# Patient Record
Sex: FEMALE | Race: ASIAN | NOT HISPANIC OR LATINO | URBAN - METROPOLITAN AREA
[De-identification: names, ages, dates, MRNs, and addresses within clinical notes are randomized per-mention and may not be internally consistent; named-entity substitution may affect disease eponyms.]

---

## 2014-02-26 RX ORDER — METOPROLOL TARTRATE 50 MG
1 TABLET ORAL
Qty: 0 | Refills: 0 | DISCHARGE
Start: 2014-02-26

## 2017-02-06 ENCOUNTER — INPATIENT (INPATIENT)
Facility: HOSPITAL | Age: 82
LOS: 1 days | Discharge: HOME | End: 2017-02-08
Attending: INTERNAL MEDICINE | Admitting: INTERNAL MEDICINE
Payer: MEDICARE

## 2017-02-06 VITALS
DIASTOLIC BLOOD PRESSURE: 58 MMHG | OXYGEN SATURATION: 100 % | TEMPERATURE: 98 F | SYSTOLIC BLOOD PRESSURE: 152 MMHG | RESPIRATION RATE: 16 BRPM | HEART RATE: 64 BPM

## 2017-02-06 DIAGNOSIS — Z95.5 PRESENCE OF CORONARY ANGIOPLASTY IMPLANT AND GRAFT: Chronic | ICD-10-CM

## 2017-02-06 DIAGNOSIS — I10 ESSENTIAL (PRIMARY) HYPERTENSION: ICD-10-CM

## 2017-02-06 DIAGNOSIS — R07.9 CHEST PAIN, UNSPECIFIED: ICD-10-CM

## 2017-02-06 DIAGNOSIS — J45.909 UNSPECIFIED ASTHMA, UNCOMPLICATED: ICD-10-CM

## 2017-02-06 DIAGNOSIS — K21.9 GASTRO-ESOPHAGEAL REFLUX DISEASE WITHOUT ESOPHAGITIS: ICD-10-CM

## 2017-02-06 DIAGNOSIS — Z98.49 CATARACT EXTRACTION STATUS, UNSPECIFIED EYE: Chronic | ICD-10-CM

## 2017-02-06 DIAGNOSIS — E03.9 HYPOTHYROIDISM, UNSPECIFIED: ICD-10-CM

## 2017-02-06 DIAGNOSIS — Z41.8 ENCOUNTER FOR OTHER PROCEDURES FOR PURPOSES OTHER THAN REMEDYING HEALTH STATE: ICD-10-CM

## 2017-02-06 DIAGNOSIS — E11.9 TYPE 2 DIABETES MELLITUS WITHOUT COMPLICATIONS: ICD-10-CM

## 2017-02-06 LAB
ALBUMIN SERPL ELPH-MCNC: 4.4 G/DL — SIGNIFICANT CHANGE UP (ref 3.3–5)
ALP SERPL-CCNC: 76 U/L — SIGNIFICANT CHANGE UP (ref 40–120)
ALT FLD-CCNC: 17 U/L — SIGNIFICANT CHANGE UP (ref 4–33)
APTT BLD: 30.5 SEC — SIGNIFICANT CHANGE UP (ref 27.5–37.4)
AST SERPL-CCNC: 20 U/L — SIGNIFICANT CHANGE UP (ref 4–32)
B PERT DNA SPEC QL NAA+PROBE: SIGNIFICANT CHANGE UP
BASE EXCESS BLDV CALC-SCNC: 5.3 MMOL/L — SIGNIFICANT CHANGE UP
BASOPHILS # BLD AUTO: 0.03 K/UL — SIGNIFICANT CHANGE UP (ref 0–0.2)
BASOPHILS NFR BLD AUTO: 0.3 % — SIGNIFICANT CHANGE UP (ref 0–2)
BILIRUB SERPL-MCNC: 0.4 MG/DL — SIGNIFICANT CHANGE UP (ref 0.2–1.2)
BLOOD GAS VENOUS - CREATININE: 0.88 MG/DL — SIGNIFICANT CHANGE UP (ref 0.5–1.3)
BUN SERPL-MCNC: 26 MG/DL — HIGH (ref 7–23)
C PNEUM DNA SPEC QL NAA+PROBE: NOT DETECTED — SIGNIFICANT CHANGE UP
CALCIUM SERPL-MCNC: 9.5 MG/DL — SIGNIFICANT CHANGE UP (ref 8.4–10.5)
CHLORIDE BLDV-SCNC: 101 MMOL/L — SIGNIFICANT CHANGE UP (ref 96–108)
CHLORIDE SERPL-SCNC: 99 MMOL/L — SIGNIFICANT CHANGE UP (ref 98–107)
CO2 SERPL-SCNC: 29 MMOL/L — SIGNIFICANT CHANGE UP (ref 22–31)
CREAT SERPL-MCNC: 0.9 MG/DL — SIGNIFICANT CHANGE UP (ref 0.5–1.3)
EOSINOPHIL # BLD AUTO: 0.46 K/UL — SIGNIFICANT CHANGE UP (ref 0–0.5)
EOSINOPHIL NFR BLD AUTO: 4.4 % — SIGNIFICANT CHANGE UP (ref 0–6)
FLUAV H1 2009 PAND RNA SPEC QL NAA+PROBE: NOT DETECTED — SIGNIFICANT CHANGE UP
FLUAV H1 RNA SPEC QL NAA+PROBE: NOT DETECTED — SIGNIFICANT CHANGE UP
FLUAV H3 RNA SPEC QL NAA+PROBE: NOT DETECTED — SIGNIFICANT CHANGE UP
FLUAV SUBTYP SPEC NAA+PROBE: SIGNIFICANT CHANGE UP
FLUBV RNA SPEC QL NAA+PROBE: NOT DETECTED — SIGNIFICANT CHANGE UP
GAS PNL BLDV: 138 MMOL/L — SIGNIFICANT CHANGE UP (ref 136–146)
GLUCOSE BLDV-MCNC: 90 — SIGNIFICANT CHANGE UP (ref 70–99)
GLUCOSE SERPL-MCNC: 98 MG/DL — SIGNIFICANT CHANGE UP (ref 70–99)
HADV DNA SPEC QL NAA+PROBE: NOT DETECTED — SIGNIFICANT CHANGE UP
HCO3 BLDV-SCNC: 25 MMOL/L — SIGNIFICANT CHANGE UP (ref 20–27)
HCOV 229E RNA SPEC QL NAA+PROBE: NOT DETECTED — SIGNIFICANT CHANGE UP
HCOV HKU1 RNA SPEC QL NAA+PROBE: NOT DETECTED — SIGNIFICANT CHANGE UP
HCOV NL63 RNA SPEC QL NAA+PROBE: NOT DETECTED — SIGNIFICANT CHANGE UP
HCOV OC43 RNA SPEC QL NAA+PROBE: NOT DETECTED — SIGNIFICANT CHANGE UP
HCT VFR BLD CALC: 40.8 % — SIGNIFICANT CHANGE UP (ref 34.5–45)
HCT VFR BLDV CALC: 40.6 % — SIGNIFICANT CHANGE UP (ref 34.5–45)
HGB BLD-MCNC: 12.9 G/DL — SIGNIFICANT CHANGE UP (ref 11.5–15.5)
HGB BLDV-MCNC: 13.2 G/DL — SIGNIFICANT CHANGE UP (ref 11.5–15.5)
HMPV RNA SPEC QL NAA+PROBE: NOT DETECTED — SIGNIFICANT CHANGE UP
HPIV1 RNA SPEC QL NAA+PROBE: NOT DETECTED — SIGNIFICANT CHANGE UP
HPIV2 RNA SPEC QL NAA+PROBE: NOT DETECTED — SIGNIFICANT CHANGE UP
HPIV3 RNA SPEC QL NAA+PROBE: NOT DETECTED — SIGNIFICANT CHANGE UP
HPIV4 RNA SPEC QL NAA+PROBE: NOT DETECTED — SIGNIFICANT CHANGE UP
IMM GRANULOCYTES NFR BLD AUTO: 0.2 % — SIGNIFICANT CHANGE UP (ref 0–1.5)
INR BLD: 1.02 — SIGNIFICANT CHANGE UP (ref 0.87–1.18)
LACTATE BLDV-MCNC: 1 MMOL/L — SIGNIFICANT CHANGE UP (ref 0.5–2)
LIDOCAIN IGE QN: 35.1 U/L — SIGNIFICANT CHANGE UP (ref 7–60)
LYMPHOCYTES # BLD AUTO: 2.63 K/UL — SIGNIFICANT CHANGE UP (ref 1–3.3)
LYMPHOCYTES # BLD AUTO: 25.4 % — SIGNIFICANT CHANGE UP (ref 13–44)
M PNEUMO DNA SPEC QL NAA+PROBE: NOT DETECTED — SIGNIFICANT CHANGE UP
MCHC RBC-ENTMCNC: 27.3 PG — SIGNIFICANT CHANGE UP (ref 27–34)
MCHC RBC-ENTMCNC: 31.6 % — LOW (ref 32–36)
MCV RBC AUTO: 86.3 FL — SIGNIFICANT CHANGE UP (ref 80–100)
MONOCYTES # BLD AUTO: 0.91 K/UL — HIGH (ref 0–0.9)
MONOCYTES NFR BLD AUTO: 8.8 % — SIGNIFICANT CHANGE UP (ref 2–14)
NEUTROPHILS # BLD AUTO: 6.32 K/UL — SIGNIFICANT CHANGE UP (ref 1.8–7.4)
NEUTROPHILS NFR BLD AUTO: 60.9 % — SIGNIFICANT CHANGE UP (ref 43–77)
NT-PROBNP SERPL-SCNC: 92.73 PG/ML — SIGNIFICANT CHANGE UP
PCO2 BLDV: 74 MMHG — HIGH (ref 41–51)
PH BLDV: 7.26 PH — LOW (ref 7.32–7.43)
PLATELET # BLD AUTO: 242 K/UL — SIGNIFICANT CHANGE UP (ref 150–400)
PMV BLD: 11.3 FL — SIGNIFICANT CHANGE UP (ref 7–13)
PO2 BLDV: < 24 MMHG — LOW (ref 35–40)
POTASSIUM BLDV-SCNC: 4.6 MMOL/L — HIGH (ref 3.4–4.5)
POTASSIUM SERPL-MCNC: 5.4 MMOL/L — HIGH (ref 3.5–5.3)
POTASSIUM SERPL-SCNC: 5.4 MMOL/L — HIGH (ref 3.5–5.3)
PROT SERPL-MCNC: 7.6 G/DL — SIGNIFICANT CHANGE UP (ref 6–8.3)
PROTHROM AB SERPL-ACNC: 11.6 SEC — SIGNIFICANT CHANGE UP (ref 10–13.1)
RBC # BLD: 4.73 M/UL — SIGNIFICANT CHANGE UP (ref 3.8–5.2)
RBC # FLD: 15.4 % — HIGH (ref 10.3–14.5)
RSV RNA SPEC QL NAA+PROBE: NOT DETECTED — SIGNIFICANT CHANGE UP
RV+EV RNA SPEC QL NAA+PROBE: NOT DETECTED — SIGNIFICANT CHANGE UP
SAO2 % BLDV: 24.8 % — LOW (ref 60–85)
SODIUM SERPL-SCNC: 141 MMOL/L — SIGNIFICANT CHANGE UP (ref 135–145)
TROPONIN T SERPL-MCNC: < 0.06 NG/ML — SIGNIFICANT CHANGE UP (ref 0–0.06)
TSH SERPL-MCNC: 0.43 UIU/ML — SIGNIFICANT CHANGE UP (ref 0.27–4.2)
WBC # BLD: 10.37 K/UL — SIGNIFICANT CHANGE UP (ref 3.8–10.5)
WBC # FLD AUTO: 10.37 K/UL — SIGNIFICANT CHANGE UP (ref 3.8–10.5)

## 2017-02-06 PROCEDURE — 71010: CPT | Mod: 26

## 2017-02-06 RX ORDER — INSULIN LISPRO 100/ML
VIAL (ML) SUBCUTANEOUS
Qty: 0 | Refills: 0 | Status: DISCONTINUED | OUTPATIENT
Start: 2017-02-06 | End: 2017-02-08

## 2017-02-06 RX ORDER — DEXTROSE 50 % IN WATER 50 %
25 SYRINGE (ML) INTRAVENOUS ONCE
Qty: 0 | Refills: 0 | Status: DISCONTINUED | OUTPATIENT
Start: 2017-02-06 | End: 2017-02-08

## 2017-02-06 RX ORDER — INFLUENZA VIRUS VACCINE 15; 15; 15; 15 UG/.5ML; UG/.5ML; UG/.5ML; UG/.5ML
0.5 SUSPENSION INTRAMUSCULAR ONCE
Qty: 0 | Refills: 0 | Status: COMPLETED | OUTPATIENT
Start: 2017-02-06 | End: 2017-02-08

## 2017-02-06 RX ORDER — SODIUM CHLORIDE 9 MG/ML
500 INJECTION INTRAMUSCULAR; INTRAVENOUS; SUBCUTANEOUS ONCE
Qty: 0 | Refills: 0 | Status: COMPLETED | OUTPATIENT
Start: 2017-02-06 | End: 2017-02-06

## 2017-02-06 RX ORDER — SODIUM CHLORIDE 9 MG/ML
1000 INJECTION, SOLUTION INTRAVENOUS
Qty: 0 | Refills: 0 | Status: DISCONTINUED | OUTPATIENT
Start: 2017-02-06 | End: 2017-02-08

## 2017-02-06 RX ORDER — DEXTROSE 50 % IN WATER 50 %
12.5 SYRINGE (ML) INTRAVENOUS ONCE
Qty: 0 | Refills: 0 | Status: DISCONTINUED | OUTPATIENT
Start: 2017-02-06 | End: 2017-02-08

## 2017-02-06 RX ORDER — ALBUTEROL 90 UG/1
1 AEROSOL, METERED ORAL
Qty: 0 | Refills: 0 | COMMUNITY

## 2017-02-06 RX ORDER — PANTOPRAZOLE SODIUM 20 MG/1
40 TABLET, DELAYED RELEASE ORAL
Qty: 0 | Refills: 0 | Status: DISCONTINUED | OUTPATIENT
Start: 2017-02-06 | End: 2017-02-08

## 2017-02-06 RX ORDER — METOPROLOL TARTRATE 50 MG
50 TABLET ORAL
Qty: 0 | Refills: 0 | Status: DISCONTINUED | OUTPATIENT
Start: 2017-02-06 | End: 2017-02-08

## 2017-02-06 RX ORDER — TICAGRELOR 90 MG/1
60 TABLET ORAL
Qty: 0 | Refills: 0 | Status: DISCONTINUED | OUTPATIENT
Start: 2017-02-06 | End: 2017-02-08

## 2017-02-06 RX ORDER — MORPHINE SULFATE 50 MG/1
1 CAPSULE, EXTENDED RELEASE ORAL ONCE
Qty: 0 | Refills: 0 | Status: DISCONTINUED | OUTPATIENT
Start: 2017-02-06 | End: 2017-02-06

## 2017-02-06 RX ORDER — SODIUM POLYSTYRENE SULFONATE 4.1 MEQ/G
30 POWDER, FOR SUSPENSION ORAL ONCE
Qty: 0 | Refills: 0 | Status: COMPLETED | OUTPATIENT
Start: 2017-02-06 | End: 2017-02-06

## 2017-02-06 RX ORDER — DEXTROSE 50 % IN WATER 50 %
1 SYRINGE (ML) INTRAVENOUS ONCE
Qty: 0 | Refills: 0 | Status: DISCONTINUED | OUTPATIENT
Start: 2017-02-06 | End: 2017-02-08

## 2017-02-06 RX ORDER — CALCIUM GLUCONATE 100 MG/ML
1 VIAL (ML) INTRAVENOUS ONCE
Qty: 0 | Refills: 0 | Status: COMPLETED | OUTPATIENT
Start: 2017-02-06 | End: 2017-02-06

## 2017-02-06 RX ORDER — LEVOTHYROXINE SODIUM 125 MCG
100 TABLET ORAL DAILY
Qty: 0 | Refills: 0 | Status: DISCONTINUED | OUTPATIENT
Start: 2017-02-06 | End: 2017-02-08

## 2017-02-06 RX ORDER — HEPARIN SODIUM 5000 [USP'U]/ML
5000 INJECTION INTRAVENOUS; SUBCUTANEOUS EVERY 12 HOURS
Qty: 0 | Refills: 0 | Status: DISCONTINUED | OUTPATIENT
Start: 2017-02-06 | End: 2017-02-08

## 2017-02-06 RX ORDER — INSULIN LISPRO 100/ML
VIAL (ML) SUBCUTANEOUS AT BEDTIME
Qty: 0 | Refills: 0 | Status: DISCONTINUED | OUTPATIENT
Start: 2017-02-06 | End: 2017-02-08

## 2017-02-06 RX ORDER — GLUCAGON INJECTION, SOLUTION 0.5 MG/.1ML
1 INJECTION, SOLUTION SUBCUTANEOUS ONCE
Qty: 0 | Refills: 0 | Status: DISCONTINUED | OUTPATIENT
Start: 2017-02-06 | End: 2017-02-08

## 2017-02-06 RX ORDER — ASPIRIN/CALCIUM CARB/MAGNESIUM 324 MG
81 TABLET ORAL DAILY
Qty: 0 | Refills: 0 | Status: DISCONTINUED | OUTPATIENT
Start: 2017-02-06 | End: 2017-02-08

## 2017-02-06 RX ORDER — ATORVASTATIN CALCIUM 80 MG/1
40 TABLET, FILM COATED ORAL AT BEDTIME
Qty: 0 | Refills: 0 | Status: DISCONTINUED | OUTPATIENT
Start: 2017-02-06 | End: 2017-02-08

## 2017-02-06 RX ADMIN — MORPHINE SULFATE 1 MILLIGRAM(S): 50 CAPSULE, EXTENDED RELEASE ORAL at 17:57

## 2017-02-06 RX ADMIN — SODIUM CHLORIDE 500 MILLILITER(S): 9 INJECTION INTRAMUSCULAR; INTRAVENOUS; SUBCUTANEOUS at 17:57

## 2017-02-06 RX ADMIN — ATORVASTATIN CALCIUM 40 MILLIGRAM(S): 80 TABLET, FILM COATED ORAL at 23:01

## 2017-02-06 RX ADMIN — Medication 200 GRAM(S): at 20:47

## 2017-02-06 RX ADMIN — SODIUM POLYSTYRENE SULFONATE 30 GRAM(S): 4.1 POWDER, FOR SUSPENSION ORAL at 20:47

## 2017-02-06 NOTE — ED ADULT NURSE NOTE - PMH
Asthma    CAD (coronary artery disease)    CAD (Coronary Atherosclerotic Disease)  2 yrs ago  Coronary Stent  Viktor López 2010  DM (diabetes mellitus)    Esophageal tear  s/p clipping  GERD (Gastroesophageal Reflux Disease)    High Cholesterol    HTN (hypertension)    HTN (Hypertension)    Hypothyroidism    MI (myocardial infarction)    Systolic heart failure    Ventricular fibrillation  on Life Vest

## 2017-02-06 NOTE — ED PROVIDER NOTE - OBJECTIVE STATEMENT
83 yo F/ w/ HTN, DM, GERD, Asthma, CAD s/p stent x 3 c/o palpitations, chest pain, nausea but no vomiting, on and off for more than 1 week. Chest pain is substernal, comes and goes, substernal, lasts 2-3mns, feel like a pressure, exertional associated with SOB on exertion. Pt also reports intermittent leg swelling. Pt denies chest pain at the moment. Denies fever, chills, URI symptoms, or any other symptoms. 83 yo F/ w/ HTN, DM, GERD, Asthma, CAD s/p stent x 3, cardiac arrest c/o palpitations, chest pain, nausea but no vomiting, on and off for 3 weeks. Chest pain is substernal, comes and goes,  lasts 2-3mns, feel like a pressure, exertional associated with SOB on exertion. has not seen a doctor aout this yet, was avoiding coming to the hospital. Pt also reports intermittent leg swelling. Pt denies chest pain at the moment. Denies fever, chills, URI symptoms, or any other symptoms. having nromal BM's and is passing gas.     Cards = Dr. Manpreet Dotson  PMD Dr. WU Ziegler

## 2017-02-06 NOTE — ED PROVIDER NOTE - ATTENDING CONTRIBUTION TO CARE
Attending Attestation: Dr. Duggan  I have personally performed a history and physical examination of the patient and discussed management with the resident as well as the patient.  I reviewed the resident's note and agree with the documented findings and plan of care.  I have authored and modified critical sections of the Provider Note, including but not limited to HPI, Physical Exam and MDM. 83 yo F c complex PMhx p/w intermittent chest pain x 3 weeks concfrning for UA.  Abd soft and minimally tender, normal Bnm and no GI sx.  Would not image at present, but perform serial abd exams, labs, admit to TEle.

## 2017-02-06 NOTE — ED PROVIDER NOTE - MEDICAL DECISION MAKING DETAILS
83 yo F/ w/ HTN, DM, GERD, Asthma, CAD s/p stent x 3 c/o palpitations, chest pain, SOB, nausea but no vomiting, on and off for more than 1 week.  - Labs, pro-BNP 83 yo F c complex PMhx p/w intermittent chest pain x 3 weeks concfrning for UA.  Abd soft and minimally tender, normal Bnm and no GI sx.  Would not image at present, but perform serial abd exams, labs, admit to TEle.

## 2017-02-06 NOTE — ED ADULT NURSE NOTE - ED STAT RN HANDOFF DETAILS
Rpt to 7N RN - pt resting comfortably, HR NSR on CM - just finished receiving KayExelate/CalGlu now for elevated K+, Needs repeat CE/K+ drawn in 30mins.  Pt VSS, EKG in Chart, Urdo speaking, Pt family called and alerted to bed assign , in NAD - stable for transport.

## 2017-02-06 NOTE — H&P ADULT. - ASSESSMENT
81 y/o Slovenian speaking F with PMH of CAD(s/p 2 stents), GERD, DM, Asthma, Hypothyroidism presented with the complaint of palpitations and left sided chest pain. R/o ACS    +Chest pain-Dr. Manpreet Dotson following

## 2017-02-06 NOTE — H&P ADULT. - RS GEN PE MLT RESP DETAILS PC
normal/rhonchi/diminished breath sounds, L/airway patent/no intercostal retractions/respirations non-labored/no chest wall tenderness/diminished breath sounds, R/no rales/no wheezes

## 2017-02-06 NOTE — H&P ADULT. - NEGATIVE MUSCULOSKELETAL SYMPTOMS
no joint swelling/no neck pain/no myalgia/no muscle weakness/no stiffness/no arthritis/no muscle cramps/no arthralgia

## 2017-02-06 NOTE — H&P ADULT. - NEGATIVE OPHTHALMOLOGIC SYMPTOMS
no diplopia/no lacrimation L/no lacrimation R/no photophobia/no blurred vision L/no discharge L/no blurred vision R

## 2017-02-06 NOTE — H&P ADULT. - PROBLEM SELECTOR PLAN 1
Will admit to telemetry, serial EKG, serial Fran prn chest pain/SOB  f/u MD note   HgBA1C, TSH, lipid profile, CBC, CMP in am   TTE ordered to evaluate LVEF  Nuclear Pharmacologic stress test ordered   Dr. Manpreet Dotson following for cardiology and recommended "Echo and NST(Pharm), Cont with home meds, Check TSH"  Continue with Aspirin 81mg and Brilinta 60mg BID

## 2017-02-06 NOTE — H&P ADULT. - NEGATIVE NEUROLOGICAL SYMPTOMS
no syncope/no focal seizures/no tremors/no loss of consciousness/no vertigo/no confusion/no loss of sensation/no generalized seizures/no transient paralysis/no hemiparesis/no paresthesias/no difficulty walking/no headache/no weakness

## 2017-02-06 NOTE — H&P ADULT. - NEGATIVE ENMT SYMPTOMS
no vertigo/no nasal congestion/no tinnitus/no sinus symptoms/no ear pain/no nasal discharge/no hearing difficulty

## 2017-02-06 NOTE — H&P ADULT. - RADIOLOGY RESULTS AND INTERPRETATION
CXR: Redemonstrated lobular masslike soft tissue shadow protruding from left hemidiaphragm corresponding to focal diaphragmatic herniation of fat as demonstrated on chest CT from 2/9/2015. Clear remaining visualized lungs. No pleural effusions or pneumothorax. Stable cardiac and mediastinal silhouettes including a left coronary stent. Trachea midline. Generalized osteopenia again noted.

## 2017-02-06 NOTE — ED ADULT NURSE NOTE - OBJECTIVE STATEMENT
pt to spot # 21 leeann speaking with grandughter at bedside for translation, pt with complaints of SOB, chest pain and nausea, on and off x 1 week, and palpitations with left flank pain x 1 hour PMH anxiety, cardiac arrest, cardiac stent x 3, HTN, labs drawn and sent, vitals as noted, MD collins at bedside, will monitor and follow up

## 2017-02-06 NOTE — ED ADULT NURSE NOTE - PSH
History of Cholecystectomy    S/P cataract extraction    Stented coronary artery    Stented coronary artery  x3, last one stent put in on 2/30/2014

## 2017-02-06 NOTE — ED PROVIDER NOTE - PROGRESS NOTE DETAILS
Spoke with dr. LAURY Dotson who asked to admit to Dr. Ziegler and stated he would inform Dr. Ziegler, states we do not need ot call Toney.

## 2017-02-06 NOTE — ED ADULT TRIAGE NOTE - CHIEF COMPLAINT QUOTE
c/o SOB, chest pain and nausea, on and off x 1 week, and palpitations with left flank pain x 1 hour PMH anxiety, cardiac arrest, cardiac stent x 3, HTN,

## 2017-02-07 ENCOUNTER — TRANSCRIPTION ENCOUNTER (OUTPATIENT)
Age: 82
End: 2017-02-07

## 2017-02-07 LAB
ALBUMIN SERPL ELPH-MCNC: 3.8 G/DL — SIGNIFICANT CHANGE UP (ref 3.3–5)
ALP SERPL-CCNC: 67 U/L — SIGNIFICANT CHANGE UP (ref 40–120)
ALT FLD-CCNC: 14 U/L — SIGNIFICANT CHANGE UP (ref 4–33)
AST SERPL-CCNC: 16 U/L — SIGNIFICANT CHANGE UP (ref 4–32)
BILIRUB SERPL-MCNC: 0.3 MG/DL — SIGNIFICANT CHANGE UP (ref 0.2–1.2)
BUN SERPL-MCNC: 20 MG/DL — SIGNIFICANT CHANGE UP (ref 7–23)
CALCIUM SERPL-MCNC: 8.8 MG/DL — SIGNIFICANT CHANGE UP (ref 8.4–10.5)
CHLORIDE SERPL-SCNC: 104 MMOL/L — SIGNIFICANT CHANGE UP (ref 98–107)
CHOLEST SERPL-MCNC: 103 MG/DL — LOW (ref 120–199)
CO2 SERPL-SCNC: 26 MMOL/L — SIGNIFICANT CHANGE UP (ref 22–31)
CREAT SERPL-MCNC: 0.76 MG/DL — SIGNIFICANT CHANGE UP (ref 0.5–1.3)
GLUCOSE SERPL-MCNC: 95 MG/DL — SIGNIFICANT CHANGE UP (ref 70–99)
HBA1C BLD-MCNC: 6 % — HIGH (ref 4–5.6)
HCT VFR BLD CALC: 36.8 % — SIGNIFICANT CHANGE UP (ref 34.5–45)
HDLC SERPL-MCNC: 45 MG/DL — SIGNIFICANT CHANGE UP (ref 45–65)
HGB BLD-MCNC: 11.8 G/DL — SIGNIFICANT CHANGE UP (ref 11.5–15.5)
LIPID PNL WITH DIRECT LDL SERPL: 57 MG/DL — SIGNIFICANT CHANGE UP
MAGNESIUM SERPL-MCNC: 2 MG/DL — SIGNIFICANT CHANGE UP (ref 1.6–2.6)
MCHC RBC-ENTMCNC: 27.6 PG — SIGNIFICANT CHANGE UP (ref 27–34)
MCHC RBC-ENTMCNC: 32.1 % — SIGNIFICANT CHANGE UP (ref 32–36)
MCV RBC AUTO: 86 FL — SIGNIFICANT CHANGE UP (ref 80–100)
PHOSPHATE SERPL-MCNC: 4.5 MG/DL — SIGNIFICANT CHANGE UP (ref 2.5–4.5)
PLATELET # BLD AUTO: 225 K/UL — SIGNIFICANT CHANGE UP (ref 150–400)
PMV BLD: 11.6 FL — SIGNIFICANT CHANGE UP (ref 7–13)
POTASSIUM SERPL-MCNC: 4.4 MMOL/L — SIGNIFICANT CHANGE UP (ref 3.5–5.3)
POTASSIUM SERPL-MCNC: 4.5 MMOL/L — SIGNIFICANT CHANGE UP (ref 3.5–5.3)
POTASSIUM SERPL-SCNC: 4.4 MMOL/L — SIGNIFICANT CHANGE UP (ref 3.5–5.3)
POTASSIUM SERPL-SCNC: 4.5 MMOL/L — SIGNIFICANT CHANGE UP (ref 3.5–5.3)
PROT SERPL-MCNC: 6.4 G/DL — SIGNIFICANT CHANGE UP (ref 6–8.3)
RBC # BLD: 4.28 M/UL — SIGNIFICANT CHANGE UP (ref 3.8–5.2)
RBC # FLD: 15.6 % — HIGH (ref 10.3–14.5)
SODIUM SERPL-SCNC: 143 MMOL/L — SIGNIFICANT CHANGE UP (ref 135–145)
TRIGL SERPL-MCNC: 59 MG/DL — SIGNIFICANT CHANGE UP (ref 10–149)
TROPONIN T SERPL-MCNC: < 0.06 NG/ML — SIGNIFICANT CHANGE UP (ref 0–0.06)
TSH SERPL-MCNC: 0.53 UIU/ML — SIGNIFICANT CHANGE UP (ref 0.27–4.2)
WBC # BLD: 9.83 K/UL — SIGNIFICANT CHANGE UP (ref 3.8–10.5)
WBC # FLD AUTO: 9.83 K/UL — SIGNIFICANT CHANGE UP (ref 3.8–10.5)

## 2017-02-07 PROCEDURE — 93306 TTE W/DOPPLER COMPLETE: CPT | Mod: 26

## 2017-02-07 PROCEDURE — 78452 HT MUSCLE IMAGE SPECT MULT: CPT | Mod: 26

## 2017-02-07 PROCEDURE — 93018 CV STRESS TEST I&R ONLY: CPT | Mod: GC

## 2017-02-07 PROCEDURE — 93016 CV STRESS TEST SUPVJ ONLY: CPT | Mod: GC

## 2017-02-07 RX ORDER — ALBUTEROL 90 UG/1
0 AEROSOL, METERED ORAL
Qty: 0 | Refills: 0 | COMMUNITY

## 2017-02-07 RX ORDER — ALBUTEROL 90 UG/1
1.25 AEROSOL, METERED ORAL
Qty: 0 | Refills: 0 | Status: DISCONTINUED | OUTPATIENT
Start: 2017-02-07 | End: 2017-02-07

## 2017-02-07 RX ORDER — NITROGLYCERIN 6.5 MG
0 CAPSULE, EXTENDED RELEASE ORAL
Qty: 0 | Refills: 0 | COMMUNITY

## 2017-02-07 RX ORDER — LISINOPRIL 2.5 MG/1
1 TABLET ORAL
Qty: 0 | Refills: 0 | COMMUNITY

## 2017-02-07 RX ORDER — IPRATROPIUM/ALBUTEROL SULFATE 18-103MCG
3 AEROSOL WITH ADAPTER (GRAM) INHALATION ONCE
Qty: 0 | Refills: 0 | Status: DISCONTINUED | OUTPATIENT
Start: 2017-02-07 | End: 2017-02-08

## 2017-02-07 RX ADMIN — Medication 50 MILLIGRAM(S): at 05:42

## 2017-02-07 RX ADMIN — TICAGRELOR 60 MILLIGRAM(S): 90 TABLET ORAL at 17:39

## 2017-02-07 RX ADMIN — Medication 50 MILLIGRAM(S): at 17:39

## 2017-02-07 RX ADMIN — Medication 81 MILLIGRAM(S): at 13:34

## 2017-02-07 RX ADMIN — TICAGRELOR 60 MILLIGRAM(S): 90 TABLET ORAL at 07:00

## 2017-02-07 RX ADMIN — HEPARIN SODIUM 5000 UNIT(S): 5000 INJECTION INTRAVENOUS; SUBCUTANEOUS at 05:42

## 2017-02-07 RX ADMIN — ATORVASTATIN CALCIUM 40 MILLIGRAM(S): 80 TABLET, FILM COATED ORAL at 22:04

## 2017-02-07 RX ADMIN — Medication 100 MICROGRAM(S): at 05:42

## 2017-02-07 RX ADMIN — PANTOPRAZOLE SODIUM 40 MILLIGRAM(S): 20 TABLET, DELAYED RELEASE ORAL at 07:01

## 2017-02-07 RX ADMIN — HEPARIN SODIUM 5000 UNIT(S): 5000 INJECTION INTRAVENOUS; SUBCUTANEOUS at 17:39

## 2017-02-07 NOTE — DISCHARGE NOTE ADULT - NS AS DC HF EDUCATION INSTRUCTIONS
Report weight gain of 2 or more pounds in one day or 3 or more pounds in one week, worsening shortness of breath, fatigue, weakness, increased swelling of hands and feet to primary care provider/Activities as tolerated/Call Primary Care Provider for follow-up after discharge/Low salt diet/Monitor Weight Daily

## 2017-02-07 NOTE — DISCHARGE NOTE ADULT - ADDITIONAL INSTRUCTIONS
Please follow up with your primary care provider within 2 weeks call for an appointment Please follow up with your primary care provider and cardiologist within 2 weeks -call for appointments

## 2017-02-07 NOTE — DISCHARGE NOTE ADULT - SECONDARY DIAGNOSIS.
Asthma DM (diabetes mellitus) GERD (Gastroesophageal Reflux Disease) HTN (hypertension) High cholesterol

## 2017-02-07 NOTE — DISCHARGE NOTE ADULT - CARE PROVIDER_API CALL
Call your PMD for an appointment  within 2 weeks,   Phone: (   )    -  Fax: (   )    - Teresa Ziegler (ISA[facility]), Internal Medicine  [address] [place] [address]  Phone: [phone]  Fax: [phone]    Manpreet Dotson), Cardiovascular Disease; Internal Medicine  [address] [place] [address]  Phone: [phone]  Fax: [phone]

## 2017-02-07 NOTE — DISCHARGE NOTE ADULT - PROVIDER TOKENS
FREE:[LAST:[Call your PMD for an appointment  within 2 weeks],PHONE:[(   )    -],FAX:[(   )    -]] TOKEN:'[id number]',TOKEN:'[id number]'

## 2017-02-07 NOTE — DISCHARGE NOTE ADULT - PATIENT PORTAL LINK FT
“You can access the Follow[facility] Patient Portal, offered by [facility], by registering with the following website: [email or web address]”

## 2017-02-07 NOTE — DISCHARGE NOTE ADULT - HOSPITAL COURSE
83 yo Indonesian speaking female with chest pain. Patient had an EKG . Cardiac enzymes negative x 2 . CXR no pleural effussions or pneumothorax. Patient had an ECHO and stress test.    ECHO     Stress test 81 yo Frisian speaking female with chest pain. Patient had an EKG . Cardiac enzymes negative x 2 . CXR no pleural effussions or pneumothorax. Patient had an ECHO and stress test.  Echo done on 2/7 showed  Mild aortic regurgitation.  Increased relative wall thickness with normal left ventricular mass index, consistent with concentric left ventricular remodeling.  Normal left ventricular systolic function. No segmental wall motion abnormalities. Normal right ventricular size and function.    Nuclear stress test done on 2/7 showed Myocardial Perfusion SPECT results are mildly abnormal.  There is a small, mild defect in apical wall that is fixed, suggestive of infarct. No clear evidence of ischemia. Post-stress gated wall motion analysis was performed (LVEF > 70%;LVEDV = 47 ml.), revealing normal LV function.  No segmental wall motion abnormality was seen. RV function appeared normal.    Patient remained stable and is now clear for discharge home. 83 yo Yoruba speaking female with chest pain. Patient had an EKG . Cardiac enzymes negative x 2 . CXR no pleural effussions or pneumothorax. Patient had an ECHO and stress test.  Echo done on 2/7 showed  Mild aortic regurgitation.  Increased relative wall thickness with normal left ventricular mass index, consistent with concentric left ventricular remodeling.  Normal left ventricular systolic function. No segmental wall motion abnormalities. Normal right ventricular size and function.    Nuclear stress test done on 2/7 showed Myocardial Perfusion SPECT results are mildly abnormal.  There is a small, mild defect in apical wall that is fixed, suggestive of infarct. No clear evidence of ischemia. Post-stress gated wall motion analysis was performed (LVEF > 70%;LVEDV = 47 ml.), revealing normal LV function.  No segmental wall motion abnormality was seen. RV function appeared normal.    Chest pain atypical.  Believed to be 2/2 GERD/gastritis.  Patient on protonix.     Patient remained stable and is now clear for discharge home.  She is to follow up with Dr. Ziegler and Dr. Dotson as outpatient within 1-2 weeks.

## 2017-02-07 NOTE — DISCHARGE NOTE ADULT - COMMUNITY RESOURCES
Home Health Aide reinstated for start of care 2/9/2017 for resumption of services as previously scheduled. If you would like to increase hours for aide please call [phone] to file an appeal of the December evaluation that cut your hours.

## 2017-02-07 NOTE — DISCHARGE NOTE ADULT - CARE PLAN
Principal Discharge DX:	Chest pain, unspecified type  Goal:	continue current care  Instructions for follow-up, activity and diet:	Please follow up with your primary care provider within 2 weeks call for an appointment Principal Discharge DX:	Chest pain, unspecified type  Goal:	Resolution of symptoms  Instructions for follow-up, activity and diet:	Please follow up with your primary care provider within 2 weeks call for an appointment  Secondary Diagnosis:	Asthma  Instructions for follow-up, activity and diet:	Use albuterol inhaler as needed for shortness of breath or wheezing.  Follow up with your primary care provider.  Secondary Diagnosis:	DM (diabetes mellitus)  Instructions for follow-up, activity and diet:	Continue diabetes medications as prescribed.  Monitor your fingersticks.  Low sugar diet.  Follow up with your primary care provider.  Secondary Diagnosis:	GERD (Gastroesophageal Reflux Disease)  Instructions for follow-up, activity and diet:	Continue protonix as prescribed.  Follow up with your primary care provider.  Secondary Diagnosis:	HTN (hypertension)  Instructions for follow-up, activity and diet:	Continue medications as prescribed.  Low salt diet.  Follow up with your cardiologist.  Secondary Diagnosis:	High cholesterol  Instructions for follow-up, activity and diet:	Continue atorvastatin as prescribed.  Low fat diet.  Follow up with your primary care provider.

## 2017-02-07 NOTE — DISCHARGE NOTE ADULT - MEDICATION SUMMARY - MEDICATIONS TO STOP TAKING
I will STOP taking the medications listed below when I get home from the hospital:  None I will STOP taking the medications listed below when I get home from the hospital:    Nitrostat  --  under tongue , As Needed

## 2017-02-07 NOTE — DISCHARGE NOTE ADULT - MEDICATION SUMMARY - MEDICATIONS TO TAKE
I will START or STAY ON the medications listed below when I get home from the hospital:    aspirin 81 mg oral tablet  -- 1 tab(s) by mouth once a day  -- Indication: For Heart    Tradjenta 5 mg oral tablet  -- 1 tab(s) by mouth once a day  -- Indication: For Diabetes    atorvastatin 40 mg oral tablet  -- 1 tab(s) by mouth once a day  -- Indication: For Cholesterol    Brilinta (ticagrelor) 60 mg oral tablet  -- 1 tab(s) by mouth 2 times a day  -- Indication: For Heart    metoprolol tartrate 50 mg oral tablet  -- 1 tab(s) by mouth 2 times a day  -- Indication: For HTN (hypertension)    pantoprazole 40 mg oral delayed release tablet  -- 1 tab(s) by mouth once a day  -- Indication: For GERD (Gastroesophageal Reflux Disease)    levothyroxine 100 mcg (0.1 mg) oral tablet  -- 0.5 tab(s) by mouth once a day  -- Indication: For Hypothyroid I will START or STAY ON the medications listed below when I get home from the hospital:    aspirin 81 mg oral tablet  -- 1 tab(s) by mouth once a day  -- Indication: For Coronary Artery Disease    Tradjenta 5 mg oral tablet  -- 1 tab(s) by mouth once a day  -- Indication: For Diabetes    atorvastatin 40 mg oral tablet  -- 1 tab(s) by mouth once a day  -- Indication: For Cholesterol    Brilinta (ticagrelor) 60 mg oral tablet  -- 1 tab(s) by mouth 2 times a day  -- Indication: For Coronary Artery Disease    metoprolol tartrate 50 mg oral tablet  -- 1 tab(s) by mouth 2 times a day  -- Indication: For HTN (hypertension)    ProAir HFA 90 mcg/inh inhalation aerosol  -- 2 puff(s) inhaled 4 times a day, as needed for wheeze/shortness of breath  -- Indication: For Asthma (as needed for shortness of breath/wheeze)    pantoprazole 40 mg oral delayed release tablet  -- 1 tab(s) by mouth once a day  -- Indication: For GERD (Gastroesophageal Reflux Disease)    levothyroxine 100 mcg (0.1 mg) oral tablet  -- 0.5 tab(s) by mouth once a day  -- Indication: For Hypothyroidism

## 2017-02-07 NOTE — DISCHARGE NOTE ADULT - CARE PROVIDERS DIRECT ADDRESSES
,DirectAddress_Unknown,DirectAddress_Unknown ,DirectAddress_Unknown,[email or web address],DirectAddress_Unknown

## 2017-02-07 NOTE — DISCHARGE NOTE ADULT - PLAN OF CARE
continue current care Please follow up with your primary care provider within 2 weeks call for an appointment Use albuterol inhaler as needed for shortness of breath or wheezing.  Follow up with your primary care provider. Continue diabetes medications as prescribed.  Monitor your fingersticks.  Low sugar diet.  Follow up with your primary care provider. Continue protonix as prescribed.  Follow up with your primary care provider. Continue medications as prescribed.  Low salt diet.  Follow up with your cardiologist. Continue atorvastatin as prescribed.  Low fat diet.  Follow up with your primary care provider. Resolution of symptoms

## 2017-02-08 VITALS — WEIGHT: 112.66 LBS

## 2017-02-08 LAB
ALBUMIN SERPL ELPH-MCNC: 3.8 G/DL — SIGNIFICANT CHANGE UP (ref 3.3–5)
ALP SERPL-CCNC: 64 U/L — SIGNIFICANT CHANGE UP (ref 40–120)
ALT FLD-CCNC: 13 U/L — SIGNIFICANT CHANGE UP (ref 4–33)
AST SERPL-CCNC: 17 U/L — SIGNIFICANT CHANGE UP (ref 4–32)
BILIRUB SERPL-MCNC: 0.4 MG/DL — SIGNIFICANT CHANGE UP (ref 0.2–1.2)
BUN SERPL-MCNC: 20 MG/DL — SIGNIFICANT CHANGE UP (ref 7–23)
CALCIUM SERPL-MCNC: 8.9 MG/DL — SIGNIFICANT CHANGE UP (ref 8.4–10.5)
CHLORIDE SERPL-SCNC: 101 MMOL/L — SIGNIFICANT CHANGE UP (ref 98–107)
CO2 SERPL-SCNC: 25 MMOL/L — SIGNIFICANT CHANGE UP (ref 22–31)
CREAT SERPL-MCNC: 0.84 MG/DL — SIGNIFICANT CHANGE UP (ref 0.5–1.3)
GLUCOSE SERPL-MCNC: 119 MG/DL — HIGH (ref 70–99)
HCT VFR BLD CALC: 36.6 % — SIGNIFICANT CHANGE UP (ref 34.5–45)
HGB BLD-MCNC: 11.8 G/DL — SIGNIFICANT CHANGE UP (ref 11.5–15.5)
MAGNESIUM SERPL-MCNC: 1.9 MG/DL — SIGNIFICANT CHANGE UP (ref 1.6–2.6)
MCHC RBC-ENTMCNC: 27.8 PG — SIGNIFICANT CHANGE UP (ref 27–34)
MCHC RBC-ENTMCNC: 32.2 % — SIGNIFICANT CHANGE UP (ref 32–36)
MCV RBC AUTO: 86.1 FL — SIGNIFICANT CHANGE UP (ref 80–100)
PHOSPHATE SERPL-MCNC: 4.2 MG/DL — SIGNIFICANT CHANGE UP (ref 2.5–4.5)
PLATELET # BLD AUTO: 215 K/UL — SIGNIFICANT CHANGE UP (ref 150–400)
PMV BLD: 11.2 FL — SIGNIFICANT CHANGE UP (ref 7–13)
POTASSIUM SERPL-MCNC: 4.3 MMOL/L — SIGNIFICANT CHANGE UP (ref 3.5–5.3)
POTASSIUM SERPL-SCNC: 4.3 MMOL/L — SIGNIFICANT CHANGE UP (ref 3.5–5.3)
PROT SERPL-MCNC: 6.4 G/DL — SIGNIFICANT CHANGE UP (ref 6–8.3)
RBC # BLD: 4.25 M/UL — SIGNIFICANT CHANGE UP (ref 3.8–5.2)
RBC # FLD: 15.4 % — HIGH (ref 10.3–14.5)
SODIUM SERPL-SCNC: 139 MMOL/L — SIGNIFICANT CHANGE UP (ref 135–145)
WBC # BLD: 9.71 K/UL — SIGNIFICANT CHANGE UP (ref 3.8–10.5)
WBC # FLD AUTO: 9.71 K/UL — SIGNIFICANT CHANGE UP (ref 3.8–10.5)

## 2017-02-08 RX ORDER — ALBUTEROL 90 UG/1
2 AEROSOL, METERED ORAL
Qty: 0 | Refills: 0 | DISCHARGE
Start: 2017-02-08

## 2017-02-08 RX ADMIN — Medication 100 MICROGRAM(S): at 05:32

## 2017-02-08 RX ADMIN — Medication 50 MILLIGRAM(S): at 05:32

## 2017-02-08 RX ADMIN — TICAGRELOR 60 MILLIGRAM(S): 90 TABLET ORAL at 05:32

## 2017-02-08 RX ADMIN — Medication 81 MILLIGRAM(S): at 11:30

## 2017-02-08 RX ADMIN — INFLUENZA VIRUS VACCINE 0.5 MILLILITER(S): 15; 15; 15; 15 SUSPENSION INTRAMUSCULAR at 11:28

## 2017-02-08 RX ADMIN — HEPARIN SODIUM 5000 UNIT(S): 5000 INJECTION INTRAVENOUS; SUBCUTANEOUS at 05:32

## 2017-02-08 RX ADMIN — PANTOPRAZOLE SODIUM 40 MILLIGRAM(S): 20 TABLET, DELAYED RELEASE ORAL at 05:37

## 2018-03-14 PROBLEM — Z00.00 ENCOUNTER FOR PREVENTIVE HEALTH EXAMINATION: Noted: 2018-03-14

## 2018-03-17 ENCOUNTER — APPOINTMENT (OUTPATIENT)
Dept: CT IMAGING | Facility: IMAGING CENTER | Age: 83
End: 2018-03-17

## 2019-03-04 NOTE — H&P ADULT. - CARDIOVASCULAR
Meli Crain  1952  66 y.o. female     Patient presents to clinic today with complaint of Swollen ankles and chronic foot pain.    03/04/2019  Chief Complaint   Patient presents with   • Left Ankle - Pain   • Right Ankle - Pain   • Left Foot - Pain   • Right Foot - Pain           History of Present Illness    Meli Crain is a 66 y.o. female who presents for evaluation of chronic bilateral foot and lower extremity pain.  She describes the pain as achy and at times sharp and stabbing.  She states there is also a feeling of stiffness with start up activity.  She states this has been chronic and worsening over the past several years.  She feels her stability is okay however if she is quite active her feet and legs swell significantly at night and increase her pain.  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  She feels her swelling in her feet and legs has worsened.  She was advised approximately a year ago to start wearing compression hose but she feels she never had properly fitting compression hose and later developed a cellulitis.  She has not been wearing compression hose over the past 8 months.  She denies any other prior treatments.  She denies any acute trauma or injuries.      Past Medical History:   Diagnosis Date   • Acquired hypothyroidism    • Acute sinusitis    • Anemia, unspecified    • Atrial fibrillation (CMS/HCC)     Atrial fibrillation - on ASA therapy   • Blood in urine    • Callus    • Cardiac pacemaker     Patient with cardiac pacemaker   • Cellulitis     Cellulitis and abscess of upper arm   • Class 2 obesity due to excess calories with serious comorbidity and body mass index (BMI) of 39.0 to 39.9 in adult 5/2/2018   • Cough    • Dysuria    • Edema of lower extremity    • Encounter for long-term (current) use of medications     Long-term  (current) use of other medications   • Essential hypertension    • Exanthematous disorder    • Flexion contracture of right elbow 8/20/2018   • Gastroesophageal reflux disease    • History of bone density study 05/28/2014    DEXA BONE DENSITY 04095 ([facility]) (1) - LIU WILLIAMSON ([facility])   • History of bone density study 03/17/2008    DEXA BONE DENSITY APPENDICULAR 78947 (1)   • History of mammogram 04/24/2013    MAMMOGRAM DIAGNOSTIC BILATERAL 15417 ([facility]) (3) - NATANAEL NUNEZ ([facility])   • History of mammogram 05/29/2014    SCREENING MAMMOGRAM 52708 ([facility]) (1) - LIU WILLIAMSON ([facility])   • Hyperlipidemia    • Hypertensive disorder    • Incisional hernia    • Mixed hyperlipidemia 5/2/2018   • Nausea    • Need for immunization against influenza     Needs influenza immunization   • Neuropathy    • Pain in throat    • Paroxysmal supraventricular tachycardia (CMS/HCC)    • Skin lesion    • Talipes equinovarus    • Upper respiratory infection    • Urinary tract infectious disease     Urinary tract infectious disease - recurrent   • Venous insufficiency (chronic) (peripheral) 5/2/2018   • Ventricular arrhythmia    • Vitamin B12 deficiency (dietary) anemia 5/10/2018   • Vitamin D deficiency 5/10/2018         Past Surgical History:   Procedure Laterality Date   • ARM LESION/CYST EXCISION Left 09/22/2008    Remove arm/elbow lesion (1) - Excision of mass in left forearm   • CARDIAC PACEMAKER PLACEMENT      Cardiac pacemaker, insertion (1)   • CHOLECYSTECTOMY  1987    Cholecystectomy (1) - Open   • FOOT SURGERY Bilateral 1995    Foot/toes surgery procedure (1) - Bilateral feet restructure (clubfoot)   • INJECTION OF MEDICATION  04/20/2015    Rocephin (1) - LIU WILLIAMSON ([facility])   • OOPHORECTOMY     • TONSILLECTOMY      Tonsillectomy (1)   • TOTAL ABDOMINAL HYSTERECTOMY      Total abd hysterectomy (1) - With BSO         Family History   Problem Relation Age of Onset   • Cancer Other    • Coronary artery disease Other    • Diabetes Other    • Hypertension  Other    • Lung disease Other    • Breast cancer Sister    • Heart disease Sister    • Diabetes Sister    • Hypertension Sister    • Thyroid disease Sister    • Cancer Mother    • Heart disease Mother    • Hypertension Mother    • Heart disease Father    • Hypertension Father    • Cancer Brother    • Heart disease Brother    • Diabetes Brother    • Hypertension Brother          Social History     Socioeconomic History   • Marital status:      Spouse name: Not on file   • Number of children: Not on file   • Years of education: Not on file   • Highest education level: Not on file   Social Needs   • Financial resource strain: Not on file   • Food insecurity - worry: Not on file   • Food insecurity - inability: Not on file   • Transportation needs - medical: Not on file   • Transportation needs - non-medical: Not on file   Occupational History   • Not on file   Tobacco Use   • Smoking status: Never Smoker   • Smokeless tobacco: Never Used   Substance and Sexual Activity   • Alcohol use: No   • Drug use: No   • Sexual activity: Yes     Partners: Male     Comment:    Other Topics Concern   • Not on file   Social History Narrative   • Not on file         Current Outpatient Medications   Medication Sig Dispense Refill   • apixaban (ELIQUIS) 5 MG tablet tablet Take 5 mg by mouth 2 (Two) Times a Day.     • atorvastatin (LIPITOR) 20 MG tablet Take 1 tablet by mouth Daily. 90 tablet 1   • conjugated estrogens (PREMARIN) 0.625 MG/GM vaginal cream Insert  into the vagina Daily. As directed 30 g 5   • diltiazem CD (DILTIAZEM CD) 240 MG 24 hr capsule Take 240 mg by mouth Daily.     • esomeprazole (nexIUM) 40 MG capsule Take 1 capsule by mouth Daily. Med Name: esomeprazole magnesium 40 mg capsule,delayed release 90 capsule 3   • fluticasone (FLONASE) 50 MCG/ACT nasal spray 2 sprays into each nostril Daily. 3 bottle 3   • levothyroxine (SYNTHROID, LEVOTHROID) 50 MCG tablet Take 1 tablet by mouth Daily. 90 tablet 1   •  "metoprolol tartrate (LOPRESSOR) 50 MG tablet Take 50 mg by mouth 2 (Two) Times a Day.     • pregabalin (LYRICA) 75 MG capsule Take 1 capsule by mouth 2 (Two) Times a Day. 60 capsule 5   • sotalol (BETAPACE) 120 MG tablet Take 120 mg by mouth 2 (Two) Times a Day.     • vitamin B-12 (CYANOCOBALAMIN) 500 MCG tablet Take 1 tablet by mouth Daily.     • vitamin D (ERGOCALCIFEROL) 44884 units capsule capsule Take 1 capsule by mouth 1 (One) Time Per Week. 13 capsule 3   • promethazine (PHENERGAN) 25 MG tablet Take 1 tablet by mouth Every 6 (Six) Hours As Needed for Nausea or Vomiting. 20 tablet 0     No current facility-administered medications for this visit.          OBJECTIVE    Ht 167.6 cm (66\")   Wt 104 kg (230 lb)   BMI 37.12 kg/m²       Review of Systems   Constitutional: Negative.    HENT: Negative.    Eyes: Negative.    Respiratory: Negative.    Cardiovascular:        Atrial fib and flutter   Gastrointestinal: Negative.    Endocrine: Negative.    Genitourinary: Positive for frequency.   Musculoskeletal: Positive for arthralgias and joint swelling.        Foot pain, Ankle pain   Skin: Negative.    Allergic/Immunologic: Negative.    Neurological: Negative.    Hematological: Negative.    Psychiatric/Behavioral: Negative.          Physical Exam   Constitutional: she appears well-developed and well-nourished.   HEENT: Normocephalic. Atraumatic.  CV: No CP. RRR  Resp: Non-labored respirations.  Psychiatric: she has a normal mood and affect. her behavior is normal.         Lower Extremity Exam:  Vascular: DP/PT pulses palpable 1+.   2+ b/l LE Edema  Foot warm  Neuro: Protective sensation intact, b/l.  DTRs intact  Negative Tinel over tarsal tunnel  Integument: No open wounds or lesions.  No erythema, scaling  No masses  Musculoskeletal: LE muscle strength 4/5.   Can perform double heel rise  Gait normal   Ankle ROM decreased without pain or crepitus  STJ ROM decreased without pain or crepitus  Pes cavus noted " bilateral  Mild tenderness palpation of dorsal midfoot bilateral              ASSESSMENT AND PLAN    Meli was seen today for pain, pain, pain and pain.    Diagnoses and all orders for this visit:    Clubfoot, congenital    Pain  -     XR foot 3+ vw bilateral  -     XR ankle 3+ vw bilateral    Pain in both feet    Bilateral leg edema    Venous insufficiency      -Comprehensive foot and ankle exam performed  -Radiographs ordered and reviewed  -Educated pt on diagnosis, etiology and treatment of residual clubfoot deformity with arthritic changes, chronic lower extremity edema and venous insufficiency.  -Continue motion control shoe  -At this point patient's pain seems to be stemming more from chronic, worsening lower extremity edema.  Discussed treatment options including compression stockings and possibly lymphedema pumps for improved management.  Prior to initiating this I did recommend referral to a pain center for further evaluation.  I would like to see how much the patient's pain improves with improvement of her chronic swelling.  If still significantly painful may consider custom bracing for mild residual clubfoot/arthritic cavovarus deformity  -Recheck 4 weeks            This document has been electronically signed by Rashi Griffin DPM on March 4, 2019 12:37 PM     EMR Dragon/Transcription disclaimer:   Much of this encounter note is an electronic transcription/translation of spoken language to printed text. The electronic translation of spoken language may permit erroneous, or at times, nonsensical words or phrases to be inadvertently transcribed; Although I have reviewed the note for such errors, some may still exist.    Rashi Griffin DPM  3/4/2019  12:37 PM             details… detailed exam

## 2019-09-06 ENCOUNTER — INPATIENT (INPATIENT)
Facility: HOSPITAL | Age: 84
LOS: 6 days | Discharge: HOME HEALTH | End: 2019-09-13
Attending: INTERNAL MEDICINE | Admitting: INTERNAL MEDICINE
Payer: MEDICARE

## 2019-09-06 VITALS
TEMPERATURE: 98 F | HEART RATE: 56 BPM | SYSTOLIC BLOOD PRESSURE: 134 MMHG | RESPIRATION RATE: 16 BRPM | OXYGEN SATURATION: 100 % | DIASTOLIC BLOOD PRESSURE: 58 MMHG

## 2019-09-06 DIAGNOSIS — J18.9 PNEUMONIA, UNSPECIFIED ORGANISM: ICD-10-CM

## 2019-09-06 DIAGNOSIS — Z95.5 PRESENCE OF CORONARY ANGIOPLASTY IMPLANT AND GRAFT: Chronic | ICD-10-CM

## 2019-09-06 DIAGNOSIS — Z29.9 ENCOUNTER FOR PROPHYLACTIC MEASURES, UNSPECIFIED: ICD-10-CM

## 2019-09-06 DIAGNOSIS — R07.89 OTHER CHEST PAIN: ICD-10-CM

## 2019-09-06 DIAGNOSIS — E78.5 HYPERLIPIDEMIA, UNSPECIFIED: ICD-10-CM

## 2019-09-06 DIAGNOSIS — R07.9 CHEST PAIN, UNSPECIFIED: ICD-10-CM

## 2019-09-06 DIAGNOSIS — I25.10 ATHEROSCLEROTIC HEART DISEASE OF NATIVE CORONARY ARTERY WITHOUT ANGINA PECTORIS: ICD-10-CM

## 2019-09-06 DIAGNOSIS — E03.9 HYPOTHYROIDISM, UNSPECIFIED: ICD-10-CM

## 2019-09-06 DIAGNOSIS — I10 ESSENTIAL (PRIMARY) HYPERTENSION: ICD-10-CM

## 2019-09-06 DIAGNOSIS — E11.9 TYPE 2 DIABETES MELLITUS WITHOUT COMPLICATIONS: ICD-10-CM

## 2019-09-06 DIAGNOSIS — Z90.49 ACQUIRED ABSENCE OF OTHER SPECIFIED PARTS OF DIGESTIVE TRACT: Chronic | ICD-10-CM

## 2019-09-06 DIAGNOSIS — Z98.49 CATARACT EXTRACTION STATUS, UNSPECIFIED EYE: Chronic | ICD-10-CM

## 2019-09-06 LAB
ALBUMIN SERPL ELPH-MCNC: 4.2 G/DL — SIGNIFICANT CHANGE UP (ref 3.3–5)
ALP SERPL-CCNC: 82 U/L — SIGNIFICANT CHANGE UP (ref 40–120)
ALT FLD-CCNC: 11 U/L — SIGNIFICANT CHANGE UP (ref 4–33)
ANION GAP SERPL CALC-SCNC: 11 MMO/L — SIGNIFICANT CHANGE UP (ref 7–14)
APTT BLD: 28.2 SEC — SIGNIFICANT CHANGE UP (ref 27.5–36.3)
AST SERPL-CCNC: 11 U/L — SIGNIFICANT CHANGE UP (ref 4–32)
BASE EXCESS BLDV CALC-SCNC: 6.2 MMOL/L — SIGNIFICANT CHANGE UP
BILIRUB SERPL-MCNC: 0.4 MG/DL — SIGNIFICANT CHANGE UP (ref 0.2–1.2)
BLOOD GAS VENOUS - CREATININE: 0.8 MG/DL — SIGNIFICANT CHANGE UP (ref 0.5–1.3)
BLOOD GAS VENOUS - FIO2: 21 — SIGNIFICANT CHANGE UP
BUN SERPL-MCNC: 14 MG/DL — SIGNIFICANT CHANGE UP (ref 7–23)
CALCIUM SERPL-MCNC: 9.1 MG/DL — SIGNIFICANT CHANGE UP (ref 8.4–10.5)
CHLORIDE BLDV-SCNC: 102 MMOL/L — SIGNIFICANT CHANGE UP (ref 96–108)
CHLORIDE SERPL-SCNC: 99 MMOL/L — SIGNIFICANT CHANGE UP (ref 98–107)
CO2 SERPL-SCNC: 28 MMOL/L — SIGNIFICANT CHANGE UP (ref 22–31)
CREAT SERPL-MCNC: 0.85 MG/DL — SIGNIFICANT CHANGE UP (ref 0.5–1.3)
GAS PNL BLDV: 134 MMOL/L — LOW (ref 136–146)
GLUCOSE BLDC GLUCOMTR-MCNC: 112 MG/DL — HIGH (ref 70–99)
GLUCOSE BLDC GLUCOMTR-MCNC: 123 MG/DL — HIGH (ref 70–99)
GLUCOSE BLDV-MCNC: 121 MG/DL — HIGH (ref 70–99)
GLUCOSE SERPL-MCNC: 116 MG/DL — HIGH (ref 70–99)
HCO3 BLDV-SCNC: 28 MMOL/L — HIGH (ref 20–27)
HCT VFR BLD CALC: 40.2 % — SIGNIFICANT CHANGE UP (ref 34.5–45)
HCT VFR BLDV CALC: 36.9 % — SIGNIFICANT CHANGE UP (ref 34.5–45)
HGB BLD-MCNC: 11.8 G/DL — SIGNIFICANT CHANGE UP (ref 11.5–15.5)
HGB BLDV-MCNC: 12 G/DL — SIGNIFICANT CHANGE UP (ref 11.5–15.5)
INR BLD: 1.05 — SIGNIFICANT CHANGE UP (ref 0.88–1.17)
LACTATE BLDV-MCNC: 1.3 MMOL/L — SIGNIFICANT CHANGE UP (ref 0.5–2)
LIDOCAIN IGE QN: 14.4 U/L — SIGNIFICANT CHANGE UP (ref 7–60)
MAGNESIUM SERPL-MCNC: 2.2 MG/DL — SIGNIFICANT CHANGE UP (ref 1.6–2.6)
MCHC RBC-ENTMCNC: 23.1 PG — LOW (ref 27–34)
MCHC RBC-ENTMCNC: 29.4 % — LOW (ref 32–36)
MCV RBC AUTO: 78.8 FL — LOW (ref 80–100)
NRBC # FLD: 0 K/UL — SIGNIFICANT CHANGE UP (ref 0–0)
NT-PROBNP SERPL-SCNC: 84.36 PG/ML — SIGNIFICANT CHANGE UP
PCO2 BLDV: 64 MMHG — HIGH (ref 41–51)
PH BLDV: 7.32 PH — SIGNIFICANT CHANGE UP (ref 7.32–7.43)
PLATELET # BLD AUTO: 260 K/UL — SIGNIFICANT CHANGE UP (ref 150–400)
PMV BLD: 11 FL — SIGNIFICANT CHANGE UP (ref 7–13)
PO2 BLDV: 42 MMHG — HIGH (ref 35–40)
POTASSIUM BLDV-SCNC: 4.3 MMOL/L — SIGNIFICANT CHANGE UP (ref 3.4–4.5)
POTASSIUM SERPL-MCNC: 4.5 MMOL/L — SIGNIFICANT CHANGE UP (ref 3.5–5.3)
POTASSIUM SERPL-SCNC: 4.5 MMOL/L — SIGNIFICANT CHANGE UP (ref 3.5–5.3)
PROT SERPL-MCNC: 7.1 G/DL — SIGNIFICANT CHANGE UP (ref 6–8.3)
PROTHROM AB SERPL-ACNC: 12 SEC — SIGNIFICANT CHANGE UP (ref 9.8–13.1)
RBC # BLD: 5.1 M/UL — SIGNIFICANT CHANGE UP (ref 3.8–5.2)
RBC # FLD: 17.3 % — HIGH (ref 10.3–14.5)
SAO2 % BLDV: 69.4 % — SIGNIFICANT CHANGE UP (ref 60–85)
SODIUM SERPL-SCNC: 138 MMOL/L — SIGNIFICANT CHANGE UP (ref 135–145)
TROPONIN T, HIGH SENSITIVITY: 40 NG/L — SIGNIFICANT CHANGE UP (ref ?–14)
TROPONIN T, HIGH SENSITIVITY: 42 NG/L — SIGNIFICANT CHANGE UP (ref ?–14)
WBC # BLD: 12.2 K/UL — HIGH (ref 3.8–10.5)
WBC # FLD AUTO: 12.2 K/UL — HIGH (ref 3.8–10.5)

## 2019-09-06 PROCEDURE — 74177 CT ABD & PELVIS W/CONTRAST: CPT | Mod: 26

## 2019-09-06 PROCEDURE — 71045 X-RAY EXAM CHEST 1 VIEW: CPT | Mod: 26

## 2019-09-06 PROCEDURE — 76705 ECHO EXAM OF ABDOMEN: CPT | Mod: 26

## 2019-09-06 RX ORDER — ENOXAPARIN SODIUM 100 MG/ML
40 INJECTION SUBCUTANEOUS DAILY
Refills: 0 | Status: DISCONTINUED | OUTPATIENT
Start: 2019-09-06 | End: 2019-09-13

## 2019-09-06 RX ORDER — LOSARTAN POTASSIUM 100 MG/1
1 TABLET, FILM COATED ORAL
Qty: 0 | Refills: 0 | DISCHARGE

## 2019-09-06 RX ORDER — DEXTROSE 50 % IN WATER 50 %
12.5 SYRINGE (ML) INTRAVENOUS ONCE
Refills: 0 | Status: DISCONTINUED | OUTPATIENT
Start: 2019-09-06 | End: 2019-09-13

## 2019-09-06 RX ORDER — RANOLAZINE 500 MG/1
500 TABLET, FILM COATED, EXTENDED RELEASE ORAL
Refills: 0 | Status: DISCONTINUED | OUTPATIENT
Start: 2019-09-06 | End: 2019-09-12

## 2019-09-06 RX ORDER — CEFTRIAXONE 500 MG/1
1000 INJECTION, POWDER, FOR SOLUTION INTRAMUSCULAR; INTRAVENOUS EVERY 24 HOURS
Refills: 0 | Status: DISCONTINUED | OUTPATIENT
Start: 2019-09-07 | End: 2019-09-08

## 2019-09-06 RX ORDER — INFLUENZA VIRUS VACCINE 15; 15; 15; 15 UG/.5ML; UG/.5ML; UG/.5ML; UG/.5ML
0.5 SUSPENSION INTRAMUSCULAR ONCE
Refills: 0 | Status: DISCONTINUED | OUTPATIENT
Start: 2019-09-06 | End: 2019-09-13

## 2019-09-06 RX ORDER — FUROSEMIDE 40 MG
20 TABLET ORAL DAILY
Refills: 0 | Status: DISCONTINUED | OUTPATIENT
Start: 2019-09-06 | End: 2019-09-09

## 2019-09-06 RX ORDER — CEFTRIAXONE 500 MG/1
1000 INJECTION, POWDER, FOR SOLUTION INTRAMUSCULAR; INTRAVENOUS ONCE
Refills: 0 | Status: COMPLETED | OUTPATIENT
Start: 2019-09-06 | End: 2019-09-06

## 2019-09-06 RX ORDER — ATORVASTATIN CALCIUM 80 MG/1
20 TABLET, FILM COATED ORAL AT BEDTIME
Refills: 0 | Status: DISCONTINUED | OUTPATIENT
Start: 2019-09-06 | End: 2019-09-13

## 2019-09-06 RX ORDER — LEVOTHYROXINE SODIUM 125 MCG
0.5 TABLET ORAL
Qty: 0 | Refills: 0 | DISCHARGE

## 2019-09-06 RX ORDER — ACETAMINOPHEN 500 MG
650 TABLET ORAL EVERY 6 HOURS
Refills: 0 | Status: DISCONTINUED | OUTPATIENT
Start: 2019-09-06 | End: 2019-09-13

## 2019-09-06 RX ORDER — METOPROLOL TARTRATE 50 MG
25 TABLET ORAL
Refills: 0 | Status: DISCONTINUED | OUTPATIENT
Start: 2019-09-06 | End: 2019-09-13

## 2019-09-06 RX ORDER — INSULIN LISPRO 100/ML
VIAL (ML) SUBCUTANEOUS
Refills: 0 | Status: DISCONTINUED | OUTPATIENT
Start: 2019-09-06 | End: 2019-09-13

## 2019-09-06 RX ORDER — ASPIRIN/CALCIUM CARB/MAGNESIUM 324 MG
162 TABLET ORAL ONCE
Refills: 0 | Status: COMPLETED | OUTPATIENT
Start: 2019-09-06 | End: 2019-09-06

## 2019-09-06 RX ORDER — SODIUM CHLORIDE 9 MG/ML
1000 INJECTION, SOLUTION INTRAVENOUS
Refills: 0 | Status: DISCONTINUED | OUTPATIENT
Start: 2019-09-06 | End: 2019-09-13

## 2019-09-06 RX ORDER — ASPIRIN/CALCIUM CARB/MAGNESIUM 324 MG
81 TABLET ORAL DAILY
Refills: 0 | Status: DISCONTINUED | OUTPATIENT
Start: 2019-09-06 | End: 2019-09-09

## 2019-09-06 RX ORDER — CITALOPRAM 10 MG/1
20 TABLET, FILM COATED ORAL DAILY
Refills: 0 | Status: DISCONTINUED | OUTPATIENT
Start: 2019-09-06 | End: 2019-09-12

## 2019-09-06 RX ORDER — DEXTROSE 50 % IN WATER 50 %
25 SYRINGE (ML) INTRAVENOUS ONCE
Refills: 0 | Status: DISCONTINUED | OUTPATIENT
Start: 2019-09-06 | End: 2019-09-13

## 2019-09-06 RX ORDER — PANTOPRAZOLE SODIUM 20 MG/1
40 TABLET, DELAYED RELEASE ORAL
Refills: 0 | Status: DISCONTINUED | OUTPATIENT
Start: 2019-09-06 | End: 2019-09-13

## 2019-09-06 RX ORDER — FAMOTIDINE 10 MG/ML
20 INJECTION INTRAVENOUS ONCE
Refills: 0 | Status: COMPLETED | OUTPATIENT
Start: 2019-09-06 | End: 2019-09-06

## 2019-09-06 RX ORDER — LEVOTHYROXINE SODIUM 125 MCG
75 TABLET ORAL DAILY
Refills: 0 | Status: DISCONTINUED | OUTPATIENT
Start: 2019-09-06 | End: 2019-09-13

## 2019-09-06 RX ORDER — ATORVASTATIN CALCIUM 80 MG/1
1 TABLET, FILM COATED ORAL
Qty: 0 | Refills: 0 | DISCHARGE

## 2019-09-06 RX ORDER — GLUCAGON INJECTION, SOLUTION 0.5 MG/.1ML
1 INJECTION, SOLUTION SUBCUTANEOUS ONCE
Refills: 0 | Status: DISCONTINUED | OUTPATIENT
Start: 2019-09-06 | End: 2019-09-13

## 2019-09-06 RX ORDER — DEXTROSE 50 % IN WATER 50 %
15 SYRINGE (ML) INTRAVENOUS ONCE
Refills: 0 | Status: DISCONTINUED | OUTPATIENT
Start: 2019-09-06 | End: 2019-09-13

## 2019-09-06 RX ORDER — TICAGRELOR 90 MG/1
60 TABLET ORAL
Refills: 0 | Status: DISCONTINUED | OUTPATIENT
Start: 2019-09-06 | End: 2019-09-09

## 2019-09-06 RX ORDER — INSULIN LISPRO 100/ML
VIAL (ML) SUBCUTANEOUS AT BEDTIME
Refills: 0 | Status: DISCONTINUED | OUTPATIENT
Start: 2019-09-06 | End: 2019-09-13

## 2019-09-06 RX ORDER — AZITHROMYCIN 500 MG/1
500 TABLET, FILM COATED ORAL ONCE
Refills: 0 | Status: COMPLETED | OUTPATIENT
Start: 2019-09-06 | End: 2019-09-06

## 2019-09-06 RX ORDER — LANOLIN ALCOHOL/MO/W.PET/CERES
6 CREAM (GRAM) TOPICAL ONCE
Refills: 0 | Status: COMPLETED | OUTPATIENT
Start: 2019-09-06 | End: 2019-09-06

## 2019-09-06 RX ORDER — AZITHROMYCIN 500 MG/1
500 TABLET, FILM COATED ORAL EVERY 24 HOURS
Refills: 0 | Status: DISCONTINUED | OUTPATIENT
Start: 2019-09-07 | End: 2019-09-07

## 2019-09-06 RX ORDER — SERTRALINE 25 MG/1
1 TABLET, FILM COATED ORAL
Qty: 0 | Refills: 0 | DISCHARGE

## 2019-09-06 RX ORDER — LINAGLIPTIN 5 MG/1
1 TABLET, FILM COATED ORAL
Qty: 0 | Refills: 0 | DISCHARGE

## 2019-09-06 RX ORDER — PANTOPRAZOLE SODIUM 20 MG/1
1 TABLET, DELAYED RELEASE ORAL
Qty: 0 | Refills: 0 | DISCHARGE

## 2019-09-06 RX ORDER — METOPROLOL TARTRATE 50 MG
1 TABLET ORAL
Qty: 0 | Refills: 0 | DISCHARGE

## 2019-09-06 RX ADMIN — FAMOTIDINE 20 MILLIGRAM(S): 10 INJECTION INTRAVENOUS at 06:07

## 2019-09-06 RX ADMIN — Medication 30 MILLILITER(S): at 06:07

## 2019-09-06 RX ADMIN — Medication 6 MILLIGRAM(S): at 23:01

## 2019-09-06 RX ADMIN — Medication 162 MILLIGRAM(S): at 06:07

## 2019-09-06 RX ADMIN — ATORVASTATIN CALCIUM 20 MILLIGRAM(S): 80 TABLET, FILM COATED ORAL at 23:02

## 2019-09-06 RX ADMIN — TICAGRELOR 60 MILLIGRAM(S): 90 TABLET ORAL at 17:44

## 2019-09-06 RX ADMIN — Medication 20 MILLIGRAM(S): at 16:57

## 2019-09-06 RX ADMIN — Medication 25 MILLIGRAM(S): at 17:04

## 2019-09-06 RX ADMIN — CEFTRIAXONE 100 MILLIGRAM(S): 500 INJECTION, POWDER, FOR SOLUTION INTRAMUSCULAR; INTRAVENOUS at 13:47

## 2019-09-06 RX ADMIN — Medication 650 MILLIGRAM(S): at 17:45

## 2019-09-06 RX ADMIN — Medication 650 MILLIGRAM(S): at 16:57

## 2019-09-06 RX ADMIN — ENOXAPARIN SODIUM 40 MILLIGRAM(S): 100 INJECTION SUBCUTANEOUS at 16:57

## 2019-09-06 RX ADMIN — AZITHROMYCIN 250 MILLIGRAM(S): 500 TABLET, FILM COATED ORAL at 17:44

## 2019-09-06 RX ADMIN — RANOLAZINE 500 MILLIGRAM(S): 500 TABLET, FILM COATED, EXTENDED RELEASE ORAL at 17:44

## 2019-09-06 RX ADMIN — Medication 81 MILLIGRAM(S): at 16:57

## 2019-09-06 RX ADMIN — CITALOPRAM 20 MILLIGRAM(S): 10 TABLET, FILM COATED ORAL at 16:57

## 2019-09-06 NOTE — ED PROVIDER NOTE - ATTENDING CONTRIBUTION TO CARE
MD Sorensen:  I performed a face to face bedside interview with patient regarding history of present illness, review of symptoms and past medical history. I completed an independent physical exam(documented below).  I have discussed patient's plan of care with resident.   I agree with note as stated above, having amended the EMR as needed to reflect my findings. I have discussed the assessment and plan of care.  This includes during the time I functioned as the attending physician for this patient.  PE:  Gen: Alert, NAD  Head: NC, AT,  EOMI, normal lids/conjunctiva  ENT:  normal hearing, patent oropharynx without erythema/exudate  Neck: +supple, no tenderness/meningismus/JVD, +Trachea midline  Chest: no chest wall tenderness, equal chest rise  Pulm: Bilateral BS, normal resp effort, no wheeze/stridor/retractions  CV: RRR, no M/R/G, +dist pulses  Abd: +BS, soft, NT/ND  Rectal: deferred  Mskel: no edema/erythema/cyanosis  Skin: no rash  Neuro: AAOx3  MDM:   83yo F w/ pmh of htn, DM, GERD, CAD s/p 3 stents, c/o sharp intermittent cp w/ rads to RUQ, non-exertional, no sob or diaphoresis. Negative nuclear stress 2wks ago. Labs, imaging, likely admission.

## 2019-09-06 NOTE — ED PROVIDER NOTE - PMH
Asthma    CAD (coronary artery disease)    CAD (coronary artery disease)    CAD (Coronary Atherosclerotic Disease)  2 yrs ago  Coronary Stent  Viktor dRCA 2010  DM (diabetes mellitus)    Esophageal tear  s/p clipping  GERD (Gastroesophageal Reflux Disease)    High Cholesterol    HTN (Hypertension)    HTN (hypertension)    Hyperlipidemia    Hypertension    Hypothyroid    Hypothyroidism    MI (myocardial infarction)    Paroxysmal atrial fibrillation    Systolic heart failure    Ventricular fibrillation  on Life Vest

## 2019-09-06 NOTE — ED ADULT NURSE NOTE - NSIMPLEMENTINTERV_GEN_ALL_ED
Implemented All Fall Risk Interventions:  [place] to call system. Call bell, personal items and telephone within reach. Instruct patient to call for assistance. Room bathroom lighting operational. Non-slip footwear when patient is off stretcher. Physically safe environment: no spills, clutter or unnecessary equipment. Stretcher in lowest position, wheels locked, appropriate side rails in place. Provide visual cue, wrist band, yellow gown, etc. Monitor gait and stability. Monitor for mental status changes and reorient to person, place, and time. Review medications for side effects contributing to fall risk. Reinforce activity limits and safety measures with patient and family.

## 2019-09-06 NOTE — ED PROVIDER NOTE - PHYSICAL EXAMINATION
general: well appearing female, no acute distress   heent: normocephalic, atraumatic   respiratory: normal work of breathing, lungs clear to auscultation bilaterally   cardiac: regular rate and rhythm   abdomen: soft, nont-tender   msk: no swelling or tenderness of lower extremities   skin: warm, dry   neuro: A&Ox3

## 2019-09-06 NOTE — ED PROVIDER NOTE - CLINICAL SUMMARY MEDICAL DECISION MAKING FREE TEXT BOX
84F presenting with chest pain. pain is non-exertional, unknown triggers, radiates to rUQ. concern for acs. plan for labs, cxr, ruq u/s. will reassess.

## 2019-09-06 NOTE — H&P ADULT - NSHPSOCIALHISTORY_GEN_ALL_CORE
Pt is  and lives with . She ambulates with a cane or walker and has a home health aide 7 hours/day. She denies drinking.

## 2019-09-06 NOTE — ED PROVIDER NOTE - CARE PLAN
Principal Discharge DX:	Chest pain  Secondary Diagnosis:	Pneumonia  Secondary Diagnosis:	Abdominal pain

## 2019-09-06 NOTE — H&P ADULT - PROBLEM SELECTOR PLAN 1
Chest pain atypical likely in the setting of pleurisy from underlying pneumonia  EKG shows NSR at 61 bpm with PACs, LAD  Delta troponin negative 42-->40  CXR shows clear lungs  US abdomen shows no acute findings  Will continue Tylenol PRN for pain  Recent pharm NST two weeks ago negative  Cards consult with Dr. Manpreet Dotson called  Likely no further inpatient cardiac workup needed  Discussed with attending

## 2019-09-06 NOTE — ED PROVIDER NOTE - OBJECTIVE STATEMENT
84F, pmh of CAD with 3 stents, DM, htn, presenting with chest pain. patient reports intermittent sharp chest pain that radiates to ruq. unknown trigger, does not know what makes the pain worse. currently has no chest pain. had nuclear stress 2 weeks go that was negative. denies any fever, difficulty breathing, abdominal pain, nausea, vomiting, pain or swelling of lower extremities.

## 2019-09-06 NOTE — H&P ADULT - NSICDXPASTMEDICALHX_GEN_ALL_CORE_FT
PAST MEDICAL HISTORY:  Asthma     CAD (coronary artery disease) S/P stent placement    DM (diabetes mellitus)     Esophageal tear S/P clipping    HLD (hyperlipidemia)     HTN (hypertension)     Hypothyroidism

## 2019-09-06 NOTE — H&P ADULT - NEUROLOGICAL DETAILS
cranial nerves intact/normal strength/responds to pain/sensation intact/alert and oriented x 3/responds to verbal commands

## 2019-09-06 NOTE — H&P ADULT - NEGATIVE MUSCULOSKELETAL SYMPTOMS
no leg pain L/no arthralgia/no myalgia/no back pain/no leg pain R/no stiffness/no neck pain/no arm pain L/no arm pain R

## 2019-09-06 NOTE — H&P ADULT - RS GEN PE MLT RESP DETAILS PC
airway patent/chest wall tenderness/no rhonchi/no rales/respirations non-labored/no wheezes/clear to auscultation bilaterally/no intercostal retractions/breath sounds equal/good air movement

## 2019-09-06 NOTE — H&P ADULT - NEGATIVE GASTROINTESTINAL SYMPTOMS
no vomiting/no melena/no constipation/no abdominal pain/no flatulence/no change in bowel habits/no hematochezia/no diarrhea

## 2019-09-06 NOTE — H&P ADULT - NSICDXPASTSURGICALHX_GEN_ALL_CORE_FT
PAST SURGICAL HISTORY:  S/P cataract extraction     S/P cholecystectomy     S/P coronary artery stent placement

## 2019-09-06 NOTE — H&P ADULT - PROBLEM SELECTOR PLAN 2
CT abdomen and pelvis shows new RLL tree bud opacities  With leukocytosis, will treat as CAP  Start Zithromax and Ceftriaxone IV  Urine and blood cultures and urine legionella ordered  Tylenol PRN for fever/pain  Hydration as needed

## 2019-09-06 NOTE — ED ADULT NURSE NOTE - OBJECTIVE STATEMENT
pt is A&Ox3, ambulatory, able to make needs known and presents multiple medical complaints such as chest pain and ab pain that began today with SOB that is worsening in nature x 1 week. At time of assessment PT reports chest/ab pain as burning and presents with an increased work of breath, placed on 3l nc. PT denies home O2 use. PT has extensive cardia HX, will monitor for needs of care

## 2019-09-06 NOTE — H&P ADULT - GEN GEN HX ROS MEA POS PC
fatigue/See HPI/weakness/chills
[email or web address]

## 2019-09-06 NOTE — ED ADULT NURSE NOTE - CHIEF COMPLAINT QUOTE
BIBEMS for generalized body pain since 7am. Patient has PMH 3 cardiac stents (2010, 2012, 2015), HTN, DM. Was given 1 Tylenol @2030p, 1 nitro @2230 and another @0100. Patient A/Ox4, vitally stable. EKG in progress.

## 2019-09-06 NOTE — ED PROVIDER NOTE - PROGRESS NOTE DETAILS
Rudy PGY3: Patient reassessed, significant abd discomfort at the RUQ and right mid quadrant, reported diarrhea and nausea per her daughter as well as loss of appetite - x 2 months, will eval w/ ct a/p, then will call cardiologist Dr Manpreet Dotson [place] PGY3: Pt still with some abd discomfort; now on NC 1-2L,. does not appear tachypneic or uncomfortable but still not tolerating po despite unremarkable abd/pelvis ct findings. There is evidence of peripheral tree in bud opacities of the right lung, in the setting of right sided pain, nausea, small leukocytosis and now oxygen requirement--- likely pneumonia that was initially not suspected given clear cxr- will obtain blood cultures and cover for CAP. Endorsed to Dr Kang who is covering for Dr Ziegler while he is away, accepted for chest pain and pneumonia.

## 2019-09-06 NOTE — H&P ADULT - ASSESSMENT
85 y/o Sam speaking female with a PMHx of CAD S/P stent placement, VF S/P life vest, HTN, HLD, pre-DM, GERD, and hypothyroidism presents to ED with pleuritic right sided chest pain in the setting of right lower lobe pneumonia.

## 2019-09-06 NOTE — PROGRESS NOTE ADULT - SUBJECTIVE AND OBJECTIVE BOX
chief complaint: chest pain  83 y/o Sam speaking female with CAD S/P MI and LAD and RCA stent placement, VF S/P life vest, HTN, HLD, pre-DM, GERD, and hypothyroidism presents to ED with chest pain for the past couple weeks. Pt reports that she has been experiencing intermittent, 10/10, non-exertional, non-dietary, pleuritic, reproducible, substernal chest pain with radiation to the right lower chest and throat. Pt states that her chest pain is made worse when she takes a deep breath or when she presses on her chest but she does not have any exertional or positional component. Pt also admits to shortness of breath, which is made worse on exertion, and associated with nausea, chills, fatigue and diffuse weakness. Pt has been to an outside hospital ER with no findings. Pt had nuclear stress test two weeks ago, in my office which was normal. Pt denies fever, recent travel, sick contacts, headache, dizziness, visual deficits, orthopnea, palpitations, abdominal pain, V/D/C, hematochezia, melena, dysuria, hematuria, LOC, syncope, peripheral edema. Upon arrival to ED, EKG revealed NSR at 61 bpm with PACs, LAD. CE x2: Trop 42-->40.     Review of Systems:  · Negative General Symptoms	no fever; no sweating; no anorexia; no weight loss; no weight gain	  · General Symptoms	chills; fatigue; weakness; See HPI	  · Negative Skin Symptoms	no rash; no itching; no dryness	  · Negative Ophthalmologic Symptoms	no diplopia; no photophobia; no blurred vision L; no blurred vision R; no pain L; no pain R; no loss of vision L; no loss of vision R	  · Negative ENMT Symptoms	no hearing difficulty; no ear pain; no tinnitus; no vertigo	  · Negative Respiratory and Thorax Symptoms	no wheezing; no cough; no hemoptysis	  · Respiratory and Thorax Symptoms	dyspnea  pleuritic chest pain  See HPI	  · Negative Cardiovascular Symptoms	no palpitations; no orthopnea; no paroxysmal nocturnal dyspnea; no peripheral edema; no claudication	  · Cardiovascular Symptoms	chest pain; dyspnea on exertion; See HPI	  · Negative Gastrointestinal Symptoms	no vomiting; no diarrhea; no constipation; no change in bowel habits; no flatulence; no abdominal pain; no melena; no hematochezia	  · Gastrointestinal Symptoms	nausea	  · Negative General Genitourinary Symptoms	no hematuria; no flank pain L; no flank pain R; no incontinence; no dysuria; no urinary hesitancy	  · Negative Musculoskeletal Symptoms	no arthralgia; no myalgia; no stiffness; no neck pain; no arm pain L; no arm pain R; no back pain; no leg pain L; no leg pain R	  · Negative Neurological Symptoms	no transient paralysis; no weakness; no paresthesias; no generalized seizures; no focal seizures; no syncope; no tremors; no vertigo; no loss of sensation; no difficulty walking; no headache; no loss of consciousness; no hemiparesis; no confusion; no facial palsy	  · Psychiatric	not applicable	  · Hematology/Lymphatics	negative	  · Negative Endocrine Symptoms	no cold intolerance; no heat intolerance	  · Allergic/Immunologic	not applicable	      Allergies and Intolerances:        Allergies:  	No Known Allergies:        Intolerances:  	Remeron: Drug, Unknown  	Lexapro: Drug, Unknown, palps, anxiety, hot/cold flashes    Home Medications:   * Patient Currently Takes Medications as of 06-Sep-2019 13:17 documented in Structured Notes  · 	levothyroxine 75 mcg (0.075 mg) oral tablet: Last Dose Taken:  , 1 tab(s) orally once a day  · 	Metoprolol Tartrate 25 mg oral tablet: Last Dose Taken:  , 1 tab(s) orally 2 times a day  · 	Brilinta (ticagrelor) 60 mg oral tablet: Last Dose Taken:  , 1 tab(s) orally 2 times a day  · 	aspirin 81 mg oral tablet: Last Dose Taken:  , 1 tab(s) orally once a day  · 	pantoprazole 40 mg oral delayed release tablet: Last Dose Taken:  , 1 tab(s) orally once a day  · 	CeleXA 20 mg oral tablet: Last Dose Taken:  , 1 tab(s) orally once a day  · 	Ranexa 500 mg oral tablet, extended release: Last Dose Taken:  , 1 tab(s) orally 2 times a day  · 	Lasix 20 mg oral tablet: Last Dose Taken:  , 1 tab(s) orally once a day  · 	atorvastatin 20 mg oral tablet: Last Dose Taken:  , 1 tab(s) orally once a day    Patient History:    Past Medical, Past Surgical, and Family History:  PAST MEDICAL HISTORY:  Asthma     CAD (coronary artery disease) S/P stent placement    DM (diabetes mellitus)     Esophageal tear S/P clipping    HLD (hyperlipidemia)     HTN (hypertension)     Hypothyroidism.     PAST SURGICAL HISTORY:  S/P cataract extraction     S/P cholecystectomy     S/P coronary artery stent placement.     FAMILY HISTORY:  No pertinent family history in first degree relatives.     No Pertinent Family History in first degree relatives of: Immediate family.     Social History:  Social History (marital status, living situation, occupation, tobacco use, alcohol and drug use, and sexual history): Pt is  and lives with . She ambulates with a cane or walker and has a home health aide 7 hours/day. She denies drinking.	     Tobacco Screening:  · Core Measure Site	No	  · Has the patient used tobacco in the past 30 days?	No	    Risk Assessment:    Present on Admission:  Deep Venous Thrombosis	no	  Pulmonary Embolus	no	  Urinary Catheter	no	  Central Venous Catheter/PICC Line	no	  Surgical Site Incision	no	  Pressure Ulcer(s)	no	     Heart Failure:  Does this patient have a history of or has been diagnosed with heart failure? no.       Height/Weight/BSA/BMI:  · Height (FEET)	5 Feet	  · Height (INCHES)	3 Inch(s)	  · Height (CENTIMETERS)	160.02 Centimeter(s)	  · 	 used	  · Dosing Weight (KILOGRAMS)	57.2 kg	  · Dosing Weight  (POUNDS)	126.1 Pound(s)	  · BSA (m2)	1.59 Meter Squared	  · BMI (kG/m2)	22.3	     Physical Exam:  · Constitutional	detailed exam	  · Constitutional Details	well-developed; well-groomed; well-nourished; no distress	  · Eyes	detailed exam	  · Eyes Details	PERRL; EOMI; conjunctiva clear	  · ENMT	detailed exam	  · ENMT Details	mouth	  · Mouth	normal mouth and gums; moist	  · Neck	detailed exam	  · Neck Details	supple; no JVD	  · Breasts	not examined	  · Back	detailed exam	  · Back Details	normal shape; ROM intact	  · Respiratory	detailed exam	  · Respiratory Details	airway patent; breath sounds equal; good air movement; respirations non-labored; clear to auscultation bilaterally; no intercostal retractions; no rales; no rhonchi; no wheezes; chest wall tenderness	  · Cardiovascular	detailed exam	  · Cardiovascular Details	regular rate and rhythm  no rub  no murmur	  · Cardiovascular Details	positive S1; positive S2; murmur	  · Gastrointestinal	detailed exam	  · GI Normal	soft; no distention; no masses palpable	  · GI Abnormal	tender	  · Tenderness	RUQ	  · Genitourinary	not examined	  · Rectal	not examined	  · Extremities	detailed exam	  · Extremities Details	no clubbing; no cyanosis; no pedal edema	  · Vascular	detailed exam	  · Carotid Pulse	right normal; left normal	  · Radial Pulse	right normal; left normal	  · DP Pulse	right normal; left normal	  · Neurological	detailed exam	  · Neurological Details	alert and oriented x 3; responds to pain; responds to verbal commands; sensation intact; cranial nerves intact; normal strength	  · Skin	detailed exam	  · Skin Details	warm and dry; color normal	  · Lymph Nodes	No lymphadedenopathy	  · Musculoskeletal	detailed exam	  · Musculoskeletal Details	ROM intact; no calf tenderness; normal strength	  · Psychiatric	detailed exam	  · Psychiatric Details	normal affect; normal behavior	       Labs and Results:  Labs, Radiology, Cardiology, and Other Results: EKG: NSR at 61 bpm with PACs, LAD CE x2: Trop 42-->40	    Radiology:   X-Ray, Fluoroscopy:	    06-Sep-2019 05:58, US Abdomen Limited	  PACS Image: Image(s) Available	    06-Sep-2019 06:31, Xray Chest 1 View- PORTABLE-Urgent	  PACS Image: Image(s) Available	  Xray Chest 1 View- PORTABLE-Urgent: EXAM:  XR CHEST PORTABLE URGENT 1V        PROCEDURE DATE:  Sep  6 2019         INTERPRETATION:  TIME OF EXAM: 2019 at 6:28 AM    CLINICAL INFORMATION: Chest pain    TECHNIQUE:   Portable chest    INTERPRETATION:     The lungs are clear. Coronary stent is present. Heart is difficult to   evaluate on this projection. No effusions or congestion to indicate CHF.      COMPARISON:  2017      IMPRESSION:  Clear lungs.                  GEORGIE HODGE M.D., ATTENDING RADIOLOGIST  This document has been electronically signed. Sep  6 2019  7:20AM	    06-Sep-2019 11:52, CT Abdomen and Pelvis w/ Oral Cont and w/ IV Cont	  PACS Image: Image(s) Available	  Non-Obstetrical Ultrasounds:	    06-Sep-2019 05:58, US Abdomen Limited	  US Abdomen Limited: EXAM:  US ABDOMEN LIMITED        PROCEDURE DATE:  Sep  6 2019         INTERPRETATION:  CLINICAL INFORMATION: Right upper quadrant pain    COMPARISON: None available.    TECHNIQUE: Sonography of the right upper quadrant.     FINDINGS:    Liver: Within normal limits.    Bile ducts: Mildly dilated common bile duct which is likely due to the   postcholecystectomy state.    Gallbladder: Cholecystectomy.        Pancreas: Visualized portions are within normal limits.    Right kidney: 8.4 cm. No hydronephrosis. A primarily anechoic lower pole   cyst measures 1.1 cm.    Ascites: None.    IVC: Visualized portions are within normal limits.    IMPRESSION:     Status post cholecystectomy. Otherwise unremarkable right upper quadrant   sonogram.              SHELBY GUZMAN M.D., RADIOLOGY RESIDENT  This document has been electronically signed.  FAWN BANKS M.D., RADIOLOGIST  This document has been electronically signed. Sep  6 2019  6:23AM	    EKG:   EK-Sep-2019 02:11, 12 Lead ECG	  12 Lead ECG: Ventricular Rate 57 BPM    Atrial Rate 57 BPM    P-R Interval 198 ms    QRS Duration 86 ms    Q-T Interval 424 ms    QTC Calculation(Bezet) 412 ms    P Axis 111 degrees    R Axis 235 degrees    T Axis 152 degrees    Diagnosis Line *** Suspect arm lead reversal, interpretation assumes no reversal  Sinus bradycardia  Anterolateral infarct , age undetermined  Abnormal ECG	    06-Sep-2019 02:13, 12 Lead ECG	  12 Lead ECG: Ventricular Rate 61 BPM    Atrial Rate 61 BPM    P-R Interval 198 ms    QRS Duration 96 ms    Q-T Interval 426 ms    QTC Calculation(Bezet) 428 ms    P Axis 75 degrees    R Axis -57 degrees    T Axis 39 degrees    Diagnosis Line Sinus rhythm with Premature atrial complexes  Left axis deviation  Minimal voltage criteria for LVH, may be normal variant  Anterolateral infarct , age undetermined  Abnormal ECG

## 2019-09-06 NOTE — PROGRESS NOTE ADULT - ASSESSMENT
83 y/o Sam speaking female with CAD S/P MI and LAD and RCA stent placement, VF S/P life vest, HTN, HLD, pre-DM, GERD, and hypothyroidism presents to ED with chest pain for the past couple weeks.    Chest pain due to Pneumonia    recent NST in my office 2 weeks ago negative for ischemia    rec:   continue home medications  IV antibiotics per Medical team  no further cardiac testing at this time

## 2019-09-06 NOTE — H&P ADULT - NEGATIVE NEUROLOGICAL SYMPTOMS
no vertigo/no loss of consciousness/no syncope/no tremors/no confusion/no paresthesias/no headache/no transient paralysis/no weakness/no loss of sensation/no difficulty walking/no facial palsy/no generalized seizures/no hemiparesis/no focal seizures

## 2019-09-06 NOTE — H&P ADULT - HISTORY OF PRESENT ILLNESS
85 y/o Sam speaking female with a PMHx of CAD S/P stent placement, VF S/P life vest, HTN, HLD, pre-DM, GERD, and hypothyroidism presents to ED with chest pain for the past couple weeks. Pt reports that she has been experiencing intermittent, 10/10, non-exertional, non-dietary, pleuritic, reproducible, substernal chest pain with radiation to the right lower chest and throat. Pt states that her chest pain is made worse when she takes a deep breath or when she presses on her chest but she does not have any exertional or positional component. Pt also admits to shortness of breath, which is made worse on exertion, and associated with nausea, chills, fatigue and diffuse weakness. Pt has been to an outside hospital ER with no findings. Pt saw her cardiologist, Dr. Manpreet Dotson, and underwent nuclear stress test two weeks ago, which was reportedly normal. Pt denies fever, recent travel, sick contacts, headache, dizziness, visual deficits, orthopnea, palpitations, abdominal pain, V/D/C, hematochezia, melena, dysuria, hematuria, LOC, syncope, peripheral edema. Upon arrival to ED, EKG revealed NSR at 61 bpm with PACs, LAD. CE x2: Trop 42-->40. CXR: Clear lungs. US abdomen: Status post cholecystectomy. Otherwise unremarkable right upper quadrant sonogram. CT abdomen and pelvis: Status post cholecystectomy. Mild intra and extra hepatic biliary ductal dilatation as noted on ultrasound performed the same day. Correlation with liver function tests is recommended. New tree-in-bud opacities in the right lower lobe. Prominent central low attenuation in the endometrial canal. Pelvic ultrasound may be obtained as indicated to assess for endometrial pathology. Pt is now admitted to telemetry.

## 2019-09-06 NOTE — H&P ADULT - PROBLEM SELECTOR PLAN 3
Chest pain atypical likely in the setting of pleurisy from underlying pneumonia  EKG shows NSR at 61 bpm with PACs, LAD  Delta troponin negative 42-->40  Continue ASA, Lipitor, Metoprolol, Ranexa  Recent pharm NST two weeks ago negative  Likely no further inpatient cardiac workup needed

## 2019-09-06 NOTE — H&P ADULT - NEGATIVE OPHTHALMOLOGIC SYMPTOMS
no diplopia/no loss of vision R/no photophobia/no pain R/no blurred vision R/no pain L/no blurred vision L/no loss of vision L

## 2019-09-07 LAB
ALBUMIN SERPL ELPH-MCNC: 4.2 G/DL — SIGNIFICANT CHANGE UP (ref 3.3–5)
ALP SERPL-CCNC: 83 U/L — SIGNIFICANT CHANGE UP (ref 40–120)
ALT FLD-CCNC: 13 U/L — SIGNIFICANT CHANGE UP (ref 4–33)
ANION GAP SERPL CALC-SCNC: 11 MMO/L — SIGNIFICANT CHANGE UP (ref 7–14)
AST SERPL-CCNC: 15 U/L — SIGNIFICANT CHANGE UP (ref 4–32)
BASOPHILS # BLD AUTO: 0.05 K/UL — SIGNIFICANT CHANGE UP (ref 0–0.2)
BASOPHILS NFR BLD AUTO: 0.4 % — SIGNIFICANT CHANGE UP (ref 0–2)
BILIRUB SERPL-MCNC: 0.5 MG/DL — SIGNIFICANT CHANGE UP (ref 0.2–1.2)
BUN SERPL-MCNC: 13 MG/DL — SIGNIFICANT CHANGE UP (ref 7–23)
CALCIUM SERPL-MCNC: 8.9 MG/DL — SIGNIFICANT CHANGE UP (ref 8.4–10.5)
CHLORIDE SERPL-SCNC: 94 MMOL/L — LOW (ref 98–107)
CHOLEST SERPL-MCNC: 138 MG/DL — SIGNIFICANT CHANGE UP (ref 120–199)
CO2 SERPL-SCNC: 31 MMOL/L — SIGNIFICANT CHANGE UP (ref 22–31)
CREAT SERPL-MCNC: 0.86 MG/DL — SIGNIFICANT CHANGE UP (ref 0.5–1.3)
EOSINOPHIL # BLD AUTO: 0.38 K/UL — SIGNIFICANT CHANGE UP (ref 0–0.5)
EOSINOPHIL NFR BLD AUTO: 3.1 % — SIGNIFICANT CHANGE UP (ref 0–6)
GLUCOSE BLDC GLUCOMTR-MCNC: 108 MG/DL — HIGH (ref 70–99)
GLUCOSE BLDC GLUCOMTR-MCNC: 110 MG/DL — HIGH (ref 70–99)
GLUCOSE BLDC GLUCOMTR-MCNC: 126 MG/DL — HIGH (ref 70–99)
GLUCOSE BLDC GLUCOMTR-MCNC: 146 MG/DL — HIGH (ref 70–99)
GLUCOSE SERPL-MCNC: 112 MG/DL — HIGH (ref 70–99)
HBA1C BLD-MCNC: 6.6 % — HIGH (ref 4–5.6)
HCT VFR BLD CALC: 37.3 % — SIGNIFICANT CHANGE UP (ref 34.5–45)
HDLC SERPL-MCNC: 51 MG/DL — SIGNIFICANT CHANGE UP (ref 45–65)
HGB BLD-MCNC: 11.3 G/DL — LOW (ref 11.5–15.5)
IMM GRANULOCYTES NFR BLD AUTO: 0.3 % — SIGNIFICANT CHANGE UP (ref 0–1.5)
L PNEUMO AG UR QL: NEGATIVE — SIGNIFICANT CHANGE UP
LIPID PNL WITH DIRECT LDL SERPL: 79 MG/DL — SIGNIFICANT CHANGE UP
LYMPHOCYTES # BLD AUTO: 29.8 % — SIGNIFICANT CHANGE UP (ref 13–44)
LYMPHOCYTES # BLD AUTO: 3.67 K/UL — HIGH (ref 1–3.3)
MAGNESIUM SERPL-MCNC: 2.1 MG/DL — SIGNIFICANT CHANGE UP (ref 1.6–2.6)
MCHC RBC-ENTMCNC: 23.1 PG — LOW (ref 27–34)
MCHC RBC-ENTMCNC: 30.3 % — LOW (ref 32–36)
MCV RBC AUTO: 76.1 FL — LOW (ref 80–100)
METHOD TYPE: SIGNIFICANT CHANGE UP
MONOCYTES # BLD AUTO: 1.13 K/UL — HIGH (ref 0–0.9)
MONOCYTES NFR BLD AUTO: 9.2 % — SIGNIFICANT CHANGE UP (ref 2–14)
NEUTROPHILS # BLD AUTO: 7.03 K/UL — SIGNIFICANT CHANGE UP (ref 1.8–7.4)
NEUTROPHILS NFR BLD AUTO: 57.2 % — SIGNIFICANT CHANGE UP (ref 43–77)
NRBC # FLD: 0.02 K/UL — SIGNIFICANT CHANGE UP (ref 0–0)
ORGANISM # SPEC MICROSCOPIC CNT: SIGNIFICANT CHANGE UP
ORGANISM # SPEC MICROSCOPIC CNT: SIGNIFICANT CHANGE UP
PHOSPHATE SERPL-MCNC: 3.9 MG/DL — SIGNIFICANT CHANGE UP (ref 2.5–4.5)
PLATELET # BLD AUTO: 273 K/UL — SIGNIFICANT CHANGE UP (ref 150–400)
PMV BLD: 10.8 FL — SIGNIFICANT CHANGE UP (ref 7–13)
POTASSIUM SERPL-MCNC: 4.9 MMOL/L — SIGNIFICANT CHANGE UP (ref 3.5–5.3)
POTASSIUM SERPL-SCNC: 4.9 MMOL/L — SIGNIFICANT CHANGE UP (ref 3.5–5.3)
PROT SERPL-MCNC: 6.8 G/DL — SIGNIFICANT CHANGE UP (ref 6–8.3)
RBC # BLD: 4.9 M/UL — SIGNIFICANT CHANGE UP (ref 3.8–5.2)
RBC # FLD: 17.3 % — HIGH (ref 10.3–14.5)
SODIUM SERPL-SCNC: 136 MMOL/L — SIGNIFICANT CHANGE UP (ref 135–145)
SPECIMEN SOURCE: SIGNIFICANT CHANGE UP
SPECIMEN SOURCE: SIGNIFICANT CHANGE UP
TRIGL SERPL-MCNC: 97 MG/DL — SIGNIFICANT CHANGE UP (ref 10–149)
TSH SERPL-MCNC: 1.7 UIU/ML — SIGNIFICANT CHANGE UP (ref 0.27–4.2)
WBC # BLD: 12.3 K/UL — HIGH (ref 3.8–10.5)
WBC # FLD AUTO: 12.3 K/UL — HIGH (ref 3.8–10.5)

## 2019-09-07 RX ADMIN — CEFTRIAXONE 100 MILLIGRAM(S): 500 INJECTION, POWDER, FOR SOLUTION INTRAMUSCULAR; INTRAVENOUS at 14:25

## 2019-09-07 RX ADMIN — Medication 81 MILLIGRAM(S): at 11:57

## 2019-09-07 RX ADMIN — Medication 75 MICROGRAM(S): at 06:40

## 2019-09-07 RX ADMIN — PANTOPRAZOLE SODIUM 40 MILLIGRAM(S): 20 TABLET, DELAYED RELEASE ORAL at 06:40

## 2019-09-07 RX ADMIN — ATORVASTATIN CALCIUM 20 MILLIGRAM(S): 80 TABLET, FILM COATED ORAL at 23:43

## 2019-09-07 RX ADMIN — RANOLAZINE 500 MILLIGRAM(S): 500 TABLET, FILM COATED, EXTENDED RELEASE ORAL at 06:40

## 2019-09-07 RX ADMIN — Medication 20 MILLIGRAM(S): at 06:40

## 2019-09-07 RX ADMIN — TICAGRELOR 60 MILLIGRAM(S): 90 TABLET ORAL at 11:57

## 2019-09-07 RX ADMIN — ENOXAPARIN SODIUM 40 MILLIGRAM(S): 100 INJECTION SUBCUTANEOUS at 16:50

## 2019-09-07 RX ADMIN — RANOLAZINE 500 MILLIGRAM(S): 500 TABLET, FILM COATED, EXTENDED RELEASE ORAL at 16:50

## 2019-09-07 RX ADMIN — TICAGRELOR 60 MILLIGRAM(S): 90 TABLET ORAL at 23:42

## 2019-09-07 RX ADMIN — Medication 25 MILLIGRAM(S): at 16:51

## 2019-09-07 RX ADMIN — CITALOPRAM 20 MILLIGRAM(S): 10 TABLET, FILM COATED ORAL at 11:57

## 2019-09-07 RX ADMIN — Medication 25 MILLIGRAM(S): at 06:40

## 2019-09-07 NOTE — CONSULT NOTE ADULT - PROBLEM SELECTOR RECOMMENDATION 9
pn ai not very impressive to me: Would DO CTA: she has no effusions too and no signs of pulmonary edema:

## 2019-09-07 NOTE — CONSULT NOTE ADULT - SUBJECTIVE AND OBJECTIVE BOX
Patient is a 84y old  Female who presents with a chief complaint of Chest pain (06 Sep 2019 17:31)      HPI:  85 y/o Sam speaking female with a PMHx of CAD S/P stent placement, VF S/P life vest, HTN, HLD, pre-DM, GERD, and hypothyroidism presents to ED with chest pain for the past couple weeks. Pt reports that she has been experiencing intermittent, 10/10, non-exertional, non-dietary, pleuritic, reproducible, substernal chest pain with radiation to the right lower chest and throat. Pt states that her chest pain is made worse when she takes a deep breath or when she presses on her chest but she does not have any exertional or positional component. Pt also admits to shortness of breath, which is made worse on exertion, and associated with nausea, chills, fatigue and diffuse weakness. Pt has been to an outside hospital ER with no findings. Pt saw her cardiologist, Dr. Manpreet Dotson, and underwent nuclear stress test two weeks ago, which was reportedly normal. Pt denies fever, recent travel, sick contacts, headache, dizziness, visual deficits, orthopnea, palpitations, abdominal pain, V/D/C, hematochezia, melena, dysuria, hematuria, LOC, syncope, peripheral edema. Upon arrival to ED, EKG revealed NSR at 61 bpm with PACs, LAD. CE x2: Trop 42-->40. CXR: Clear lungs. US abdomen: Status post cholecystectomy. Otherwise unremarkable right upper quadrant sonogram. CT abdomen and pelvis: Status post cholecystectomy. Mild intra and extra hepatic biliary ductal dilatation as noted on ultrasound performed the same day. Correlation with liver function tests is recommended. New tree-in-bud opacities in the right lower lobe. Prominent central low attenuation in the endometrial canal. Pelvic ultrasound may be obtained as indicated to assess for endometrial pathology. Pt is now admitted to telemetry. (06 Sep 2019 14:47)    pyulmonary called:  Pt says she h as asthma and takes some medications for it at home: She has no cough or phlegm and no ffever but she feesl CONSTANTINE   She s hasn ot been wheezing also:   ?FOLLOWING PRESENT  [ x] Hx of PE/DVT, [ x] Hx COPD, [ y] Hx of Asthma, [y ] Hx of Hospitalization, [x ]  Hx of BiPAP/CPAP use, [x ] Hx of BUFFY    Allergies    No Known Allergies    Intolerances    Lexapro (Unknown)  Remeron (Unknown)      PAST MEDICAL & SURGICAL HISTORY:  HLD (hyperlipidemia)  HTN (hypertension)  Hypothyroidism  Esophageal tear: S/P clipping  DM (diabetes mellitus)  CAD (coronary artery disease): S/P stent placement  Asthma  S/P cholecystectomy  S/P coronary artery stent placement  S/P cataract extraction      FAMILY HISTORY:  No pertinent family history in first degree relatives      Social History: [ x ] TOBACCO                  [x  ] ETOH                                 [ x ] IVDA/DRUGS    REVIEW OF SYSTEMS      General:	x    Skin/Breast:x  	  Ophthalmologic:x  	  ENMT:	x    Respiratory and Thorax: SOB, CHEST PAIN   	  Cardiovascular:	x    Gastrointestinal:	x    Genitourinary:	x    Musculoskeletal:	x    Neurological:	x    Psychiatric:	x    Hematology/Lymphatics:	x    Endocrine:	  x  Allergic/Immunologic:	x    MEDICATIONS  (STANDING):  aspirin enteric coated 81 milliGRAM(s) Oral daily  atorvastatin 20 milliGRAM(s) Oral at bedtime  azithromycin  IVPB 500 milliGRAM(s) IV Intermittent every 24 hours  cefTRIAXone   IVPB 1000 milliGRAM(s) IV Intermittent every 24 hours  citalopram 20 milliGRAM(s) Oral daily  dextrose 5%. 1000 milliLiter(s) (50 mL/Hr) IV Continuous <Continuous>  dextrose 50% Injectable 12.5 Gram(s) IV Push once  dextrose 50% Injectable 25 Gram(s) IV Push once  dextrose 50% Injectable 25 Gram(s) IV Push once  enoxaparin Injectable 40 milliGRAM(s) SubCutaneous daily  furosemide    Tablet 20 milliGRAM(s) Oral daily  influenza   Vaccine 0.5 milliLiter(s) IntraMuscular once  insulin lispro (HumaLOG) corrective regimen sliding scale   SubCutaneous three times a day before meals  insulin lispro (HumaLOG) corrective regimen sliding scale   SubCutaneous at bedtime  levothyroxine 75 MICROGram(s) Oral daily  metoprolol tartrate 25 milliGRAM(s) Oral two times a day  pantoprazole    Tablet 40 milliGRAM(s) Oral before breakfast  ranolazine 500 milliGRAM(s) Oral two times a day  ticagrelor 60 milliGRAM(s) Oral two times a day    MEDICATIONS  (PRN):  acetaminophen   Tablet .. 650 milliGRAM(s) Oral every 6 hours PRN Temp greater or equal to 38C (100.4F), Mild Pain (1 - 3), Moderate Pain (4 - 6)  dextrose 40% Gel 15 Gram(s) Oral once PRN Blood Glucose LESS THAN 70 milliGRAM(s)/deciliter  glucagon  Injectable 1 milliGRAM(s) IntraMuscular once PRN Glucose LESS THAN 70 milligrams/deciliter       Vital Signs Last 24 Hrs  T(C): 36.6 (07 Sep 2019 10:39), Max: 37 (06 Sep 2019 15:40)  T(F): 97.9 (07 Sep 2019 10:39), Max: 98.6 (06 Sep 2019 15:40)  HR: 69 (07 Sep 2019 10:39) (61 - 86)  BP: 150/76 (07 Sep 2019 10:39) (121/63 - 155/78)  BP(mean): --  RR: 20 (07 Sep 2019 10:39) (15 - 28)  SpO2: 96% (07 Sep 2019 10:39) (95% - 100%)        I&O's Summary    06 Sep 2019 07:01  -  07 Sep 2019 07:00  --------------------------------------------------------  IN: 300 mL / OUT: 0 mL / NET: 300 mL        Physical Exam:   GENERAL: NAD, well-groomed, well-developed  HEENT: LEW/   Atraumatic, Normocephalic  ENMT: No tonsillar erythema, exudates, or enlargement; Moist mucous membranes, Good dentition, No lesions  NECK: Supple, No JVD, Normal thyroid  CHEST/LUNG: Clear to auscultation bilaterally  CVS: Regular rate and rhythm; No murmurs, rubs, or gallops  GI: : Soft, Nontender, Nondistended; Bowel sounds present  NERVOUS SYSTEM:  Alert & Oriented X3  EXTREMITIES:  2+ Peripheral Pulses, No clubbing, cyanosis, or edema  LYMPH: No lymphadenopathy noted  SKIN: No rashes or lesions  ENDOCRINOLOGY: No Thyromegaly  PSYCH: Appropriate    Labs:  6.2<64<4>>42<<7.325>>6.2<<3><<4><<5<<429>>                            11.3   12.30 )-----------( 273      ( 07 Sep 2019 05:20 )             37.3                         11.8   12.20 )-----------( 260      ( 06 Sep 2019 06:11 )             40.2     09-07    136  |  94<L>  |  13  ----------------------------<  112<H>  4.9   |  31  |  0.86  09-06    138  |  99  |  14  ----------------------------<  116<H>  4.5   |  28  |  0.85    Ca    8.9      07 Sep 2019 05:20  Ca    9.1      06 Sep 2019 06:11  Phos  3.9     09-07  Mg     2.1     09-07  Mg     2.2     09-06    TPro  6.8  /  Alb  4.2  /  TBili  0.5  /  DBili  x   /  AST  15  /  ALT  13  /  AlkPhos  83  09-07  TPro  7.1  /  Alb  4.2  /  TBili  0.4  /  DBili  x   /  AST  11  /  ALT  11  /  AlkPhos  82  09-06    CAPILLARY BLOOD GLUCOSE      POCT Blood Glucose.: 110 mg/dL (07 Sep 2019 11:48)  POCT Blood Glucose.: 108 mg/dL (07 Sep 2019 07:40)  POCT Blood Glucose.: 112 mg/dL (06 Sep 2019 21:24)  POCT Blood Glucose.: 123 mg/dL (06 Sep 2019 16:54)    LIVER FUNCTIONS - ( 07 Sep 2019 05:20 )  Alb: 4.2 g/dL / Pro: 6.8 g/dL / ALK PHOS: 83 u/L / ALT: 13 u/L / AST: 15 u/L / GGT: x           PT/INR - ( 06 Sep 2019 06:11 )   PT: 12.0 SEC;   INR: 1.05          PTT - ( 06 Sep 2019 06:11 )  PTT:28.2 SEC    D DImer  Serum Pro-Brain Natriuretic Peptide: 84.36 pg/mL (09-06 @ 06:11)      Studies  Chest X-RAY  CT SCAN Chest   CT Abdomen  Venous Dopplers: LE:   Others      < from: CT Angio Chest w/Cont (02.09.15 @ 12:06) >    FINDINGS:    CHEST:     LUNGS AND LARGE AIRWAYS: Patent central airways. Peripheral airways are   thick walled. 3 mm left upper lobe nodule. No pulmonary mass or   consolidation.  PLEURA: No pleural effusion.  VESSELS: No pulmonary embolus. No thoracic aortic dissection.  HEART: Heart size is normal.No pericardial effusion.  MEDIASTINUMAND ASHA: No lymphadenopathy.  CHEST WALL AND LOWER NECK: Within normal limits.  VISUALIZED UPPER ABDOMEN: Within normal limits.  BONES: Within normal limits.    IMPRESSION: No pulmonary embolus. Peripheral airways are thick walled   which could be due to bronchitis or chronic asthma.       < from: Xray Chest 1 View- PORTABLE-Urgent (09.06.19 @ 06:31) >    EXAM:  XR CHEST PORTABLE URGENT 1V        PROCEDURE DATE:  Sep  6 2019         INTERPRETATION:  TIME OF EXAM: September 6, 2019 at 6:28 AM    CLINICAL INFORMATION: Chest pain    TECHNIQUE:   Portable chest    INTERPRETATION:     The lungs are clear. Coronary stent is present. Heart is difficult to   evaluate on this projection. No effusions or congestion to indicate CHF.      COMPARISON:  February 6, 2017      IMPRESSION:  Clear lungs.        < from: CT Abdomen and Pelvis w/ Oral Cont and w/ IV Cont (09.06.19 @ 11:52) >  BLADDER: Underdistended, limiting evaluation.  REPRODUCTIVE ORGANS: Prominent central low attenuation in the endometrial   canal.    BOWEL: No bowel obstruction. Appendix is within normal limits. Duodenal   diverticulum arising from the third duodenum. Scattered colonic   diverticulosis. A linear radiopaque density is seen within the fundus of   the stomach (2:25 through 29), similar in appearance to prior chest CT   dated 2/9/2015. Correlation with any prior procedure is recommended.  PERITONEUM: No ascites.  VESSELS: Atherosclerotic calcification.  RETROPERITONEUM/LYMPH NODES: No lymphadenopathy.    ABDOMINAL WALL: Within normal limits.  BONES: Degenerative changes in the spine.    IMPRESSION: Status post cholecystectomy. Mild intra and extra hepatic   biliary ductal dilatation as noted on ultrasound performed the same day.   Correlation with liver function tests is recommended.    New tree-in-bud opacities in the right lower lobe.    Prominent central low attenuation in the endometrial canal. Pelvic   ultrasound may be obtained as indicated to assess for endometrial   pathology.                      TAVIA DUONG M.D., ATTENDING RADIOLOGIST  This document has been electronically signed. Sep  6 2019 12:12PM        < end of copied text >            GEORGIE HODGE M.D., ATTENDING RADIOLOGIST  This document has been electronically signed. Sep  6 2019  7:20AM              < end of copied text >           MIGUEL GUSTAFSON M.D., ATTENDING RADIOLOGIST  This examination was interpreted on: Feb 9 2015 12:32P.  This document   has been electronically signed. Feb 9 2015 12:40P.    < end of copied text >

## 2019-09-07 NOTE — PROGRESS NOTE ADULT - ASSESSMENT
· Assessment	  85 y/o Sam speaking female with a PMHx of CAD S/P stent placement, VF S/P life vest, HTN, HLD, pre-DM, GERD, and hypothyroidism presents to ED with pleuritic right sided chest pain in the setting of right lower lobe pneumonia.     Problem/Plan - 1:  ·  Problem: Atypical chest pain.  Plan: Chest pain atypical likely in the setting of pleurisy from underlying pneumonia  Cardio eval appreciated  Recent stress test negative.     Problem/Plan - 2:  ·  Problem: CAP (community acquired pneumonia).  Plan: CT abdomen and pelvis shows new RLL tree bud opacities  IV Abx  Pul eval noted  ID eval pending     Problem/Plan - 3:  ·  Problem: CAD (coronary artery disease).  Plan: Chest pain atypical likely in the setting of pleurisy from underlying pneumonia  EKG shows NSR at 61 bpm with PACs, LAD  Delta troponin negative 42-->40  Continue ASA, Lipitor, Metoprolol, Ranexa  Recent pharm NST two weeks ago negative   Problem/Plan - 4:  ·  Problem: HTN (hypertension).  Plan: Continue Metoprolol, Ranexa  Monitor BP serially  Sodium restriction.      Problem/Plan - 5:  ·  Problem: HLD (hyperlipidemia).  Plan: Continue Atorvastatin  DASH diet.      Problem/Plan - 6:  Problem: DM (diabetes mellitus). Plan:  insulin sliding scale  Monitor finger sticks  Diabetic diet.     Problem/Plan - 7:  ·  Problem: Hypothyroidism.  Plan: Continue Levothyroxine

## 2019-09-07 NOTE — PROGRESS NOTE ADULT - SUBJECTIVE AND OBJECTIVE BOX
Patient is a 84y old  Female who presents with a chief complaint of Chest pain (07 Sep 2019 13:26)      SUBJECTIVE / OVERNIGHT EVENTS:    Events noted.  CONSTITUTIONAL: No fever,  or fatigue  RESPIRATORY: No cough, wheezing,  No shortness of breath  CARDIOVASCULAR: No chest pain, palpitations, dizziness, or leg swelling  GASTROINTESTINAL: No abdominal or epigastric pain.   NEUROLOGICAL: No headaches,     MEDICATIONS  (STANDING):  aspirin enteric coated 81 milliGRAM(s) Oral daily  atorvastatin 20 milliGRAM(s) Oral at bedtime  cefTRIAXone   IVPB 1000 milliGRAM(s) IV Intermittent every 24 hours  citalopram 20 milliGRAM(s) Oral daily  dextrose 5%. 1000 milliLiter(s) (50 mL/Hr) IV Continuous <Continuous>  dextrose 50% Injectable 12.5 Gram(s) IV Push once  dextrose 50% Injectable 25 Gram(s) IV Push once  dextrose 50% Injectable 25 Gram(s) IV Push once  enoxaparin Injectable 40 milliGRAM(s) SubCutaneous daily  furosemide    Tablet 20 milliGRAM(s) Oral daily  influenza   Vaccine 0.5 milliLiter(s) IntraMuscular once  insulin lispro (HumaLOG) corrective regimen sliding scale   SubCutaneous three times a day before meals  insulin lispro (HumaLOG) corrective regimen sliding scale   SubCutaneous at bedtime  levothyroxine 75 MICROGram(s) Oral daily  metoprolol tartrate 25 milliGRAM(s) Oral two times a day  pantoprazole    Tablet 40 milliGRAM(s) Oral before breakfast  ranolazine 500 milliGRAM(s) Oral two times a day  ticagrelor 60 milliGRAM(s) Oral two times a day    MEDICATIONS  (PRN):  acetaminophen   Tablet .. 650 milliGRAM(s) Oral every 6 hours PRN Temp greater or equal to 38C (100.4F), Mild Pain (1 - 3), Moderate Pain (4 - 6)  dextrose 40% Gel 15 Gram(s) Oral once PRN Blood Glucose LESS THAN 70 milliGRAM(s)/deciliter  glucagon  Injectable 1 milliGRAM(s) IntraMuscular once PRN Glucose LESS THAN 70 milligrams/deciliter        CAPILLARY BLOOD GLUCOSE      POCT Blood Glucose.: 146 mg/dL (07 Sep 2019 16:43)  POCT Blood Glucose.: 110 mg/dL (07 Sep 2019 11:48)  POCT Blood Glucose.: 108 mg/dL (07 Sep 2019 07:40)  POCT Blood Glucose.: 112 mg/dL (06 Sep 2019 21:24)    I&O's Summary    06 Sep 2019 07:01  -  07 Sep 2019 07:00  --------------------------------------------------------  IN: 300 mL / OUT: 0 mL / NET: 300 mL        PHYSICAL EXAM:    NECK: Supple, No JVD  CHEST/LUNG: Clear to auscultation bilaterally; bibasilar crackles  HEART: Regular rate and rhythm; No murmurs, rubs, or gallops  ABDOMEN: Soft, Nontender, Nondistended; Bowel sounds present  EXTREMITIES:   No edema  NEUROLOGY: AAO X 3      LABS:                        11.3   12.30 )-----------( 273      ( 07 Sep 2019 05:20 )             37.3     09-07    136  |  94<L>  |  13  ----------------------------<  112<H>  4.9   |  31  |  0.86    Ca    8.9      07 Sep 2019 05:20  Phos  3.9     09-07  Mg     2.1     09-07    TPro  6.8  /  Alb  4.2  /  TBili  0.5  /  DBili  x   /  AST  15  /  ALT  13  /  AlkPhos  83  09-07    PT/INR - ( 06 Sep 2019 06:11 )   PT: 12.0 SEC;   INR: 1.05          PTT - ( 06 Sep 2019 06:11 )  PTT:28.2 SEC        CAPILLARY BLOOD GLUCOSE      POCT Blood Glucose.: 146 mg/dL (07 Sep 2019 16:43)  POCT Blood Glucose.: 110 mg/dL (07 Sep 2019 11:48)  POCT Blood Glucose.: 108 mg/dL (07 Sep 2019 07:40)  POCT Blood Glucose.: 112 mg/dL (06 Sep 2019 21:24)    09-06 @ 14:58  Culture-urine --  Culture results --  method type PCR  Organism BLOOD CULTURE PCR  Organism Identification BLOOD CULTURE PCR  Specimen source BLOOD PERIPHERAL           09-06 @ 14:58  Culture blood   ***Blood Panel PCR results on this specimen are available  approximately 3 hours after the Gram stain result***  Gram stain, PCR, and/or culture results may not always  correspond due to difference in methodologies  ------------------------------------------------------------  This PCR assay was performed using [employer].  The  following targets are tested for:  Enterococcus, vancomycin  resistant enterococci, Listeria monocytogenes,  coagulase  negative staphylococci, S. aureus, methicillin resistant S.  aureus, Streptococcus agalactiae (Group B), S. pneumoniae,  S. pyogenes (Group A), Acinetobacter baumannii, Enterobacter  cloacae, E. coli, Klebsiella oxytoca, K. pneumoniae, Proteus  sp., Serratia marcescens, Haemophilus influenzae, Neisseria  meningitidis, Pseudomonas aeruginosa, Candida albicans, C.  glabrata, C. krusei, C. parapsilosis, C. tropicalis and the  KPC resistance gene.  **NOTE: Due to technical problems, Proteus sp. will NOT be  reported as part of the BCID paneluntil further notice.  Culture results --  Gram stain --  Gram stain blood   ***** CRITICAL RESULT *****  PERSON CALLED / READ-BACK:DESIREE ARNETT/MALINDA/Y  DATE / TIME CALLED: 09/07/19 1025  CALLED BY: SERJIO COCHRAN  GPCCL^Gram Pos Cocci In Clusters  AFTER: 16 HOURS INCUBATION  BOTTLE: AEROBIC BOTTLE  Method type PCR  Organism BLOOD CULTURE PCR  Organism identification BLOOD CULTURE PCR  Specimen source BLOOD PERIPHERAL      RADIOLOGY & ADDITIONAL TESTS:    Imaging Personally Reviewed:    Consultant(s) Notes Reviewed:      Care Discussed with Consultants/Other Providers:

## 2019-09-07 NOTE — CHART NOTE - NSCHARTNOTEFT_GEN_A_CORE
pt family requesting zithromax be discontinued as patient felt weak from this medication and was "not acting like herself", explained side effects of antibiotics   contacted by Dr stella gaytan to discontinue zithromax  d/w Attending and RN

## 2019-09-08 LAB
-  COAGULASE NEGATIVE STAPHYLOCOCCUS: SIGNIFICANT CHANGE UP
ANION GAP SERPL CALC-SCNC: 12 MMO/L — SIGNIFICANT CHANGE UP (ref 7–14)
ANION GAP SERPL CALC-SCNC: 14 MMO/L — SIGNIFICANT CHANGE UP (ref 7–14)
BACTERIA BLD CULT: SIGNIFICANT CHANGE UP
BUN SERPL-MCNC: 14 MG/DL — SIGNIFICANT CHANGE UP (ref 7–23)
BUN SERPL-MCNC: 14 MG/DL — SIGNIFICANT CHANGE UP (ref 7–23)
CALCIUM SERPL-MCNC: 8.9 MG/DL — SIGNIFICANT CHANGE UP (ref 8.4–10.5)
CALCIUM SERPL-MCNC: 9.1 MG/DL — SIGNIFICANT CHANGE UP (ref 8.4–10.5)
CHLORIDE SERPL-SCNC: 85 MMOL/L — LOW (ref 98–107)
CHLORIDE SERPL-SCNC: 87 MMOL/L — LOW (ref 98–107)
CO2 SERPL-SCNC: 28 MMOL/L — SIGNIFICANT CHANGE UP (ref 22–31)
CO2 SERPL-SCNC: 30 MMOL/L — SIGNIFICANT CHANGE UP (ref 22–31)
CREAT SERPL-MCNC: 0.97 MG/DL — SIGNIFICANT CHANGE UP (ref 0.5–1.3)
CREAT SERPL-MCNC: 1.09 MG/DL — SIGNIFICANT CHANGE UP (ref 0.5–1.3)
GLUCOSE BLDC GLUCOMTR-MCNC: 103 MG/DL — HIGH (ref 70–99)
GLUCOSE BLDC GLUCOMTR-MCNC: 106 MG/DL — HIGH (ref 70–99)
GLUCOSE BLDC GLUCOMTR-MCNC: 113 MG/DL — HIGH (ref 70–99)
GLUCOSE BLDC GLUCOMTR-MCNC: 133 MG/DL — HIGH (ref 70–99)
GLUCOSE BLDC GLUCOMTR-MCNC: 99 MG/DL — SIGNIFICANT CHANGE UP (ref 70–99)
GLUCOSE SERPL-MCNC: 118 MG/DL — HIGH (ref 70–99)
GLUCOSE SERPL-MCNC: 121 MG/DL — HIGH (ref 70–99)
HCT VFR BLD CALC: 42.3 % — SIGNIFICANT CHANGE UP (ref 34.5–45)
HGB BLD-MCNC: 12.5 G/DL — SIGNIFICANT CHANGE UP (ref 11.5–15.5)
MAGNESIUM SERPL-MCNC: 2.1 MG/DL — SIGNIFICANT CHANGE UP (ref 1.6–2.6)
MAGNESIUM SERPL-MCNC: 2.3 MG/DL — SIGNIFICANT CHANGE UP (ref 1.6–2.6)
MCHC RBC-ENTMCNC: 22.7 PG — LOW (ref 27–34)
MCHC RBC-ENTMCNC: 29.6 % — LOW (ref 32–36)
MCV RBC AUTO: 76.9 FL — LOW (ref 80–100)
NRBC # FLD: 0 K/UL — SIGNIFICANT CHANGE UP (ref 0–0)
ORGANISM # SPEC MICROSCOPIC CNT: SIGNIFICANT CHANGE UP
PHOSPHATE SERPL-MCNC: 3.9 MG/DL — SIGNIFICANT CHANGE UP (ref 2.5–4.5)
PHOSPHATE SERPL-MCNC: 4 MG/DL — SIGNIFICANT CHANGE UP (ref 2.5–4.5)
PLATELET # BLD AUTO: 301 K/UL — SIGNIFICANT CHANGE UP (ref 150–400)
PMV BLD: 10.8 FL — SIGNIFICANT CHANGE UP (ref 7–13)
POTASSIUM SERPL-MCNC: 4.3 MMOL/L — SIGNIFICANT CHANGE UP (ref 3.5–5.3)
POTASSIUM SERPL-MCNC: 4.8 MMOL/L — SIGNIFICANT CHANGE UP (ref 3.5–5.3)
POTASSIUM SERPL-SCNC: 4.3 MMOL/L — SIGNIFICANT CHANGE UP (ref 3.5–5.3)
POTASSIUM SERPL-SCNC: 4.8 MMOL/L — SIGNIFICANT CHANGE UP (ref 3.5–5.3)
RBC # BLD: 5.5 M/UL — HIGH (ref 3.8–5.2)
RBC # FLD: 17.4 % — HIGH (ref 10.3–14.5)
SODIUM SERPL-SCNC: 127 MMOL/L — LOW (ref 135–145)
SODIUM SERPL-SCNC: 129 MMOL/L — LOW (ref 135–145)
WBC # BLD: 14.25 K/UL — HIGH (ref 3.8–10.5)
WBC # FLD AUTO: 14.25 K/UL — HIGH (ref 3.8–10.5)

## 2019-09-08 PROCEDURE — 71275 CT ANGIOGRAPHY CHEST: CPT | Mod: 26

## 2019-09-08 PROCEDURE — 99223 1ST HOSP IP/OBS HIGH 75: CPT

## 2019-09-08 RX ORDER — ACETAMINOPHEN 500 MG
1000 TABLET ORAL ONCE
Refills: 0 | Status: COMPLETED | OUTPATIENT
Start: 2019-09-08 | End: 2019-09-08

## 2019-09-08 RX ADMIN — TICAGRELOR 60 MILLIGRAM(S): 90 TABLET ORAL at 22:42

## 2019-09-08 RX ADMIN — ENOXAPARIN SODIUM 40 MILLIGRAM(S): 100 INJECTION SUBCUTANEOUS at 17:10

## 2019-09-08 RX ADMIN — Medication 400 MILLIGRAM(S): at 09:18

## 2019-09-08 RX ADMIN — Medication 75 MICROGRAM(S): at 06:41

## 2019-09-08 RX ADMIN — Medication 25 MILLIGRAM(S): at 06:41

## 2019-09-08 RX ADMIN — Medication 81 MILLIGRAM(S): at 11:45

## 2019-09-08 RX ADMIN — ATORVASTATIN CALCIUM 20 MILLIGRAM(S): 80 TABLET, FILM COATED ORAL at 22:42

## 2019-09-08 RX ADMIN — CITALOPRAM 20 MILLIGRAM(S): 10 TABLET, FILM COATED ORAL at 11:45

## 2019-09-08 RX ADMIN — TICAGRELOR 60 MILLIGRAM(S): 90 TABLET ORAL at 09:17

## 2019-09-08 RX ADMIN — CEFTRIAXONE 100 MILLIGRAM(S): 500 INJECTION, POWDER, FOR SOLUTION INTRAMUSCULAR; INTRAVENOUS at 14:25

## 2019-09-08 RX ADMIN — Medication 1000 MILLIGRAM(S): at 09:45

## 2019-09-08 RX ADMIN — Medication 25 MILLIGRAM(S): at 17:10

## 2019-09-08 RX ADMIN — RANOLAZINE 500 MILLIGRAM(S): 500 TABLET, FILM COATED, EXTENDED RELEASE ORAL at 17:10

## 2019-09-08 RX ADMIN — PANTOPRAZOLE SODIUM 40 MILLIGRAM(S): 20 TABLET, DELAYED RELEASE ORAL at 06:42

## 2019-09-08 NOTE — PROGRESS NOTE ADULT - SUBJECTIVE AND OBJECTIVE BOX
Patient is a 84y old  Female who presents with a chief complaint of Chest pain (07 Sep 2019 15:40)      Any change in ROS: feels uncomfortable at times: tablert +    MEDICATIONS  (STANDING):  aspirin enteric coated 81 milliGRAM(s) Oral daily  atorvastatin 20 milliGRAM(s) Oral at bedtime  cefTRIAXone   IVPB 1000 milliGRAM(s) IV Intermittent every 24 hours  citalopram 20 milliGRAM(s) Oral daily  dextrose 5%. 1000 milliLiter(s) (50 mL/Hr) IV Continuous <Continuous>  dextrose 50% Injectable 12.5 Gram(s) IV Push once  dextrose 50% Injectable 25 Gram(s) IV Push once  dextrose 50% Injectable 25 Gram(s) IV Push once  enoxaparin Injectable 40 milliGRAM(s) SubCutaneous daily  furosemide    Tablet 20 milliGRAM(s) Oral daily  influenza   Vaccine 0.5 milliLiter(s) IntraMuscular once  insulin lispro (HumaLOG) corrective regimen sliding scale   SubCutaneous three times a day before meals  insulin lispro (HumaLOG) corrective regimen sliding scale   SubCutaneous at bedtime  levothyroxine 75 MICROGram(s) Oral daily  metoprolol tartrate 25 milliGRAM(s) Oral two times a day  pantoprazole    Tablet 40 milliGRAM(s) Oral before breakfast  ranolazine 500 milliGRAM(s) Oral two times a day  ticagrelor 60 milliGRAM(s) Oral two times a day    MEDICATIONS  (PRN):  acetaminophen   Tablet .. 650 milliGRAM(s) Oral every 6 hours PRN Temp greater or equal to 38C (100.4F), Mild Pain (1 - 3), Moderate Pain (4 - 6)  dextrose 40% Gel 15 Gram(s) Oral once PRN Blood Glucose LESS THAN 70 milliGRAM(s)/deciliter  glucagon  Injectable 1 milliGRAM(s) IntraMuscular once PRN Glucose LESS THAN 70 milligrams/deciliter    Vital Signs Last 24 Hrs  T(C): 36.6 (08 Sep 2019 09:13), Max: 37 (08 Sep 2019 04:01)  T(F): 97.8 (08 Sep 2019 09:13), Max: 98.6 (08 Sep 2019 04:01)  HR: 66 (08 Sep 2019 09:13) (65 - 74)  BP: 123/78 (08 Sep 2019 09:13) (123/78 - 157/84)  BP(mean): --  RR: 17 (08 Sep 2019 09:13) (17 - 18)  SpO2: 97% (08 Sep 2019 09:13) (96% - 100%)    I&O's Summary        Physical Exam:   GENERAL: NAD, well-groomed, well-developed  HEENT: LEW/   Atraumatic, Normocephalic  ENMT: No tonsillar erythema, exudates, or enlargement; Moist mucous membranes, Good dentition, No lesions  NECK: Supple, No JVD, Normal thyroid  CHEST/LUNG: Clear to auscultaion, ; No rales, rhonchi, wheezing, or rubs  CVS: Regular rate and rhythm; No murmurs, rubs, or gallops  GI: : Soft, Nontender, Nondistended; Bowel sounds present  NERVOUS SYSTEM:  Alert & Oriented X3  EXTREMITIES:  2+ Peripheral Pulses, No clubbing, cyanosis, or edema  LYMPH: No lymphadenopathy noted  SKIN: No rashes or lesions  ENDOCRINOLOGY: No Thyromegaly  PSYCH: Appropriate    Labs:  28                            12.5   14.25 )-----------( 301      ( 08 Sep 2019 09:29 )             42.3                         11.3   12.30 )-----------( 273      ( 07 Sep 2019 05:20 )             37.3                         11.8   12.20 )-----------( 260      ( 06 Sep 2019 06:11 )             40.2     09-08    129<L>  |  85<L>  |  14  ----------------------------<  121<H>  4.8   |  30  |  0.97  09-07    136  |  94<L>  |  13  ----------------------------<  112<H>  4.9   |  31  |  0.86  09-06    138  |  99  |  14  ----------------------------<  116<H>  4.5   |  28  |  0.85    Ca    9.1      08 Sep 2019 09:29  Ca    8.9      07 Sep 2019 05:20  Phos  4.0     09-08  Phos  3.9     09-07  Mg     2.3     09-08  Mg     2.1     09-07    TPro  6.8  /  Alb  4.2  /  TBili  0.5  /  DBili  x   /  AST  15  /  ALT  13  /  AlkPhos  83  09-07  TPro  7.1  /  Alb  4.2  /  TBili  0.4  /  DBili  x   /  AST  11  /  ALT  11  /  AlkPhos  82  09-06    CAPILLARY BLOOD GLUCOSE      POCT Blood Glucose.: 133 mg/dL (08 Sep 2019 11:42)  POCT Blood Glucose.: 99 mg/dL (08 Sep 2019 07:33)  POCT Blood Glucose.: 113 mg/dL (08 Sep 2019 04:04)  POCT Blood Glucose.: 126 mg/dL (07 Sep 2019 21:51)  POCT Blood Glucose.: 146 mg/dL (07 Sep 2019 16:43)      LIVER FUNCTIONS - ( 07 Sep 2019 05:20 )  Alb: 4.2 g/dL / Pro: 6.8 g/dL / ALK PHOS: 83 u/L / ALT: 13 u/L / AST: 15 u/L / GGT: x               Serum Pro-Brain Natriuretic Peptide: 84.36 pg/mL (09-06 @ 06:11)        RECENT CULTURES:  09-06 @ 14:58 BLOOD PERIPHERAL   PCR      ***** CRITICAL RESULT *****  PERSON CALLED / READ-BACK:DESIREE ARNETT/MALINDA/Y  DATE / TIME CALLED: 09/07/19 1025  CALLED BY: SERJIO COCHRAN  GPCCL^Gram Pos Cocci In Clusters  AFTER: 16 HOURS INCUBATION  BOTTLE: AEROBIC BOTTLE  BLOOD CULTURE PCR  Staphylococcus sp.,coag neg  BLOOD CULTURE PCR  ***Blood Panel PCR results on this specimen are available  approximately 3 hours after the Gram stain result***  Gram stain, PCR, and/or culture results may not always  correspond due to difference in methodologies  ------------------------------------------------------------  This PCR assay was performed using [employer].  The  following targets are tested for:  Enterococcus, vancomycin  resistant enterococci, Listeria monocytogenes,  coagulase  negative staphylococci, S. aureus, methicillin resistant S.  aureus, Streptococcus agalactiae (Group B), S. pneumoniae,  S. pyogenes (Group A), Acinetobacter baumannii, Enterobacter  cloacae, E. coli, Klebsiella oxytoca, K. pneumoniae, Proteus  sp., Serratia marcescens, Haemophilus influenzae, Neisseria  meningitidis, Pseudomonas aeruginosa, Candida albicans, C.  glabrata, C. krusei, C. parapsilosis, C. tropicalis and the  KPC resistance gene.  **NOTE: Due to technical problems, Proteus sp. will NOT be  reported as part of the BCID panel until further notice.       ***Blood Panel PCR results on this specimen are available  approximately 3 hours after the Gram stain result***  Gram stain, PCR, and/or culture results may not always  correspond due to difference in methodologies  ------------------------------------------------------------  This PCR assay was performed using [employer].  The  following targets are tested for:  Enterococcus, vancomycin  resistant enterococci, Listeria monocytogenes,  coagulase  negative staphylococci, S. aureus, methicillin resistant S.  aureus, Streptococcus agalactiae (Group B), S. pneumoniae,  S. pyogenes (Group A), Acinetobacter baumannii, Enterobacter  cloacae, E. coli, Klebsiella oxytoca, K. pneumoniae, Proteus  sp., Serratia marcescens, Haemophilus influenzae, Neisseria  meningitidis, Pseudomonas aeruginosa, Candida albicans, C.  glabrata, C. krusei, C. parapsilosis, C. tropicalis and the  KPC resistance gene.  **NOTE: Due to technical problems, Proteus sp. will NOT be  reported as part of the BCID paneluntil further notice.        RESPIRATORY CULTURES:    < from: CT Angio Chest w/ IV Cont (09.08.19 @ 10:10) >  PPER ABDOMEN: Cholecystectomy.    BONES AND SOFT TISSUES: No aggressive osseous lesion.    LOWER NECK: Within normal limits.      IMPRESSION:     1.  No pulmonary embolus.    2.  Cardiomegaly.    < end of copied text >        Studies  Chest X-RAY  CT SCAN Chest   Venous Dopplers: LE:   CT Abdomen  Others

## 2019-09-08 NOTE — PROGRESS NOTE ADULT - ASSESSMENT
83 y/o Sam speaking female with a PMHx of CAD S/P stent placement, VF S/P life vest, HTN, HLD, pre-DM, GERD, and hypothyroidism presents to ED with pleuritic right sided chest pain in the setting of right lower lobe pneumonia.

## 2019-09-08 NOTE — PROGRESS NOTE ADULT - PROBLEM SELECTOR PLAN 2
no pneumonia or PE : has pulm nodule which has been stable: > reason for fever: has contaminated positive blood clutres: pts family wants IDc onsult

## 2019-09-08 NOTE — CONSULT NOTE ADULT - ASSESSMENT
ASSESSMENT:    RECOMMENDATIONS:    ISAURA Beatty, MD  Fellow, Infectious Diseases  Pager: [phone]  After 5pm and on Weekends: Call [phone] ASSESSMENT:  84/F Sam and Bret speaking with [facility] CAD S/P stents 2016, VF S/P life vest, HTN, HLD, pre-DM, GERD, and hypothyroidism presents to ED with chest pain and SOB for the past couple weeks. CT A/P concerning new RLL tree-in-bud opacities.  - patient and family report intermittent episodes of anxiety, chest discomfort, palpitations. Associated with subjective fevers, palpitations that resolve with medications (names unknown). Also report decreased appetite and poor oral intake  - CT Chest showed small nodules in RLL. GPC in blood Cx found to be coagulase-negative Staph, a possible contaminant. ID consulted for Abx recs  ------------  At this time, there does not seem to be definite signs of pneumonia or ongoing active infection  - leucocytosis noted, but of unclear etiology  - blood Cx showed Coagulase-negative Staph, a likely contaminant  - patient denied fevers, cough, URI symptoms, sick contacts. Afebrile while admitted  - Urine Legionella negative    RECOMMENDATIONS:  - recommend discontinuing Ceftriaxone and monitoring off antibiotics for now  - recommend checking CBC with differential tomorrow  - of note, daughter reports that patient is not taking Brillinta for past two years. Patient is currently receiving Brillinta in the hospital. Unclear if medication side effects (Ticagrelor associated with respiratory distress) could be contributing to respiratory issues, would defer to Cardiology service regarding antiplatelet medications  - unclear etiology of persistent hyponatremia and hyperchloremia (could be 2/2 poor PO intake). Workup per primary team  - unclear etiology of lung nodules, would defer to Pulmonary service  Recs conveyed to primary team    ISAURA Beatty, MD  Fellow, Infectious Diseases  Pager: [phone]  After 5pm and on Weekends: Call [phone]

## 2019-09-08 NOTE — PROGRESS NOTE ADULT - SUBJECTIVE AND OBJECTIVE BOX
Patient is a 84y old  Female who presents with a chief complaint of Chest pain (08 Sep 2019 16:23)      SUBJECTIVE / OVERNIGHT EVENTS:    Events noted.  CONSTITUTIONAL: Uneasiness  RESPIRATORY: No cough, wheezing,  No shortness of breath  CARDIOVASCULAR: No chest pain, palpitations, dizziness, or leg swelling  GASTROINTESTINAL: No abdominal or epigastric pain. No nausea, vomiting.  NEUROLOGICAL: No headaches,     MEDICATIONS  (STANDING):  aspirin enteric coated 81 milliGRAM(s) Oral daily  atorvastatin 20 milliGRAM(s) Oral at bedtime  citalopram 20 milliGRAM(s) Oral daily  dextrose 5%. 1000 milliLiter(s) (50 mL/Hr) IV Continuous <Continuous>  dextrose 50% Injectable 12.5 Gram(s) IV Push once  dextrose 50% Injectable 25 Gram(s) IV Push once  dextrose 50% Injectable 25 Gram(s) IV Push once  enoxaparin Injectable 40 milliGRAM(s) SubCutaneous daily  furosemide    Tablet 20 milliGRAM(s) Oral daily  influenza   Vaccine 0.5 milliLiter(s) IntraMuscular once  insulin lispro (HumaLOG) corrective regimen sliding scale   SubCutaneous three times a day before meals  insulin lispro (HumaLOG) corrective regimen sliding scale   SubCutaneous at bedtime  levothyroxine 75 MICROGram(s) Oral daily  metoprolol tartrate 25 milliGRAM(s) Oral two times a day  pantoprazole    Tablet 40 milliGRAM(s) Oral before breakfast  ranolazine 500 milliGRAM(s) Oral two times a day  ticagrelor 60 milliGRAM(s) Oral two times a day    MEDICATIONS  (PRN):  acetaminophen   Tablet .. 650 milliGRAM(s) Oral every 6 hours PRN Temp greater or equal to 38C (100.4F), Mild Pain (1 - 3), Moderate Pain (4 - 6)  aluminum hydroxide/magnesium hydroxide/simethicone Suspension 30 milliLiter(s) Oral every 6 hours PRN Dyspepsia  dextrose 40% Gel 15 Gram(s) Oral once PRN Blood Glucose LESS THAN 70 milliGRAM(s)/deciliter  glucagon  Injectable 1 milliGRAM(s) IntraMuscular once PRN Glucose LESS THAN 70 milligrams/deciliter        CAPILLARY BLOOD GLUCOSE      POCT Blood Glucose.: 103 mg/dL (08 Sep 2019 16:46)  POCT Blood Glucose.: 133 mg/dL (08 Sep 2019 11:42)  POCT Blood Glucose.: 99 mg/dL (08 Sep 2019 07:33)  POCT Blood Glucose.: 113 mg/dL (08 Sep 2019 04:04)    I&O's Summary      PHYSICAL EXAM:  GENERAL: NAD  NECK: Supple, No JVD  CHEST/LUNG: Clear to auscultation bilaterally; No wheezing.  HEART: Regular rate and rhythm; No murmurs, rubs, or gallops  ABDOMEN: Soft, Nontender, Nondistended; Bowel sounds present  EXTREMITIES:   No edema  NEUROLOGY: AAO X 3      LABS:                        12.5   14.25 )-----------( 301      ( 08 Sep 2019 09:29 )             42.3     09-08    127<L>  |  87<L>  |  14  ----------------------------<  118<H>  4.3   |  28  |  1.09    Ca    8.9      08 Sep 2019 14:43  Phos  3.9     09-08  Mg     2.1     09-08    TPro  6.8  /  Alb  4.2  /  TBili  0.5  /  DBili  x   /  AST  15  /  ALT  13  /  AlkPhos  83  09-07            CAPILLARY BLOOD GLUCOSE      POCT Blood Glucose.: 103 mg/dL (08 Sep 2019 16:46)  POCT Blood Glucose.: 133 mg/dL (08 Sep 2019 11:42)  POCT Blood Glucose.: 99 mg/dL (08 Sep 2019 07:33)  POCT Blood Glucose.: 113 mg/dL (08 Sep 2019 04:04)    09-06 @ 14:58  Culture-urine --  Culture results --  method type PCR  Organism BLOOD CULTURE PCR  ***Blood Panel PCR results on this specimen are available  approximately 3 hours after the Gram stain result***  Gram stain, PCR, and/or culture results may not always  correspond due to difference in methodologies  ------------------------------------------------------------  This PCR assay was performed using [email or web address].  The  following targets are tested for:  Enterococcus, vancomycin  resistant enterococci, Listeria monocytogenes,  coagulase  negative staphylococci, S. aureus, methicillin resistant S.  aureus, Streptococcus agalactiae (Group B), S. pneumoniae,  S. pyogenes (Group A), Acinetobacter baumannii, Enterobacter  cloacae, E. coli, Klebsiella oxytoca, K. pneumoniae, Proteus  sp., Serratia marcescens, Haemophilus influenzae, Neisseria  meningitidis, Pseudomonas aeruginosa, Candida albicans, C.  glabrata, C. krusei, C. parapsilosis, C. tropicalis and the  KPC resistance gene.  **NOTE: Due to technical problems, Proteus sp. will NOT be  reported as part of the BCID panel until further notice.  Organism Identification BLOOD CULTURE PCR  Staphylococcus sp.,coag neg  Specimen source BLOOD PERIPHERAL           09-06 @ 14:58  Culture blood   ***Blood Panel PCR results on this specimen are available  approximately 3 hours after the Gram stain result***  Gram stain, PCR, and/or culture results may not always  correspond due to difference in methodologies  ------------------------------------------------------------  This PCR assay was performed using [email or web address].  The  following targets are tested for:  Enterococcus, vancomycin  resistant enterococci, Listeria monocytogenes,  coagulase  negative staphylococci, S. aureus, methicillin resistant S.  aureus, Streptococcus agalactiae (Group B), S. pneumoniae,  S. pyogenes (Group A), Acinetobacter baumannii, Enterobacter  cloacae, E. coli, Klebsiella oxytoca, K. pneumoniae, Proteus  sp., Serratia marcescens, Haemophilus influenzae, Neisseria  meningitidis, Pseudomonas aeruginosa, Candida albicans, C.  glabrata, C. krusei, C. parapsilosis, C. tropicalis and the  KPC resistance gene.  **NOTE: Due to technical problems, Proteus sp. will NOT be  reported as part of the BCID paneluntil further notice.  Culture results --  Gram stain --  Gram stain blood   ***** CRITICAL RESULT *****  PERSON CALLED / READ-BACK:DESIREE ARNETT/MALINDA/REGI  DATE / TIME CALLED: 09/07/19 1025  CALLED BY: SERJIO COCHRAN  GPCCL^Gram Pos Cocci In Clusters  AFTER: 16 HOURS INCUBATION  BOTTLE: AEROBIC BOTTLE  Method type PCR  Organism BLOOD CULTURE PCR  ***Blood Panel PCR results on this specimen are available  approximately 3 hours after the Gram stain result***  Gram stain, PCR, and/or culture results may not always  correspond due to difference in methodologies  ------------------------------------------------------------  This PCR assay was performed using [email or web address].  The  following targets are tested for:  Enterococcus, vancomycin  resistant enterococci, Listeria monocytogenes,  coagulase  negative staphylococci, S. aureus, methicillin resistant S.  aureus, Streptococcus agalactiae (Group B), S. pneumoniae,  S. pyogenes (Group A), Acinetobacter baumannii, Enterobacter  cloacae, E. coli, Klebsiella oxytoca, K. pneumoniae, Proteus  sp., Serratia marcescens, Haemophilus influenzae, Neisseria  meningitidis, Pseudomonas aeruginosa, Candida albicans, C.  glabrata, C. krusei, C. parapsilosis, C. tropicalis and the  KPC resistance gene.  **NOTE: Due to technical problems, Proteus sp. will NOT be  reported as part of the BCID panel until further notice.  Organism identification BLOOD CULTURE PCR  Staphylococcus sp.,coag neg  Specimen source BLOOD PERIPHERAL      RADIOLOGY & ADDITIONAL TESTS:    Imaging Personally Reviewed:    Consultant(s) Notes Reviewed:      Care Discussed with Consultants/Other Providers:

## 2019-09-08 NOTE — CONSULT NOTE ADULT - ATTENDING COMMENTS
84F with significant cardiac history, presented for recurrent episodes of weakness, anxiety, palpitations.   ID asked to comment on a cluster of small nodules in the RLL.   There is no clinical or radiographic pneumonia. She's nontoxic, afebrile and is not even coughing.   Unlikely that these nodules are related to her episodic constellation of symptoms that have been ongoing for years.   CoNS on blood cultures are likely procurement contaminant.  Leukocytosis of unclear significance    Stop antibiotics   Trend WBC and add a differential  Why is she hyponatremic? Urine and serum osmolality etc.     Donald Hodges MD   [facility]   Pager [phone]

## 2019-09-08 NOTE — PROGRESS NOTE ADULT - ASSESSMENT
· Assessment	  85 y/o Sam speaking female with a PMHx of CAD S/P stent placement, VF S/P life vest, HTN, HLD, pre-DM, GERD, and hypothyroidism presents to ED with pleuritic right sided chest pain in the setting of right lower lobe pneumonia.     Problem/Plan - 1:  ·  Problem: Atypical chest pain.  Plan: Chest pain atypical likely in the setting of pleurisy from underlying pneumonia  Cardio f/up  Recent stress test negative.     Problem/Plan - 2:  ·  Problem: CAP (community acquired pneumonia).  Plan: CT abdomen and pelvis shows new RLL tree bud opacities  DC IV Abx  Pul f/up noted  ID eval appreciated     Problem/Plan - 3:  ·  Problem: CAD (coronary artery disease).  Plan: Chest pain atypical likely in the setting of pleurisy from underlying pneumonia   cardio f/up   Problem/Plan - 5:  ·  Problem: HLD (hyperlipidemia).  Plan: Continue Atorvastatin  DASH diet.      Problem/Plan - 6:  Problem: DM (diabetes mellitus). Plan:  insulin sliding scale  Monitor finger sticks  Diabetic diet.     Problem/Plan - 7:  ·  Problem: Hypothyroidism.  Plan: Continue Levothyroxine

## 2019-09-08 NOTE — CONSULT NOTE ADULT - SUBJECTIVE AND OBJECTIVE BOX
Patient is a 84y old  Female who presents with a chief complaint of Chest pain (08 Sep 2019 12:58)    HPI:  83 y/o Sam speaking female with a PMHx of CAD S/P stent placement, VF S/P life vest, HTN, HLD, pre-DM, GERD, and hypothyroidism presents to ED with chest pain for the past couple weeks. Pt reports that she has been experiencing intermittent, 10/10, non-exertional, non-dietary, pleuritic, reproducible, substernal chest pain with radiation to the right lower chest and throat. Pt states that her chest pain is made worse when she takes a deep breath or when she presses on her chest but she does not have any exertional or positional component. Pt also admits to shortness of breath, which is made worse on exertion, and associated with nausea, chills, fatigue and diffuse weakness. Pt has been to an outside hospital ER with no findings. Pt saw her cardiologist, Dr. Manpreet Dotson, and underwent nuclear stress test two weeks ago, which was reportedly normal. Pt denies fever, recent travel, sick contacts, headache, dizziness, visual deficits, orthopnea, palpitations, abdominal pain, V/D/C, hematochezia, melena, dysuria, hematuria, LOC, syncope, peripheral edema. Upon arrival to ED, EKG revealed NSR at 61 bpm with PACs, LAD. CE x2: Trop 42-->40. CXR: Clear lungs. US abdomen: Status post cholecystectomy. Otherwise unremarkable right upper quadrant sonogram. CT abdomen and pelvis: Status post cholecystectomy. Mild intra and extra hepatic biliary ductal dilatation as noted on ultrasound performed the same day. Correlation with liver function tests is recommended. New tree-in-bud opacities in the right lower lobe. Prominent central low attenuation in the endometrial canal. Pelvic ultrasound may be obtained as indicated to assess for endometrial pathology. Pt is now admitted to telemetry. (06 Sep 2019 14:47)     prior hospital charts reviewed [  ]  primary team notes reviewed [  ]  other consultant notes reviewed [  ]  PAST MEDICAL & SURGICAL HISTORY:  HLD (hyperlipidemia)  HTN (hypertension)  Hypothyroidism  Esophageal tear: S/P clipping  DM (diabetes mellitus)  CAD (coronary artery disease): S/P stent placement  Asthma  S/P cholecystectomy  S/P coronary artery stent placement  S/P cataract extraction    Allergies  No Known Allergies    ANTIMICROBIALS (past 90 days)  MEDICATIONS  (STANDING):  azithromycin  IVPB   250 mL/Hr IV Intermittent (09-06-19 @ 17:44)    cefTRIAXone   IVPB   100 mL/Hr IV Intermittent (09-08-19 @ 14:25)   100 mL/Hr IV Intermittent (09-07-19 @ 14:25)    cefTRIAXone   IVPB   100 mL/Hr IV Intermittent (09-06-19 @ 13:47)      ANTIMICROBIALS:    cefTRIAXone   IVPB 1000 every 24 hours    OTHER MEDS: MEDICATIONS  (STANDING):  acetaminophen   Tablet .. 650 every 6 hours PRN  aspirin enteric coated 81 daily  atorvastatin 20 at bedtime  citalopram 20 daily  dextrose 40% Gel 15 once PRN  dextrose 50% Injectable 12.5 once  dextrose 50% Injectable 25 once  dextrose 50% Injectable 25 once  enoxaparin Injectable 40 daily  furosemide    Tablet 20 daily  glucagon  Injectable 1 once PRN  influenza   Vaccine 0.5 once  insulin lispro (HumaLOG) corrective regimen sliding scale  three times a day before meals  insulin lispro (HumaLOG) corrective regimen sliding scale  at bedtime  levothyroxine 75 daily  metoprolol tartrate 25 two times a day  pantoprazole    Tablet 40 before breakfast  ranolazine 500 two times a day  ticagrelor 60 two times a day    SOCIAL HISTORY:   hx smoking  non-smoker    FAMILY HISTORY:  No pertinent family history in first degree relatives    REVIEW OF SYSTEMS  [  ] ROS unobtainable because:    [  ] All other systems negative except as noted below:	    Constitutional:  [ ] fever [ ] chills  [ ] weight loss  [ ] weakness  Skin:  [ ] rash [ ] phlebitis	  Eyes: [ ] icterus [ ] pain  [ ] discharge	  ENMT: [ ] sore throat  [ ] thrush [ ] ulcers [ ] exudates  Respiratory: [ ] dyspnea [ ] hemoptysis [ ] cough [ ] sputum	  Cardiovascular:  [ ] chest pain [ ] palpitations [ ] edema	  Gastrointestinal:  [ ] nausea [ ] vomiting [ ] diarrhea [ ] constipation [ ] pain	  Genitourinary:  [ ] dysuria [ ] frequency [ ] hematuria [ ] discharge [ ] flank pain  [ ] incontinence  Musculoskeletal:  [ ] myalgias [ ] arthralgias [ ] arthritis  [ ] back pain  Neurological:  [ ] headache [ ] seizures  [ ] confusion/altered mental status  Psychiatric:  [ ] anxiety [ ] depression	  Hematology/Lymphatics:  [ ] lymphadenopathy  Endocrine:  [ ] adrenal [ ] thyroid  Allergic/Immunologic:	 [ ] transplant [ ] seasonal    Vital Signs Last 24 Hrs  T(F): 98.2 (09-08-19 @ 13:53), Max: 98.6 (09-06-19 @ 15:40)  Vital Signs Last 24 Hrs  HR: 60 (09-08-19 @ 13:53) (60 - 74)  BP: 151/60 (09-08-19 @ 13:53) (123/78 - 157/84)  RR: 18 (09-08-19 @ 13:53)  SpO2: 98% (09-08-19 @ 13:53) (96% - 98%)  Wt(kg): --    EXAM:  Constitutional: Not in acute distress  Eyes: pupils bilaterally reactive to light. No icterus.  Oral cavity: Clear, no lesions  Neck: No neck vein distension noted  RS: Chest clear to auscultation bilaterally. No wheeze/rhonchi/crepitations.  CVS: S1, S2 heard. Regular rate and rhythm. No murmurs/rubs/gallops.  Abdomen: Soft. No guarding/rigidity/tenderness.  : No acute abnormalities  Extremities: Warm. No pedal edema  Skin: No lesions noted  Vascular: No evidence of phlebitis  Neuro: Alert, oriented to time/place/person  Cranial nerves 2-12 grossly normal. No focal abnormalities                          12.5   14.25 )-----------( 301      ( 08 Sep 2019 09:29 )             42.3     09-08    127<L>  |  87<L>  |  14  ----------------------------<  118<H>  4.3   |  28  |  1.09    Ca    8.9      08 Sep 2019 14:43  Phos  3.9     09-08  Mg     2.1     09-08    TPro  6.8  /  Alb  4.2  /  TBili  0.5  /  DBili  x   /  AST  15  /  ALT  13  /  AlkPhos  83  09-07    MICROBIOLOGY:  Culture - Blood (collected 06 Sep 2019 14:58)  Source: BLOOD VENOUS  Preliminary Report (08 Sep 2019 14:58):    NO ORGANISMS ISOLATED    NO ORGANISMS ISOLATED AT 48 HRS.    Culture - Blood (collected 06 Sep 2019 14:58)  Source: BLOOD PERIPHERAL  Preliminary Report (07 Sep 2019 11:59):    ***Blood Panel PCR results on this specimen are available    approximately 3 hours after the Gram stain result***    Gram stain, PCR, and/or culture results may not always    correspond due to difference in methodologies    ------------------------------------------------------------    This PCR assay was performed using [employer].  The    following targets are tested for:  Enterococcus, vancomycin    resistant enterococci, Listeria monocytogenes,  coagulase    negative staphylococci, S. aureus, methicillin resistant S.    aureus, Streptococcus agalactiae (Group B), S. pneumoniae,    S. pyogenes (Group A), Acinetobacter baumannii, Enterobacter    cloacae, E. coli, Klebsiella oxytoca, K. pneumoniae, Proteus    sp., Serratia marcescens, Haemophilus influenzae, Neisseria    meningitidis, Pseudomonas aeruginosa, Candida albicans, C.    glabrata, C. krusei, C. parapsilosis, C. tropicalis and the    KPC resistance gene.    **NOTE: Due to technical problems, Proteus sp. will NOT be    reported as part of the BCID paneluntil further notice.  Final Report (08 Sep 2019 11:50):    Single set isolate, possible contaminant.    Contact microbiology if susceptibility testing is clinically    indicated.  Organism: BLOOD CULTURE PCR  Staphylococcus sp.,coag neg (08 Sep 2019 09:22)  Organism: Staphylococcus sp.,coag neg (08 Sep 2019 09:22)  Organism: BLOOD CULTURE PCR  ***Blood Panel PCR results on this specimen are available  approximately 3 hours after the Gram stain result***  Gram stain, PCR, and/or culture results may not always  correspond due to difference in methodologies  ------------------------------------------------------------  This PCR assay was performed using [employer].  The  following targets are tested for:  Enterococcus, vancomycin  resistant enterococci, Listeria monocytogenes,  coagulase  negative staphylococci, S. aureus, methicillin resistant S.  aureus, Streptococcus agalactiae (Group B), S. pneumoniae,  S. pyogenes (Group A), Acinetobacter baumannii, Enterobacter  cloacae, E. coli, Klebsiella oxytoca, K. pneumoniae, Proteus  sp., Serratia marcescens, Haemophilus influenzae, Neisseria  meningitidis, Pseudomonas aeruginosa, Candida albicans, C.  glabrata, C. krusei, C. parapsilosis, C. tropicalis and the  KPC resistance gene.  **NOTE: Due to technical problems, Proteus sp. will NOT be  reported as part of the BCID panel until further notice. (08 Sep 2019 09:22)      -  Coagulase negative Staphylococcus: + DETECT JAZ      Method Type: PCR                    RADIOLOGY:  imaging below personally reviewed    OTHER TESTS: #Patient and Family speak Sam and Bret, ID Fellow fluent in Bret,  refused  =======================================================  Patient is a 84y old  Female who presents with a chief complaint of Chest pain (08 Sep 2019 12:58)    HPI:  83 y/o Sam speaking female with a PMHx of CAD S/P stent placement, VF S/P life vest, HTN, HLD, pre-DM, GERD, and hypothyroidism presents to ED with chest pain for the past couple weeks. Pt reports that she has been experiencing intermittent, 10/10, non-exertional, non-dietary, pleuritic, reproducible, substernal chest pain with radiation to the right lower chest and throat. Pt states that her chest pain is made worse when she takes a deep breath or when she presses on her chest but she does not have any exertional or positional component. Pt also admits to shortness of breath, which is made worse on exertion, and associated with nausea, chills, fatigue and diffuse weakness. Pt has been to an outside hospital ER with no findings. Pt saw her cardiologist, Dr. Manpreet Dotson, and underwent nuclear stress test two weeks ago, which was reportedly normal. Upon arrival to ED, EKG revealed NSR at 61 bpm with PACs, LAD. CE x2: Trop 42-->40. CXR: Clear lungs. US abdomen: Status post cholecystectomy. Otherwise unremarkable right upper quadrant sonogram. CT abdomen and pelvis: Status post cholecystectomy. Mild intra and extra hepatic biliary ductal dilatation as noted on ultrasound performed the same day. Correlation with liver function tests is recommended. New tree-in-bud opacities in the right lower lobe. Prominent central low attenuation in the endometrial canal. Pelvic ultrasound may be obtained as indicated to assess for endometrial pathology. Pt is now admitted to telemetry. (06 Sep 2019 14:47)  ---------------  - CT A/P concerning new RLL tree-in-bud opacities. CT Chest showed small nodules in RLL  - GPC in blood Cx found to be coagulase-negative Staph, a possible contaminant  - ID consulted for Abx recs  ------------  - Above HPI reviewed and reconciled with patient and family at bedside, as well as daughter over the phone   - patient and family report intermittent episodes of anxiety, chest discomfort, palpitations. Associated with subjective fevers, palpitations that resolve with medications (names unknown)  - report decreased appetite and poor oral intake  - per them, patient has these episodes since >3 years, even preceding the CAD and stent placement in 2016  - they report a recent ER visit to [facility] in [place] for similar complaints  - Denied documented fevers, chills, night sweats, rash, cough, coryza, dysuria, loose stools, recent travel, sick contacts    primary team notes reviewed [x]  other consultant notes reviewed [x]    PAST MEDICAL & SURGICAL HISTORY:  HLD (hyperlipidemia)  HTN (hypertension)  Hypothyroidism  Esophageal tear: S/P clipping  DM (diabetes mellitus)  CAD (coronary artery disease): S/P stent placement  Asthma  S/P cholecystectomy  S/P coronary artery stent placement  S/P cataract extraction    Allergies  No Known Allergies    ANTIMICROBIALS (past 90 days)  MEDICATIONS  (STANDING):  azithromycin  IVPB   250 mL/Hr IV Intermittent (09-06-19 @ 17:44)    cefTRIAXone   IVPB   100 mL/Hr IV Intermittent (09-08-19 @ 14:25)   100 mL/Hr IV Intermittent (09-07-19 @ 14:25)    cefTRIAXone   IVPB   100 mL/Hr IV Intermittent (09-06-19 @ 13:47)      ANTIMICROBIALS:    cefTRIAXone   IVPB 1000 every 24 hours    OTHER MEDS: MEDICATIONS  (STANDING):  acetaminophen   Tablet .. 650 every 6 hours PRN  aspirin enteric coated 81 daily  atorvastatin 20 at bedtime  citalopram 20 daily  dextrose 40% Gel 15 once PRN  dextrose 50% Injectable 12.5 once  dextrose 50% Injectable 25 once  dextrose 50% Injectable 25 once  enoxaparin Injectable 40 daily  furosemide    Tablet 20 daily  glucagon  Injectable 1 once PRN  influenza   Vaccine 0.5 once  insulin lispro (HumaLOG) corrective regimen sliding scale  three times a day before meals  insulin lispro (HumaLOG) corrective regimen sliding scale  at bedtime  levothyroxine 75 daily  metoprolol tartrate 25 two times a day  pantoprazole    Tablet 40 before breakfast  ranolazine 500 two times a day  ticagrelor 60 two times a day    SOCIAL HISTORY:  - migrated from [place] many years back  - lives with daughters in [place], no pets  - no recent travel to [place], no raw foods consumption    FAMILY HISTORY:  No pertinent family history in first degree relatives    REVIEW OF SYSTEMS  All other systems negative except as noted below:	  Constitutional:  [ ] fever [ ] chills  [ ] weight loss  [x] weakness  Skin:  [ ] rash [ ] phlebitis	  Eyes: [ ] icterus [ ] pain  [ ] discharge	  ENMT: [ ] sore throat  [ ] thrush [ ] ulcers [ ] exudates  Respiratory: [x] dyspnea [ ] hemoptysis [ ] cough [ ] sputum	  Cardiovascular:  [ ] chest pain [x] palpitations [ ] edema	  Gastrointestinal:  [ ] nausea [ ] vomiting [ ] diarrhea [ ] constipation [ ] pain	  Genitourinary:  [ ] dysuria [ ] frequency [ ] hematuria [ ] discharge [ ] flank pain  [ ] incontinence  Musculoskeletal:  [ ] myalgias [ ] arthralgias [ ] arthritis  [ ] back pain  Neurological:  [ ] headache [ ] seizures  [ ] confusion/altered mental status  Psychiatric:  [ ] anxiety [ ] depression	  Hematology/Lymphatics:  [ ] lymphadenopathy  Endocrine:  [ ] adrenal [ ] thyroid  Allergic/Immunologic:	 [ ] transplant [ ] seasonal    Vital Signs Last 24 Hrs  T(F): 98.2 (09-08-19 @ 13:53), Max: 98.6 (09-06-19 @ 15:40)  Vital Signs Last 24 Hrs  HR: 60 (09-08-19 @ 13:53) (60 - 74)  BP: 151/60 (09-08-19 @ 13:53) (123/78 - 157/84)  RR: 18 (09-08-19 @ 13:53)  SpO2: 98% (09-08-19 @ 13:53) (96% - 98%)  Wt(kg): --    EXAM:  Constitutional: Not in acute distress  Eyes: pupils bilaterally reactive to light. No icterus.  Oral cavity: Clear, no lesions  Neck: No neck vein distension noted  RS: Chest clear to auscultation bilaterally. No wheeze/rhonchi/crepitations.  CVS: S1, S2 heard. Regular rate and rhythm. No murmurs/rubs/gallops.  Abdomen: Soft. No guarding/rigidity/tenderness.  : No acute abnormalities  Extremities: Warm. No pedal edema  Skin: No lesions noted  Vascular: No evidence of phlebitis  Neuro: Alert, oriented to time/place/person  Cranial nerves 2-12 grossly normal. No focal abnormalities                          12.5   14.25 )-----------( 301      ( 08 Sep 2019 09:29 )             42.3     09-08    127<L>  |  87<L>  |  14  ----------------------------<  118<H>  4.3   |  28  |  1.09    Ca    8.9      08 Sep 2019 14:43  Phos  3.9     09-08  Mg     2.1     09-08    TPro  6.8  /  Alb  4.2  /  TBili  0.5  /  DBili  x   /  AST  15  /  ALT  13  /  AlkPhos  83  09-07    MICROBIOLOGY:  Culture - Blood (collected 06 Sep 2019 14:58)  Source: BLOOD VENOUS  Preliminary Report (08 Sep 2019 14:58):    NO ORGANISMS ISOLATED    NO ORGANISMS ISOLATED AT 48 HRS.    Culture - Blood (collected 06 Sep 2019 14:58)  Source: BLOOD PERIPHERAL  Preliminary Report (07 Sep 2019 11:59):    ***Blood Panel PCR results on this specimen are available    approximately 3 hours after the Gram stain result***    Gram stain, PCR, and/or culture results may not always    correspond due to difference in methodologies    ------------------------------------------------------------    This PCR assay was performed using [employer].  The    following targets are tested for:  Enterococcus, vancomycin    resistant enterococci, Listeria monocytogenes,  coagulase    negative staphylococci, S. aureus, methicillin resistant S.    aureus, Streptococcus agalactiae (Group B), S. pneumoniae,    S. pyogenes (Group A), Acinetobacter baumannii, Enterobacter    cloacae, E. coli, Klebsiella oxytoca, K. pneumoniae, Proteus    sp., Serratia marcescens, Haemophilus influenzae, Neisseria    meningitidis, Pseudomonas aeruginosa, Candida albicans, C.    glabrata, C. krusei, C. parapsilosis, C. tropicalis and the    KPC resistance gene.    **NOTE: Due to technical problems, Proteus sp. will NOT be    reported as part of the BCID paneluntil further notice.  Final Report (08 Sep 2019 11:50):    Single set isolate, possible contaminant.    Contact microbiology if susceptibility testing is clinically    indicated.  Organism: BLOOD CULTURE PCR  Staphylococcus sp.,coag neg (08 Sep 2019 09:22)  Organism: Staphylococcus sp.,coag neg (08 Sep 2019 09:22)  Organism: BLOOD CULTURE PCR  ***Blood Panel PCR results on this specimen are available  approximately 3 hours after the Gram stain result***  Gram stain, PCR, and/or culture results may not always  correspond due to difference in methodologies  ------------------------------------------------------------  This PCR assay was performed using [employer].  The  following targets are tested for:  Enterococcus, vancomycin  resistant enterococci, Listeria monocytogenes,  coagulase  negative staphylococci, S. aureus, methicillin resistant S.  aureus, Streptococcus agalactiae (Group B), S. pneumoniae,  S. pyogenes (Group A), Acinetobacter baumannii, Enterobacter  cloacae, E. coli, Klebsiella oxytoca, K. pneumoniae, Proteus  sp., Serratia marcescens, Haemophilus influenzae, Neisseria  meningitidis, Pseudomonas aeruginosa, Candida albicans, C.  glabrata, C. krusei, C. parapsilosis, C. tropicalis and the  KPC resistance gene.  **NOTE: Due to technical problems, Proteus sp. will NOT be  reported as part of the BCID panel until further notice. (08 Sep 2019 09:22)      -  Coagulase negative Staphylococcus: + DETECT JAZ      Method Type: PCR    RADIOLOGY:  imaging below personally reviewed  < from: CT Angio Chest w/ IV Cont (09.08.19 @ 10:10) >  1.  No pulmonary embolus.  2.  Cardiomegaly.  < end of copied text >    < from: CT Abdomen and Pelvis w/ Oral Cont and w/ IV Cont (09.06.19 @ 11:52) >  Status post cholecystectomy. Mild intra and extra hepatic   biliary ductal dilatation as noted on ultrasound performed the same day.   Correlation with liver function tests is recommended.    New tree-in-bud opacities in the right lower lobe.    Prominent central low attenuation in the endometrial canal. Pelvic   ultrasound may be obtained as indicated to assess for endometrial   pathology.    < end of copied text >    < from: Xray Chest 1 View- PORTABLE-Urgent (09.06.19 @ 06:31) >  Clear lungs.    < end of copied text >    < from: US Abdomen Limited (09.06.19 @ 05:58) >  Status post cholecystectomy. Otherwise unremarkable right upper quadrant   sonogram.    < end of copied text >    OTHER TESTS:  < from: 12 Lead ECG (09.06.19 @ 02:13) >  Sinus rhythm with Premature atrial complexes  Left axis deviation  Minimal voltage criteria for LVH, may be normal variant  Anterolateral infarct , age undetermined    < end of copied text >

## 2019-09-09 LAB
ANION GAP SERPL CALC-SCNC: 13 MMO/L — SIGNIFICANT CHANGE UP (ref 7–14)
BASOPHILS # BLD AUTO: 0.03 K/UL — SIGNIFICANT CHANGE UP (ref 0–0.2)
BASOPHILS NFR BLD AUTO: 0.3 % — SIGNIFICANT CHANGE UP (ref 0–2)
BUN SERPL-MCNC: 13 MG/DL — SIGNIFICANT CHANGE UP (ref 7–23)
CALCIUM SERPL-MCNC: 8.8 MG/DL — SIGNIFICANT CHANGE UP (ref 8.4–10.5)
CHLORIDE SERPL-SCNC: 86 MMOL/L — LOW (ref 98–107)
CO2 SERPL-SCNC: 26 MMOL/L — SIGNIFICANT CHANGE UP (ref 22–31)
CREAT SERPL-MCNC: 0.82 MG/DL — SIGNIFICANT CHANGE UP (ref 0.5–1.3)
EOSINOPHIL # BLD AUTO: 0.37 K/UL — SIGNIFICANT CHANGE UP (ref 0–0.5)
EOSINOPHIL NFR BLD AUTO: 3.4 % — SIGNIFICANT CHANGE UP (ref 0–6)
GLUCOSE BLDC GLUCOMTR-MCNC: 103 MG/DL — HIGH (ref 70–99)
GLUCOSE BLDC GLUCOMTR-MCNC: 113 MG/DL — HIGH (ref 70–99)
GLUCOSE BLDC GLUCOMTR-MCNC: 120 MG/DL — HIGH (ref 70–99)
GLUCOSE BLDC GLUCOMTR-MCNC: 146 MG/DL — HIGH (ref 70–99)
GLUCOSE SERPL-MCNC: 131 MG/DL — HIGH (ref 70–99)
HCT VFR BLD CALC: 37.7 % — SIGNIFICANT CHANGE UP (ref 34.5–45)
HCT VFR BLD CALC: 37.7 % — SIGNIFICANT CHANGE UP (ref 34.5–45)
HGB BLD-MCNC: 11.7 G/DL — SIGNIFICANT CHANGE UP (ref 11.5–15.5)
HGB BLD-MCNC: 11.7 G/DL — SIGNIFICANT CHANGE UP (ref 11.5–15.5)
IMM GRANULOCYTES NFR BLD AUTO: 0.5 % — SIGNIFICANT CHANGE UP (ref 0–1.5)
LYMPHOCYTES # BLD AUTO: 2.76 K/UL — SIGNIFICANT CHANGE UP (ref 1–3.3)
LYMPHOCYTES # BLD AUTO: 25.3 % — SIGNIFICANT CHANGE UP (ref 13–44)
MAGNESIUM SERPL-MCNC: 2 MG/DL — SIGNIFICANT CHANGE UP (ref 1.6–2.6)
MCHC RBC-ENTMCNC: 23 PG — LOW (ref 27–34)
MCHC RBC-ENTMCNC: 23 PG — LOW (ref 27–34)
MCHC RBC-ENTMCNC: 31 % — LOW (ref 32–36)
MCHC RBC-ENTMCNC: 31 % — LOW (ref 32–36)
MCV RBC AUTO: 74.1 FL — LOW (ref 80–100)
MCV RBC AUTO: 74.1 FL — LOW (ref 80–100)
MONOCYTES # BLD AUTO: 1.13 K/UL — HIGH (ref 0–0.9)
MONOCYTES NFR BLD AUTO: 10.4 % — SIGNIFICANT CHANGE UP (ref 2–14)
NEUTROPHILS # BLD AUTO: 6.56 K/UL — SIGNIFICANT CHANGE UP (ref 1.8–7.4)
NEUTROPHILS NFR BLD AUTO: 60.1 % — SIGNIFICANT CHANGE UP (ref 43–77)
NRBC # FLD: 0 K/UL — SIGNIFICANT CHANGE UP (ref 0–0)
NRBC # FLD: 0 K/UL — SIGNIFICANT CHANGE UP (ref 0–0)
OSMOLALITY UR: 345 MOSMO/KG — SIGNIFICANT CHANGE UP (ref 50–1200)
PHOSPHATE SERPL-MCNC: 3.4 MG/DL — SIGNIFICANT CHANGE UP (ref 2.5–4.5)
PLATELET # BLD AUTO: 258 K/UL — SIGNIFICANT CHANGE UP (ref 150–400)
PLATELET # BLD AUTO: 258 K/UL — SIGNIFICANT CHANGE UP (ref 150–400)
PMV BLD: 10.2 FL — SIGNIFICANT CHANGE UP (ref 7–13)
PMV BLD: 10.2 FL — SIGNIFICANT CHANGE UP (ref 7–13)
POTASSIUM SERPL-MCNC: 3.9 MMOL/L — SIGNIFICANT CHANGE UP (ref 3.5–5.3)
POTASSIUM SERPL-SCNC: 3.9 MMOL/L — SIGNIFICANT CHANGE UP (ref 3.5–5.3)
RBC # BLD: 5.09 M/UL — SIGNIFICANT CHANGE UP (ref 3.8–5.2)
RBC # BLD: 5.09 M/UL — SIGNIFICANT CHANGE UP (ref 3.8–5.2)
RBC # FLD: 17.1 % — HIGH (ref 10.3–14.5)
RBC # FLD: 17.1 % — HIGH (ref 10.3–14.5)
SODIUM SERPL-SCNC: 125 MMOL/L — LOW (ref 135–145)
SODIUM UR-SCNC: 57 MMOL/L — SIGNIFICANT CHANGE UP
SPECIMEN SOURCE: SIGNIFICANT CHANGE UP
WBC # BLD: 10.83 K/UL — HIGH (ref 3.8–10.5)
WBC # BLD: 10.83 K/UL — HIGH (ref 3.8–10.5)
WBC # FLD AUTO: 10.83 K/UL — HIGH (ref 3.8–10.5)
WBC # FLD AUTO: 10.83 K/UL — HIGH (ref 3.8–10.5)

## 2019-09-09 PROCEDURE — 99232 SBSQ HOSP IP/OBS MODERATE 35: CPT

## 2019-09-09 RX ORDER — AMLODIPINE BESYLATE 2.5 MG/1
2.5 TABLET ORAL DAILY
Refills: 0 | Status: DISCONTINUED | OUTPATIENT
Start: 2019-09-09 | End: 2019-09-13

## 2019-09-09 RX ORDER — CLOPIDOGREL BISULFATE 75 MG/1
75 TABLET, FILM COATED ORAL DAILY
Refills: 0 | Status: DISCONTINUED | OUTPATIENT
Start: 2019-09-10 | End: 2019-09-13

## 2019-09-09 RX ADMIN — ENOXAPARIN SODIUM 40 MILLIGRAM(S): 100 INJECTION SUBCUTANEOUS at 19:08

## 2019-09-09 RX ADMIN — CITALOPRAM 20 MILLIGRAM(S): 10 TABLET, FILM COATED ORAL at 12:18

## 2019-09-09 RX ADMIN — Medication 81 MILLIGRAM(S): at 12:18

## 2019-09-09 RX ADMIN — Medication 25 MILLIGRAM(S): at 06:36

## 2019-09-09 RX ADMIN — ATORVASTATIN CALCIUM 20 MILLIGRAM(S): 80 TABLET, FILM COATED ORAL at 22:07

## 2019-09-09 RX ADMIN — Medication 75 MICROGRAM(S): at 06:36

## 2019-09-09 RX ADMIN — Medication 30 MILLILITER(S): at 19:08

## 2019-09-09 RX ADMIN — PANTOPRAZOLE SODIUM 40 MILLIGRAM(S): 20 TABLET, DELAYED RELEASE ORAL at 06:36

## 2019-09-09 RX ADMIN — Medication 25 MILLIGRAM(S): at 19:08

## 2019-09-09 NOTE — PROGRESS NOTE ADULT - SUBJECTIVE AND OBJECTIVE BOX
S/   Pt denies chest pain and SOB at rest    ROS: +epigastric pain; no fever or cough     Allergies  No Known Allergies    ANTIMICROBIALS:      OTHER MEDS:  acetaminophen   Tablet .. 650 milliGRAM(s) Oral every 6 hours PRN  aluminum hydroxide/magnesium hydroxide/simethicone Suspension 30 milliLiter(s) Oral every 6 hours PRN  aspirin enteric coated 81 milliGRAM(s) Oral daily  atorvastatin 20 milliGRAM(s) Oral at bedtime  citalopram 20 milliGRAM(s) Oral daily  dextrose 40% Gel 15 Gram(s) Oral once PRN  dextrose 5%. 1000 milliLiter(s) IV Continuous <Continuous>  dextrose 50% Injectable 12.5 Gram(s) IV Push once  dextrose 50% Injectable 25 Gram(s) IV Push once  dextrose 50% Injectable 25 Gram(s) IV Push once  enoxaparin Injectable 40 milliGRAM(s) SubCutaneous daily  furosemide    Tablet 20 milliGRAM(s) Oral daily  glucagon  Injectable 1 milliGRAM(s) IntraMuscular once PRN  influenza   Vaccine 0.5 milliLiter(s) IntraMuscular once  insulin lispro (HumaLOG) corrective regimen sliding scale   SubCutaneous three times a day before meals  insulin lispro (HumaLOG) corrective regimen sliding scale   SubCutaneous at bedtime  levothyroxine 75 MICROGram(s) Oral daily  metoprolol tartrate 25 milliGRAM(s) Oral two times a day  pantoprazole    Tablet 40 milliGRAM(s) Oral before breakfast  ranolazine 500 milliGRAM(s) Oral two times a day      Vital Signs Last 24 Hrs  T(C): 36.3 (09 Sep 2019 06:32), Max: 36.7 (08 Sep 2019 17:09)  T(F): 97.4 (09 Sep 2019 06:32), Max: 98 (08 Sep 2019 17:09)  HR: 66 (09 Sep 2019 06:32) (66 - 71)  BP: 141/77 (09 Sep 2019 06:32) (141/77 - 160/83)  BP(mean): --  RR: 18 (09 Sep 2019 06:32) (17 - 18)  SpO2: 98% (09 Sep 2019 06:32) (97% - 98%)    Physical Exam:  General:  elderly, frail, anxious but non toxic  Head: atraumatic, normocephalic  Eye: normal sclera and conjunctiva  ENT: no gross oropharyngeal lesions  Cardio: regular rate and rhythm   Respiratory: nonlabored, clear b/l, no wheezing  abd: soft, BS +, no tenderness  Musculoskeletal:   no joint swelling,   no edema  vascular: no phlebitis, normal pulses  Skin:    no rash  psych: anxious                           11.7   10.83 )-----------( 258      ( 09 Sep 2019 06:04 )             37.7       09-09    125<L>  |  86<L>  |  13  ----------------------------<  131<H>  3.9   |  26  |  0.82    Ca    8.8      09 Sep 2019 06:04  Phos  3.4     09-09  Mg     2.0     09-09            MICROBIOLOGY:  Culture - Blood in AM (09.08.19 @ 10:20)    Culture - Blood:   NO ORGANISMS ISOLATED  NO ORGANISMS ISOLATED AT 24 HOURS    Specimen Source: BLOOD VENOUS    Culture - Blood (09.06.19 @ 14:58)    Culture - Blood:   NO ORGANISMS ISOLATED  NO ORGANISMS ISOLATED AT 48 HRS.    Specimen Source: BLOOD VENOUS    Culture - Blood (09.06.19 @ 14:58)    -  Coagulase negative Staphylococcus: + DETECT JAZ  AFTER: 16 HOURS INCUBATION  BOTTLE: AEROBIC BOTTLE    RADIOLOGY:  Images below reviewed personally  CT Angio Chest w/ IV Cont (09.08.19 @ 10:10)   1.  No pulmonary embolus.  2.  Cardiomegaly.

## 2019-09-09 NOTE — PROGRESS NOTE ADULT - SUBJECTIVE AND OBJECTIVE BOX
Patient is a 84y old  Female who presents with a chief complaint of Chest pain (08 Sep 2019 16:23)      SUBJECTIVE / OVERNIGHT EVENTS:    Events noted.  CONSTITUTIONAL: Uneasiness  RESPIRATORY: No cough, wheezing,  No shortness of breath  CARDIOVASCULAR: No chest pain, palpitations, dizziness, or leg swelling  GASTROINTESTINAL: No abdominal or epigastric pain. No nausea, vomiting.  NEUROLOGICAL: No headaches,     MEDICATIONS  (STANDING):  aspirin enteric coated 81 milliGRAM(s) Oral daily  atorvastatin 20 milliGRAM(s) Oral at bedtime  citalopram 20 milliGRAM(s) Oral daily  dextrose 5%. 1000 milliLiter(s) (50 mL/Hr) IV Continuous <Continuous>  dextrose 50% Injectable 12.5 Gram(s) IV Push once  dextrose 50% Injectable 25 Gram(s) IV Push once  dextrose 50% Injectable 25 Gram(s) IV Push once  enoxaparin Injectable 40 milliGRAM(s) SubCutaneous daily  furosemide    Tablet 20 milliGRAM(s) Oral daily  influenza   Vaccine 0.5 milliLiter(s) IntraMuscular once  insulin lispro (HumaLOG) corrective regimen sliding scale   SubCutaneous three times a day before meals  insulin lispro (HumaLOG) corrective regimen sliding scale   SubCutaneous at bedtime  levothyroxine 75 MICROGram(s) Oral daily  metoprolol tartrate 25 milliGRAM(s) Oral two times a day  pantoprazole    Tablet 40 milliGRAM(s) Oral before breakfast  ranolazine 500 milliGRAM(s) Oral two times a day  ticagrelor 60 milliGRAM(s) Oral two times a day    MEDICATIONS  (PRN):  acetaminophen   Tablet .. 650 milliGRAM(s) Oral every 6 hours PRN Temp greater or equal to 38C (100.4F), Mild Pain (1 - 3), Moderate Pain (4 - 6)  aluminum hydroxide/magnesium hydroxide/simethicone Suspension 30 milliLiter(s) Oral every 6 hours PRN Dyspepsia  dextrose 40% Gel 15 Gram(s) Oral once PRN Blood Glucose LESS THAN 70 milliGRAM(s)/deciliter  glucagon  Injectable 1 milliGRAM(s) IntraMuscular once PRN Glucose LESS THAN 70 milligrams/deciliter        CAPILLARY BLOOD GLUCOSE      POCT Blood Glucose.: 103 mg/dL (08 Sep 2019 16:46)  POCT Blood Glucose.: 133 mg/dL (08 Sep 2019 11:42)  POCT Blood Glucose.: 99 mg/dL (08 Sep 2019 07:33)  POCT Blood Glucose.: 113 mg/dL (08 Sep 2019 04:04)    I&O's Summary      PHYSICAL EXAM:  GENERAL: NAD  NECK: Supple, No JVD  CHEST/LUNG: Clear to auscultation bilaterally; No wheezing.  HEART: Regular rate and rhythm; No murmurs, rubs, or gallops  ABDOMEN: Soft, Nontender, Nondistended; Bowel sounds present  EXTREMITIES:   No edema  NEUROLOGY: AAO X 3      LABS:                        12.5   14.25 )-----------( 301      ( 08 Sep 2019 09:29 )             42.3     09-08    127<L>  |  87<L>  |  14  ----------------------------<  118<H>  4.3   |  28  |  1.09    Ca    8.9      08 Sep 2019 14:43  Phos  3.9     09-08  Mg     2.1     09-08    TPro  6.8  /  Alb  4.2  /  TBili  0.5  /  DBili  x   /  AST  15  /  ALT  13  /  AlkPhos  83  09-07            CAPILLARY BLOOD GLUCOSE      POCT Blood Glucose.: 103 mg/dL (08 Sep 2019 16:46)  POCT Blood Glucose.: 133 mg/dL (08 Sep 2019 11:42)  POCT Blood Glucose.: 99 mg/dL (08 Sep 2019 07:33)  POCT Blood Glucose.: 113 mg/dL (08 Sep 2019 04:04)    09-06 @ 14:58  Culture-urine --  Culture results --  method type PCR  Organism BLOOD CULTURE PCR  ***Blood Panel PCR results on this specimen are available  approximately 3 hours after the Gram stain result***  Gram stain, PCR, and/or culture results may not always  correspond due to difference in methodologies  ------------------------------------------------------------  This PCR assay was performed using [employer].  The  following targets are tested for:  Enterococcus, vancomycin  resistant enterococci, Listeria monocytogenes,  coagulase  negative staphylococci, S. aureus, methicillin resistant S.  aureus, Streptococcus agalactiae (Group B), S. pneumoniae,  S. pyogenes (Group A), Acinetobacter baumannii, Enterobacter  cloacae, E. coli, Klebsiella oxytoca, K. pneumoniae, Proteus  sp., Serratia marcescens, Haemophilus influenzae, Neisseria  meningitidis, Pseudomonas aeruginosa, Candida albicans, C.  glabrata, C. krusei, C. parapsilosis, C. tropicalis and the  KPC resistance gene.  **NOTE: Due to technical problems, Proteus sp. will NOT be  reported as part of the BCID panel until further notice.  Organism Identification BLOOD CULTURE PCR  Staphylococcus sp.,coag neg  Specimen source BLOOD PERIPHERAL           09-06 @ 14:58  Culture blood   ***Blood Panel PCR results on this specimen are available  approximately 3 hours after the Gram stain result***  Gram stain, PCR, and/or culture results may not always  correspond due to difference in methodologies  ------------------------------------------------------------  This PCR assay was performed using [employer].  The  following targets are tested for:  Enterococcus, vancomycin  resistant enterococci, Listeria monocytogenes,  coagulase  negative staphylococci, S. aureus, methicillin resistant S.  aureus, Streptococcus agalactiae (Group B), S. pneumoniae,  S. pyogenes (Group A), Acinetobacter baumannii, Enterobacter  cloacae, E. coli, Klebsiella oxytoca, K. pneumoniae, Proteus  sp., Serratia marcescens, Haemophilus influenzae, Neisseria  meningitidis, Pseudomonas aeruginosa, Candida albicans, C.  glabrata, C. krusei, C. parapsilosis, C. tropicalis and the  KPC resistance gene.  **NOTE: Due to technical problems, Proteus sp. will NOT be  reported as part of the BCID paneluntil further notice.  Culture results --  Gram stain --  Gram stain blood   ***** CRITICAL RESULT *****  PERSON CALLED / READ-BACK:DESIREE ARNETT/MALINDA/REGI  DATE / TIME CALLED: 09/07/19 1025  CALLED BY: SERJIO COCHRAN  GPCCL^Gram Pos Cocci In Clusters  AFTER: 16 HOURS INCUBATION  BOTTLE: AEROBIC BOTTLE  Method type PCR  Organism BLOOD CULTURE PCR  ***Blood Panel PCR results on this specimen are available  approximately 3 hours after the Gram stain result***  Gram stain, PCR, and/or culture results may not always  correspond due to difference in methodologies  ------------------------------------------------------------  This PCR assay was performed using [employer].  The  following targets are tested for:  Enterococcus, vancomycin  resistant enterococci, Listeria monocytogenes,  coagulase  negative staphylococci, S. aureus, methicillin resistant S.  aureus, Streptococcus agalactiae (Group B), S. pneumoniae,  S. pyogenes (Group A), Acinetobacter baumannii, Enterobacter  cloacae, E. coli, Klebsiella oxytoca, K. pneumoniae, Proteus  sp., Serratia marcescens, Haemophilus influenzae, Neisseria  meningitidis, Pseudomonas aeruginosa, Candida albicans, C.  glabrata, C. krusei, C. parapsilosis, C. tropicalis and the  KPC resistance gene.  **NOTE: Due to technical problems, Proteus sp. will NOT be  reported as part of the BCID panel until further notice.  Organism identification BLOOD CULTURE PCR  Staphylococcus sp.,coag neg  Specimen source BLOOD PERIPHERAL      RADIOLOGY & ADDITIONAL TESTS:    Imaging Personally Reviewed:    Consultant(s) Notes Reviewed:      Care Discussed with Consultants/Other Providers:

## 2019-09-09 NOTE — PROGRESS NOTE ADULT - ASSESSMENT
85 y/o Sam speaking female with CAD S/P MI and LAD and RCA stent placement, VF S/P life vest, HTN, HLD, pre-DM, GERD, and hypothyroidism presents to ED with chest pain for the past couple weeks.    Chest pain due to Pneumonia  Epigastric pain due to Gastritis/ GERD    recent NST in my office 2 weeks ago negative for ischemia    rec:   discontinue asa  start clopidogrel 75mg daily  continue metoprolol 25mg BID, ranexa 500mg BID; atorvastatin 20mg qhs  PPI and Maalox  off antibiotics  stop lasix as pt with poor oral intake  add amlodipine 2.5mg daily for HTN    pt stable from cardiac pt of view for discharge planning  may ffup in my office in 1-2 weeks    d/w pt and son (translating)  d/w PA  will d/w Medical Attending

## 2019-09-09 NOTE — PROGRESS NOTE ADULT - PROBLEM SELECTOR PLAN 2
no pneumonia or PE : has pulm nodule which has been stable: > reason for fever: has contaminated positive blood clutres: pts family wants IDc onsult  9/9: seesm to be doing ok : abiotics have been dced: no SOB : no cough

## 2019-09-09 NOTE — CHART NOTE - NSCHARTNOTEFT_GEN_A_CORE
Called to bedside to discuss patient's medical care with daughter. During conversation patient's daughter stated that patient not taking any antiplatelet medications at home for >2 years. She also states that she is concerned about the possible side-effects related to palpitations, and ?respiratory distress. Discussed need for Brillinta w/ Dr. Dotson (cards), he stated that patient should be switched to Plavix as patient has history of stent placement and ASA should be d/c'ed secondary to high concern for gastritis. Medical attending made aware.    Juanpablo Casillas PA-C

## 2019-09-09 NOTE — PROGRESS NOTE ADULT - SUBJECTIVE AND OBJECTIVE BOX
Follow Up: Abnormal chest CT    Interval History/ROS:  [id number]. No cough, no fevers or chills. Overall feels "unwell" and "uneasy". She feels periodic waves of weakness, anxiety and "heat" coming out of her body. She had pain to the right side of her body but now it's gone and she has a little bit to her abdomen. No diarrhea, no vomiting.     Allergies  No Known Allergies    ANTIMICROBIALS:      OTHER MEDS:  acetaminophen   Tablet .. 650 milliGRAM(s) Oral every 6 hours PRN  aluminum hydroxide/magnesium hydroxide/simethicone Suspension 30 milliLiter(s) Oral every 6 hours PRN  aspirin enteric coated 81 milliGRAM(s) Oral daily  atorvastatin 20 milliGRAM(s) Oral at bedtime  citalopram 20 milliGRAM(s) Oral daily  dextrose 40% Gel 15 Gram(s) Oral once PRN  dextrose 5%. 1000 milliLiter(s) IV Continuous <Continuous>  dextrose 50% Injectable 12.5 Gram(s) IV Push once  dextrose 50% Injectable 25 Gram(s) IV Push once  dextrose 50% Injectable 25 Gram(s) IV Push once  enoxaparin Injectable 40 milliGRAM(s) SubCutaneous daily  furosemide    Tablet 20 milliGRAM(s) Oral daily  glucagon  Injectable 1 milliGRAM(s) IntraMuscular once PRN  influenza   Vaccine 0.5 milliLiter(s) IntraMuscular once  insulin lispro (HumaLOG) corrective regimen sliding scale   SubCutaneous three times a day before meals  insulin lispro (HumaLOG) corrective regimen sliding scale   SubCutaneous at bedtime  levothyroxine 75 MICROGram(s) Oral daily  metoprolol tartrate 25 milliGRAM(s) Oral two times a day  pantoprazole    Tablet 40 milliGRAM(s) Oral before breakfast  ranolazine 500 milliGRAM(s) Oral two times a day      Vital Signs Last 24 Hrs  T(C): 36.3 (09 Sep 2019 06:32), Max: 36.7 (08 Sep 2019 17:09)  T(F): 97.4 (09 Sep 2019 06:32), Max: 98 (08 Sep 2019 17:09)  HR: 66 (09 Sep 2019 06:32) (66 - 71)  BP: 141/77 (09 Sep 2019 06:32) (141/77 - 160/83)  BP(mean): --  RR: 18 (09 Sep 2019 06:32) (17 - 18)  SpO2: 98% (09 Sep 2019 06:32) (97% - 98%)    Physical Exam:  General:  elderly, frail, anxious but non toxic  Head: atraumatic, normocephalic  Eye: normal sclera and conjunctiva  ENT: no gross oropharyngeal lesions  Cardio: regular rate and rhythm   Respiratory: nonlabored, clear b/l, no wheezing  abd: soft, BS +, no tenderness  Musculoskeletal:   no joint swelling,   no edema  vascular: no phlebitis, normal pulses  Skin:    no rash  psych: anxious                           11.7   10.83 )-----------( 258      ( 09 Sep 2019 06:04 )             37.7       09-09    125<L>  |  86<L>  |  13  ----------------------------<  131<H>  3.9   |  26  |  0.82    Ca    8.8      09 Sep 2019 06:04  Phos  3.4     09-09  Mg     2.0     09-09            MICROBIOLOGY:  Culture - Blood in AM (09.08.19 @ 10:20)    Culture - Blood:   NO ORGANISMS ISOLATED  NO ORGANISMS ISOLATED AT 24 HOURS    Specimen Source: BLOOD VENOUS    Culture - Blood (09.06.19 @ 14:58)    Culture - Blood:   NO ORGANISMS ISOLATED  NO ORGANISMS ISOLATED AT 48 HRS.    Specimen Source: BLOOD VENOUS    Culture - Blood (09.06.19 @ 14:58)    -  Coagulase negative Staphylococcus: + DETECT JAZ  AFTER: 16 HOURS INCUBATION  BOTTLE: AEROBIC BOTTLE    RADIOLOGY:  Images below reviewed personally  CT Angio Chest w/ IV Cont (09.08.19 @ 10:10)   1.  No pulmonary embolus.  2.  Cardiomegaly.

## 2019-09-09 NOTE — PROGRESS NOTE ADULT - ASSESSMENT
84F Sam and Green[place]ic speaking with CAD s/p PCI, HTN, HLD, pre-DM, GERD, and Hypothyroidism has had recurrent episodes of vague weakness, palpitations, anxiety.   Unclear etiology but does not seem infectious - afebrile, nontoxic.   No clinical or radiographic pneumonia.   UA clear.   CoNS on blood cultures are procurement contaminant, repeat negative to date and no signs of SSTI.   Leukocytosis of unclear significance    Recommend  -monitor off antibiotics   -hyponatremia workup as per primary team     Will sign off. Please reconsult if needed     Donald Hodges MD   [facility]   Pager [phone]   After 5PM and on weekends please call [phone]

## 2019-09-09 NOTE — PROGRESS NOTE ADULT - ASSESSMENT
· Assessment	  83 y/o Sam speaking female with a PMHx of CAD S/P stent placement, VF S/P life vest, HTN, HLD, pre-DM, GERD, and hypothyroidism presents to ED with pleuritic right sided chest pain in the setting of right lower lobe pneumonia.     Problem/Plan - 1:  ·  Problem: Atypical chest pain.  Plan: Chest pain atypical likely in the setting of pleurisy from underlying pneumonia  Cardio f/up/ Dced Brilinta.  Recent stress test negative.     Problem/Plan - 2:  ·  Problem: CAP (community acquired pneumonia).  Plan: CT abdomen and pelvis shows new RLL tree bud opacities  DC IV Abx  Pul f/up noted  ID f/up appreciated     Problem/Plan - 3:  ·  Problem: CAD (coronary artery disease).  Plan: Chest pain atypical likely in the setting of pleurisy from underlying pneumonia   cardio f/up     Problem/Plan - 5:  ·  Problem: HLD (hyperlipidemia).  Plan: Continue Atorvastatin  DASH diet.      Problem/Plan - 6:  Problem: DM (diabetes mellitus). Plan:  insulin sliding scale  Monitor finger sticks  Diabetic diet.     Problem/Plan - 7:  ·  Problem: Hypothyroidism.  Plan: Continue Levothyroxine

## 2019-09-10 LAB
ANION GAP SERPL CALC-SCNC: 13 MMO/L — SIGNIFICANT CHANGE UP (ref 7–14)
BASOPHILS # BLD AUTO: 0.04 K/UL — SIGNIFICANT CHANGE UP (ref 0–0.2)
BASOPHILS NFR BLD AUTO: 0.3 % — SIGNIFICANT CHANGE UP (ref 0–2)
BUN SERPL-MCNC: 12 MG/DL — SIGNIFICANT CHANGE UP (ref 7–23)
CALCIUM SERPL-MCNC: 8.8 MG/DL — SIGNIFICANT CHANGE UP (ref 8.4–10.5)
CHLORIDE SERPL-SCNC: 86 MMOL/L — LOW (ref 98–107)
CK MB BLD-MCNC: 5.79 NG/ML — HIGH (ref 1–4.7)
CK MB BLD-MCNC: SIGNIFICANT CHANGE UP (ref 0–2.5)
CK SERPL-CCNC: 99 U/L — SIGNIFICANT CHANGE UP (ref 25–170)
CO2 SERPL-SCNC: 25 MMOL/L — SIGNIFICANT CHANGE UP (ref 22–31)
CREAT SERPL-MCNC: 0.76 MG/DL — SIGNIFICANT CHANGE UP (ref 0.5–1.3)
EOSINOPHIL # BLD AUTO: 0.39 K/UL — SIGNIFICANT CHANGE UP (ref 0–0.5)
EOSINOPHIL NFR BLD AUTO: 2.9 % — SIGNIFICANT CHANGE UP (ref 0–6)
GLUCOSE BLDC GLUCOMTR-MCNC: 109 MG/DL — HIGH (ref 70–99)
GLUCOSE BLDC GLUCOMTR-MCNC: 128 MG/DL — HIGH (ref 70–99)
GLUCOSE BLDC GLUCOMTR-MCNC: 138 MG/DL — HIGH (ref 70–99)
GLUCOSE BLDC GLUCOMTR-MCNC: 224 MG/DL — HIGH (ref 70–99)
GLUCOSE SERPL-MCNC: 87 MG/DL — SIGNIFICANT CHANGE UP (ref 70–99)
HCT VFR BLD CALC: 39.7 % — SIGNIFICANT CHANGE UP (ref 34.5–45)
HGB BLD-MCNC: 12.1 G/DL — SIGNIFICANT CHANGE UP (ref 11.5–15.5)
IMM GRANULOCYTES NFR BLD AUTO: 0.5 % — SIGNIFICANT CHANGE UP (ref 0–1.5)
LYMPHOCYTES # BLD AUTO: 32.9 % — SIGNIFICANT CHANGE UP (ref 13–44)
LYMPHOCYTES # BLD AUTO: 4.47 K/UL — HIGH (ref 1–3.3)
MAGNESIUM SERPL-MCNC: 2.2 MG/DL — SIGNIFICANT CHANGE UP (ref 1.6–2.6)
MCHC RBC-ENTMCNC: 23 PG — LOW (ref 27–34)
MCHC RBC-ENTMCNC: 30.5 % — LOW (ref 32–36)
MCV RBC AUTO: 75.6 FL — LOW (ref 80–100)
MONOCYTES # BLD AUTO: 1.31 K/UL — HIGH (ref 0–0.9)
MONOCYTES NFR BLD AUTO: 9.6 % — SIGNIFICANT CHANGE UP (ref 2–14)
NEUTROPHILS # BLD AUTO: 7.31 K/UL — SIGNIFICANT CHANGE UP (ref 1.8–7.4)
NEUTROPHILS NFR BLD AUTO: 53.8 % — SIGNIFICANT CHANGE UP (ref 43–77)
NRBC # FLD: 0 K/UL — SIGNIFICANT CHANGE UP (ref 0–0)
PHOSPHATE SERPL-MCNC: 3.7 MG/DL — SIGNIFICANT CHANGE UP (ref 2.5–4.5)
PLATELET # BLD AUTO: 291 K/UL — SIGNIFICANT CHANGE UP (ref 150–400)
PMV BLD: 10.6 FL — SIGNIFICANT CHANGE UP (ref 7–13)
POTASSIUM SERPL-MCNC: 4.9 MMOL/L — SIGNIFICANT CHANGE UP (ref 3.5–5.3)
POTASSIUM SERPL-SCNC: 4.9 MMOL/L — SIGNIFICANT CHANGE UP (ref 3.5–5.3)
RBC # BLD: 5.25 M/UL — HIGH (ref 3.8–5.2)
RBC # FLD: 17 % — HIGH (ref 10.3–14.5)
SODIUM SERPL-SCNC: 124 MMOL/L — LOW (ref 135–145)
TROPONIN T, HIGH SENSITIVITY: 26 NG/L — SIGNIFICANT CHANGE UP (ref ?–14)
WBC # BLD: 13.59 K/UL — HIGH (ref 3.8–10.5)
WBC # FLD AUTO: 13.59 K/UL — HIGH (ref 3.8–10.5)

## 2019-09-10 RX ORDER — FAMOTIDINE 10 MG/ML
20 INJECTION INTRAVENOUS ONCE
Refills: 0 | Status: COMPLETED | OUTPATIENT
Start: 2019-09-10 | End: 2019-09-10

## 2019-09-10 RX ADMIN — PANTOPRAZOLE SODIUM 40 MILLIGRAM(S): 20 TABLET, DELAYED RELEASE ORAL at 05:11

## 2019-09-10 RX ADMIN — ENOXAPARIN SODIUM 40 MILLIGRAM(S): 100 INJECTION SUBCUTANEOUS at 17:09

## 2019-09-10 RX ADMIN — Medication 30 MILLILITER(S): at 12:19

## 2019-09-10 RX ADMIN — FAMOTIDINE 20 MILLIGRAM(S): 10 INJECTION INTRAVENOUS at 13:46

## 2019-09-10 RX ADMIN — CITALOPRAM 20 MILLIGRAM(S): 10 TABLET, FILM COATED ORAL at 11:10

## 2019-09-10 RX ADMIN — ATORVASTATIN CALCIUM 20 MILLIGRAM(S): 80 TABLET, FILM COATED ORAL at 21:37

## 2019-09-10 RX ADMIN — Medication 0.5 MILLIGRAM(S): at 13:02

## 2019-09-10 RX ADMIN — Medication 25 MILLIGRAM(S): at 05:11

## 2019-09-10 RX ADMIN — Medication 75 MICROGRAM(S): at 05:11

## 2019-09-10 RX ADMIN — AMLODIPINE BESYLATE 2.5 MILLIGRAM(S): 2.5 TABLET ORAL at 05:11

## 2019-09-10 RX ADMIN — Medication 2: at 17:06

## 2019-09-10 RX ADMIN — CLOPIDOGREL BISULFATE 75 MILLIGRAM(S): 75 TABLET, FILM COATED ORAL at 11:10

## 2019-09-10 RX ADMIN — Medication 25 MILLIGRAM(S): at 17:09

## 2019-09-10 NOTE — PROVIDER CONTACT NOTE (OTHER) - SITUATION
reports nausea, abd pain and general not feeling well.
with phone  pt c/o feeling anxious and burning in chest.

## 2019-09-10 NOTE — PHYSICAL THERAPY INITIAL EVALUATION ADULT - GENERAL OBSERVATIONS, REHAB EVAL
Consult received, chart reviewed. Patient received exiting restroom, no apparent distress. Patient agreed to Evaluation from Physical Therapist.

## 2019-09-10 NOTE — PROVIDER CONTACT NOTE (OTHER) - ASSESSMENT
alert, family at [facility] providing translation
VS stable, sinus rhythm on monitor. BETH Geronimo assessed pt.

## 2019-09-10 NOTE — PROGRESS NOTE ADULT - SUBJECTIVE AND OBJECTIVE BOX
Patient is a 84y old  Female who presents with a chief complaint of Chest pain (09 Sep 2019 17:56)      Any change in ROS: c/o uneasiness:     MEDICATIONS  (STANDING):  amLODIPine   Tablet 2.5 milliGRAM(s) Oral daily  atorvastatin 20 milliGRAM(s) Oral at bedtime  citalopram 20 milliGRAM(s) Oral daily  clopidogrel Tablet 75 milliGRAM(s) Oral daily  dextrose 5%. 1000 milliLiter(s) (50 mL/Hr) IV Continuous <Continuous>  dextrose 50% Injectable 12.5 Gram(s) IV Push once  dextrose 50% Injectable 25 Gram(s) IV Push once  dextrose 50% Injectable 25 Gram(s) IV Push once  enoxaparin Injectable 40 milliGRAM(s) SubCutaneous daily  influenza   Vaccine 0.5 milliLiter(s) IntraMuscular once  insulin lispro (HumaLOG) corrective regimen sliding scale   SubCutaneous three times a day before meals  insulin lispro (HumaLOG) corrective regimen sliding scale   SubCutaneous at bedtime  levothyroxine 75 MICROGram(s) Oral daily  metoprolol tartrate 25 milliGRAM(s) Oral two times a day  pantoprazole    Tablet 40 milliGRAM(s) Oral before breakfast  ranolazine 500 milliGRAM(s) Oral two times a day    MEDICATIONS  (PRN):  acetaminophen   Tablet .. 650 milliGRAM(s) Oral every 6 hours PRN Temp greater or equal to 38C (100.4F), Mild Pain (1 - 3), Moderate Pain (4 - 6)  aluminum hydroxide/magnesium hydroxide/simethicone Suspension 30 milliLiter(s) Oral every 6 hours PRN Dyspepsia  dextrose 40% Gel 15 Gram(s) Oral once PRN Blood Glucose LESS THAN 70 milliGRAM(s)/deciliter  glucagon  Injectable 1 milliGRAM(s) IntraMuscular once PRN Glucose LESS THAN 70 milligrams/deciliter    Vital Signs Last 24 Hrs  T(C): 36.9 (10 Sep 2019 05:09), Max: 37.1 (09 Sep 2019 14:51)  T(F): 98.4 (10 Sep 2019 05:09), Max: 98.7 (09 Sep 2019 14:51)  HR: 62 (10 Sep 2019 05:09) (60 - 64)  BP: 141/69 (10 Sep 2019 05:09) (141/69 - 156/76)  BP(mean): --  RR: 18 (10 Sep 2019 05:09) (18 - 18)  SpO2: 99% (10 Sep 2019 05:09) (99% - 99%)    I&O's Summary        Physical Exam:   GENERAL: NAD, well-groomed, well-developed  HEENT: LEW/   Atraumatic, Normocephalic  ENMT: No tonsillar erythema, exudates, or enlargement; Moist mucous membranes, Good dentition, No lesions  NECK: Supple, No JVD, Normal thyroid  CHEST/LUNG: Clear to auscultaion, ; No rales, rhonchi, wheezing, or rubs  CVS: Regular rate and rhythm; No murmurs, rubs, or gallops  GI: : Soft, Nontender, Nondistended; Bowel sounds present  NERVOUS SYSTEM:  Alert & Oriented X3  LYMPH: No lymphadenopathy noted  SKIN: No rashes or lesions  ENDOCRINOLOGY: No Thyromegaly  PSYCH: Appropriate    Labs:  28                            12.1   13.59 )-----------( 291      ( 10 Sep 2019 05:05 )             39.7                         11.7   10.83 )-----------( 258      ( 09 Sep 2019 06:04 )             37.7                         12.5   14.25 )-----------( 301      ( 08 Sep 2019 09:29 )             42.3                         11.3   12.30 )-----------( 273      ( 07 Sep 2019 05:20 )             37.3     09-10    124<L>  |  86<L>  |  12  ----------------------------<  87  4.9   |  25  |  0.76  09-09    125<L>  |  86<L>  |  13  ----------------------------<  131<H>  3.9   |  26  |  0.82  09-08    127<L>  |  87<L>  |  14  ----------------------------<  118<H>  4.3   |  28  |  1.09  09-08    129<L>  |  85<L>  |  14  ----------------------------<  121<H>  4.8   |  30  |  0.97  09-07    136  |  94<L>  |  13  ----------------------------<  112<H>  4.9   |  31  |  0.86    Ca    8.8      10 Sep 2019 05:05  Ca    8.8      09 Sep 2019 06:04  Ca    8.9      08 Sep 2019 14:43  Phos  3.7     09-10  Phos  3.4     09-09  Phos  3.9     09-08  Mg     2.2     09-10  Mg     2.0     09-09  Mg     2.1     09-08    TPro  6.8  /  Alb  4.2  /  TBili  0.5  /  DBili  x   /  AST  15  /  ALT  13  /  AlkPhos  83  09-07    CAPILLARY BLOOD GLUCOSE      POCT Blood Glucose.: 138 mg/dL (10 Sep 2019 07:54)  POCT Blood Glucose.: 120 mg/dL (09 Sep 2019 21:49)  POCT Blood Glucose.: 103 mg/dL (09 Sep 2019 16:42)  POCT Blood Glucose.: 146 mg/dL (09 Sep 2019 11:37)    < from: CT Angio Chest w/ IV Cont (09.08.19 @ 10:10) >    FINDINGS:     PULMONARY VESSELS: No pulmonary embolus. Main pulmonary artery normal in   diameter.     HEART AND VASCULATURE: The right atrium is mildly dilated. Reflux of the   contrast into the hepatic veins may represent right heart failure. The   left ventricle is dilated. No pericardial effusion.     No aortic aneurysm or dissection.    LUNGS, AIRWAYS, PLEURA: Patent central airways.     Bandlike scarring within the bilateral apices are unchanged. No pulmonary   edema. Stable 7 mm nodule anterior left upper lobe (series 2 image 43).     Cluster of 5 mm and smaller nodules within the right lower lobe likely   representing bronchiolitis or impacted distal airways. Mucoid impaction   and subsegmental atelectasis within the lingula.     No pleural effusions or pneumothorax.    MEDIASTINUM: No mass or lymphadenopathy.    UPPER ABDOMEN: Cholecystectomy.    BONES AND SOFT TISSUES: No aggressive osseous lesion.    LOWER NECK: Within normal limits.      IMPRESSION:     1.  No pulmonary embolus.    2.  Cardiomegaly.                        GUNNER ALBERT M.D., ATTENDING RADIOLOGIST  This document has been electronically signed. Sep  8 2019 10:58AM        < end of copied text >                RECENT CULTURES:  09-08 @ 10:20 BLOOD VENOUS                  NO ORGANISMS ISOLATED  NO ORGANISMS ISOLATED AT 48 HRS.  09-06 @ 14:58 BLOOD PERIPHERAL   PCR      ***** CRITICAL RESULT *****  PERSON CALLED / READ-BACK:DESIREE ARNETT/MALINDA/Y  DATE / TIME CALLED: 09/07/19 1025  CALLED BY: SERJIO COCHRAN  GPCCL^Gram Pos Cocci In Clusters  AFTER: 16 HOURS INCUBATION  BOTTLE: AEROBIC BOTTLE  BLOOD CULTURE PCR  Staphylococcus sp.,coag neg  BLOOD CULTURE PCR  ***Blood Panel PCR results on this specimen are available  approximately 3 hours after the Gram stain result***  Gram stain, PCR, and/or culture results may not always  correspond due to difference in methodologies  ------------------------------------------------------------  This PCR assay was performed using [employer].  The  following targets are tested for:  Enterococcus, vancomycin  resistant enterococci, Listeria monocytogenes,  coagulase  negative staphylococci, S. aureus, methicillin resistant S.  aureus, Streptococcus agalactiae (Group B), S. pneumoniae,  S. pyogenes (Group A), Acinetobacter baumannii, Enterobacter  cloacae, E. coli, Klebsiella oxytoca, K. pneumoniae, Proteus  sp., Serratia marcescens, Haemophilus influenzae, Neisseria  meningitidis, Pseudomonas aeruginosa, Candida albicans, C.  glabrata, C. krusei, C. parapsilosis, C. tropicalis and the  KPC resistance gene.  **NOTE: Due to technical problems, Proteus sp. will NOT be  reported as part of the BCID panel until further notice.       ***Blood Panel PCR results on this specimen are available  approximately 3 hours after the Gram stain result***  Gram stain, PCR, and/or culture results may not always  correspond due to difference in methodologies  ------------------------------------------------------------  This PCR assay was performed using [employer].  The  following targets are tested for:  Enterococcus, vancomycin  resistant enterococci, Listeria monocytogenes,  coagulase  negative staphylococci, S. aureus, methicillin resistant S.  aureus, Streptococcus agalactiae (Group B), S. pneumoniae,  S. pyogenes (Group A), Acinetobacter baumannii, Enterobacter  cloacae, E. coli, Klebsiella oxytoca, K. pneumoniae, Proteus  sp., Serratia marcescens, Haemophilus influenzae, Neisseria  meningitidis, Pseudomonas aeruginosa, Candida albicans, C.  glabrata, C. krusei, C. parapsilosis, C. tropicalis and the  KPC resistance gene.  **NOTE: Due to technical problems, Proteus sp. will NOT be  reported as part of the BCID paneluntil further notice.        RESPIRATORY CULTURES:          Studies  Chest X-RAY  CT SCAN Chest   Venous Dopplers: LE:   CT Abdomen  Others

## 2019-09-10 NOTE — PHYSICAL THERAPY INITIAL EVALUATION ADULT - ADDITIONAL COMMENTS
Pt. owns DME of straight cane, rolling walker.     Pt. was left supine in bed post PT Evaluation, no apparent distress, all lines intact. MALINDA Francois aware of pt. participation in PT.

## 2019-09-10 NOTE — PROGRESS NOTE ADULT - ASSESSMENT
· Assessment	  85 y/o Sam speaking female with a PMHx of CAD S/P stent placement, VF S/P life vest, HTN, HLD, pre-DM, GERD, and hypothyroidism presents to ED with pleuritic right sided chest pain in the setting of right lower lobe pneumonia.     Problem/Plan - 1:  ·  Problem: Atypical chest pain.  Plan: Chest pain atypical likely in the setting of pleurisy from underlying pneumonia  Cardio f/up/ Dced Brilinta.  Recent stress test negative.     Problem/Plan - 2:  ·  Problem: CAP (community acquired pneumonia).  Plan: CT abdomen and pelvis shows new RLL tree bud opacities  DC IV Abx  Pul f/up noted  ID f/up appreciated     Problem/Plan - 3:  ·  Problem: CAD (coronary artery disease).  Plan: Chest pain atypical likely in the setting of pleurisy from underlying pneumonia   cardio f/up     Problem/Plan - 5:  ·  Problem: HLD (hyperlipidemia).  Plan: Continue Atorvastatin  DASH diet.      Problem/Plan - 6:  Problem: DM (diabetes mellitus). Plan:  insulin sliding scale  Monitor finger sticks  Diabetic diet.     Problem/Plan - 7:  ·  Problem: Hypothyroidism.  Plan: Continue Levothyroxine

## 2019-09-10 NOTE — PROVIDER CONTACT NOTE (OTHER) - ACTION/TREATMENT ORDERED:
Provider will put in orders for IV tylenol.
will review
EKG done, CE sent.  Meds given as per order.

## 2019-09-10 NOTE — PHYSICAL THERAPY INITIAL EVALUATION ADULT - IMPAIRMENTS CONTRIBUTING IMPAIRED BED MOBILITY, REHAB EVAL
Detail Level: Detailed
Detail Level: Generalized
Detail Level: Zone
impaired balance/decreased strength

## 2019-09-10 NOTE — PROGRESS NOTE ADULT - PROBLEM SELECTOR PLAN 2
no pneumonia or PE : has pulm nodule which has been stable: > reason for fever: has contaminated positive blood clutres: pts family wants IDc onsult  9/9: seesm to be doing ok : abiotics have been dced: no SOB : no cough  9/10: breathing wise OK: off antibtiocs!

## 2019-09-10 NOTE — PROGRESS NOTE ADULT - SUBJECTIVE AND OBJECTIVE BOX
Patient is a 84y old  Female who presents with a chief complaint of Chest pain (08 Sep 2019 16:23)      SUBJECTIVE / OVERNIGHT EVENTS:    Events noted.  CONSTITUTIONAL: Uneasiness  RESPIRATORY: No cough, wheezing,  No shortness of breath  CARDIOVASCULAR: No chest pain, palpitations, dizziness, or leg swelling  GASTROINTESTINAL: No abdominal or epigastric pain. No nausea, vomiting.  NEUROLOGICAL: No headaches,     MEDICATIONS  (STANDING):  aspirin enteric coated 81 milliGRAM(s) Oral daily  atorvastatin 20 milliGRAM(s) Oral at bedtime  citalopram 20 milliGRAM(s) Oral daily  dextrose 5%. 1000 milliLiter(s) (50 mL/Hr) IV Continuous <Continuous>  dextrose 50% Injectable 12.5 Gram(s) IV Push once  dextrose 50% Injectable 25 Gram(s) IV Push once  dextrose 50% Injectable 25 Gram(s) IV Push once  enoxaparin Injectable 40 milliGRAM(s) SubCutaneous daily  furosemide    Tablet 20 milliGRAM(s) Oral daily  influenza   Vaccine 0.5 milliLiter(s) IntraMuscular once  insulin lispro (HumaLOG) corrective regimen sliding scale   SubCutaneous three times a day before meals  insulin lispro (HumaLOG) corrective regimen sliding scale   SubCutaneous at bedtime  levothyroxine 75 MICROGram(s) Oral daily  metoprolol tartrate 25 milliGRAM(s) Oral two times a day  pantoprazole    Tablet 40 milliGRAM(s) Oral before breakfast  ranolazine 500 milliGRAM(s) Oral two times a day  ticagrelor 60 milliGRAM(s) Oral two times a day    MEDICATIONS  (PRN):  acetaminophen   Tablet .. 650 milliGRAM(s) Oral every 6 hours PRN Temp greater or equal to 38C (100.4F), Mild Pain (1 - 3), Moderate Pain (4 - 6)  aluminum hydroxide/magnesium hydroxide/simethicone Suspension 30 milliLiter(s) Oral every 6 hours PRN Dyspepsia  dextrose 40% Gel 15 Gram(s) Oral once PRN Blood Glucose LESS THAN 70 milliGRAM(s)/deciliter  glucagon  Injectable 1 milliGRAM(s) IntraMuscular once PRN Glucose LESS THAN 70 milligrams/deciliter        CAPILLARY BLOOD GLUCOSE      POCT Blood Glucose.: 103 mg/dL (08 Sep 2019 16:46)  POCT Blood Glucose.: 133 mg/dL (08 Sep 2019 11:42)  POCT Blood Glucose.: 99 mg/dL (08 Sep 2019 07:33)  POCT Blood Glucose.: 113 mg/dL (08 Sep 2019 04:04)    I&O's Summary      PHYSICAL EXAM:  GENERAL: NAD  NECK: Supple, No JVD  CHEST/LUNG: Clear to auscultation bilaterally; No wheezing.  HEART: Regular rate and rhythm; No murmurs, rubs, or gallops  ABDOMEN: Soft, Nontender, Nondistended; Bowel sounds present  EXTREMITIES:   No edema  NEUROLOGY: AAO X 3      LABS:                        12.5   14.25 )-----------( 301      ( 08 Sep 2019 09:29 )             42.3     09-08    127<L>  |  87<L>  |  14  ----------------------------<  118<H>  4.3   |  28  |  1.09    Ca    8.9      08 Sep 2019 14:43  Phos  3.9     09-08  Mg     2.1     09-08    TPro  6.8  /  Alb  4.2  /  TBili  0.5  /  DBili  x   /  AST  15  /  ALT  13  /  AlkPhos  83  09-07            CAPILLARY BLOOD GLUCOSE      POCT Blood Glucose.: 103 mg/dL (08 Sep 2019 16:46)  POCT Blood Glucose.: 133 mg/dL (08 Sep 2019 11:42)  POCT Blood Glucose.: 99 mg/dL (08 Sep 2019 07:33)  POCT Blood Glucose.: 113 mg/dL (08 Sep 2019 04:04)    09-06 @ 14:58  Culture-urine --  Culture results --  method type PCR  Organism BLOOD CULTURE PCR  ***Blood Panel PCR results on this specimen are available  approximately 3 hours after the Gram stain result***  Gram stain, PCR, and/or culture results may not always  correspond due to difference in methodologies  ------------------------------------------------------------  This PCR assay was performed using [employer].  The  following targets are tested for:  Enterococcus, vancomycin  resistant enterococci, Listeria monocytogenes,  coagulase  negative staphylococci, S. aureus, methicillin resistant S.  aureus, Streptococcus agalactiae (Group B), S. pneumoniae,  S. pyogenes (Group A), Acinetobacter baumannii, Enterobacter  cloacae, E. coli, Klebsiella oxytoca, K. pneumoniae, Proteus  sp., Serratia marcescens, Haemophilus influenzae, Neisseria  meningitidis, Pseudomonas aeruginosa, Candida albicans, C.  glabrata, C. krusei, C. parapsilosis, C. tropicalis and the  KPC resistance gene.  **NOTE: Due to technical problems, Proteus sp. will NOT be  reported as part of the BCID panel until further notice.  Organism Identification BLOOD CULTURE PCR  Staphylococcus sp.,coag neg  Specimen source BLOOD PERIPHERAL           09-06 @ 14:58  Culture blood   ***Blood Panel PCR results on this specimen are available  approximately 3 hours after the Gram stain result***  Gram stain, PCR, and/or culture results may not always  correspond due to difference in methodologies  ------------------------------------------------------------  This PCR assay was performed using [employer].  The  following targets are tested for:  Enterococcus, vancomycin  resistant enterococci, Listeria monocytogenes,  coagulase  negative staphylococci, S. aureus, methicillin resistant S.  aureus, Streptococcus agalactiae (Group B), S. pneumoniae,  S. pyogenes (Group A), Acinetobacter baumannii, Enterobacter  cloacae, E. coli, Klebsiella oxytoca, K. pneumoniae, Proteus  sp., Serratia marcescens, Haemophilus influenzae, Neisseria  meningitidis, Pseudomonas aeruginosa, Candida albicans, C.  glabrata, C. krusei, C. parapsilosis, C. tropicalis and the  KPC resistance gene.  **NOTE: Due to technical problems, Proteus sp. will NOT be  reported as part of the BCID paneluntil further notice.  Culture results --  Gram stain --  Gram stain blood   ***** CRITICAL RESULT *****  PERSON CALLED / READ-BACK:DESIREE ARNETT/MALINDA/REGI  DATE / TIME CALLED: 09/07/19 1025  CALLED BY: SERJIO COCHRAN  GPCCL^Gram Pos Cocci In Clusters  AFTER: 16 HOURS INCUBATION  BOTTLE: AEROBIC BOTTLE  Method type PCR  Organism BLOOD CULTURE PCR  ***Blood Panel PCR results on this specimen are available  approximately 3 hours after the Gram stain result***  Gram stain, PCR, and/or culture results may not always  correspond due to difference in methodologies  ------------------------------------------------------------  This PCR assay was performed using [employer].  The  following targets are tested for:  Enterococcus, vancomycin  resistant enterococci, Listeria monocytogenes,  coagulase  negative staphylococci, S. aureus, methicillin resistant S.  aureus, Streptococcus agalactiae (Group B), S. pneumoniae,  S. pyogenes (Group A), Acinetobacter baumannii, Enterobacter  cloacae, E. coli, Klebsiella oxytoca, K. pneumoniae, Proteus  sp., Serratia marcescens, Haemophilus influenzae, Neisseria  meningitidis, Pseudomonas aeruginosa, Candida albicans, C.  glabrata, C. krusei, C. parapsilosis, C. tropicalis and the  KPC resistance gene.  **NOTE: Due to technical problems, Proteus sp. will NOT be  reported as part of the BCID panel until further notice.  Organism identification BLOOD CULTURE PCR  Staphylococcus sp.,coag neg  Specimen source BLOOD PERIPHERAL      RADIOLOGY & ADDITIONAL TESTS:    Imaging Personally Reviewed:    Consultant(s) Notes Reviewed:      Care Discussed with Consultants/Other Providers:

## 2019-09-10 NOTE — PROVIDER CONTACT NOTE (OTHER) - REASON
Pt noted restless c/o feeling anxious and burning in chest.
pt. is c/o body aches
pt/family report pt not tolerating zithromax iv

## 2019-09-10 NOTE — CHART NOTE - NSCHARTNOTEFT_GEN_A_CORE
Called by RN for patient complaining of chest pain. With  phone and aide at bedside, patient states that she is feeling anxious and has a burning type pain in her chest. She denies other chest pains, SOB, tingling/numbness, visual changes or HA.    On exam patient resting in bed in mild distress and pointing to her stomach. Good S1 S1 RRR. Lungs CTABL.    Plan:  - EKG  - s/p Maalox x1 with no relief  - Cardiac enzymes  - Pepcid 20mg x1    Juanpablo Casillas PA-C Called by RN for patient complaining of chest pain. With  phone and aide at bedside, patient states that she is feeling anxious and has a burning type pain in her chest. She denies other chest pains, SOB, tingling/numbness, visual changes or HA.    On exam patient resting in bed in mild distress and pointing to her stomach. Good S1 S1 RRR. Lungs CTABL. Abd NTND, soft. Ext pulses 2+.    Plan:  - EKG  - s/p Maalox x1 with no relief  - Cardiac enzymes  - Pepcid 20mg x1    Juanpablo Casillas PA-C      ******************************  Addendum  ******************************    Pt reassessed and doing well. Cardiac enzymes negative. Pt was anxious prior to episode, Ativan 0.5mg PO x1 given, daughter states that she is resting and saying that she feels better. Primary care provider contacted and he states that psych referral should be discussed and he will talk to family per their preference for consultation with him.    Will continue to monitor.    Juanpablo Casillas PA-C

## 2019-09-10 NOTE — PHYSICAL THERAPY INITIAL EVALUATION ADULT - PERTINENT HX OF CURRENT PROBLEM, REHAB EVAL
Pt. admitted for chest pain. Per documentation, atypical chest pain likely in the setting of pleurisy from underlying pneumonia.

## 2019-09-11 DIAGNOSIS — E87.1 HYPO-OSMOLALITY AND HYPONATREMIA: ICD-10-CM

## 2019-09-11 LAB
ALBUMIN SERPL ELPH-MCNC: 4.1 G/DL — SIGNIFICANT CHANGE UP (ref 3.3–5)
ALP SERPL-CCNC: 85 U/L — SIGNIFICANT CHANGE UP (ref 40–120)
ALT FLD-CCNC: 42 U/L — HIGH (ref 4–33)
ANION GAP SERPL CALC-SCNC: 10 MMO/L — SIGNIFICANT CHANGE UP (ref 7–14)
ANION GAP SERPL CALC-SCNC: 14 MMO/L — SIGNIFICANT CHANGE UP (ref 7–14)
AST SERPL-CCNC: 34 U/L — HIGH (ref 4–32)
BACTERIA BLD CULT: SIGNIFICANT CHANGE UP
BASOPHILS # BLD AUTO: 0.04 K/UL — SIGNIFICANT CHANGE UP (ref 0–0.2)
BASOPHILS # BLD AUTO: 0.07 K/UL — SIGNIFICANT CHANGE UP (ref 0–0.2)
BASOPHILS NFR BLD AUTO: 0.3 % — SIGNIFICANT CHANGE UP (ref 0–2)
BASOPHILS NFR BLD AUTO: 0.5 % — SIGNIFICANT CHANGE UP (ref 0–2)
BILIRUB SERPL-MCNC: 0.5 MG/DL — SIGNIFICANT CHANGE UP (ref 0.2–1.2)
BUN SERPL-MCNC: 12 MG/DL — SIGNIFICANT CHANGE UP (ref 7–23)
BUN SERPL-MCNC: 14 MG/DL — SIGNIFICANT CHANGE UP (ref 7–23)
CALCIUM SERPL-MCNC: 8.2 MG/DL — LOW (ref 8.4–10.5)
CALCIUM SERPL-MCNC: 8.3 MG/DL — LOW (ref 8.4–10.5)
CHLORIDE SERPL-SCNC: 83 MMOL/L — LOW (ref 98–107)
CHLORIDE SERPL-SCNC: 87 MMOL/L — LOW (ref 98–107)
CHLORIDE UR-SCNC: 79 MMOL/L — SIGNIFICANT CHANGE UP
CO2 SERPL-SCNC: 22 MMOL/L — SIGNIFICANT CHANGE UP (ref 22–31)
CO2 SERPL-SCNC: 25 MMOL/L — SIGNIFICANT CHANGE UP (ref 22–31)
CREAT SERPL-MCNC: 0.69 MG/DL — SIGNIFICANT CHANGE UP (ref 0.5–1.3)
CREAT SERPL-MCNC: 0.69 MG/DL — SIGNIFICANT CHANGE UP (ref 0.5–1.3)
EOSINOPHIL # BLD AUTO: 0.15 K/UL — SIGNIFICANT CHANGE UP (ref 0–0.5)
EOSINOPHIL # BLD AUTO: 0.57 K/UL — HIGH (ref 0–0.5)
EOSINOPHIL NFR BLD AUTO: 1.3 % — SIGNIFICANT CHANGE UP (ref 0–6)
EOSINOPHIL NFR BLD AUTO: 4.4 % — SIGNIFICANT CHANGE UP (ref 0–6)
GLUCOSE BLDC GLUCOMTR-MCNC: 100 MG/DL — HIGH (ref 70–99)
GLUCOSE BLDC GLUCOMTR-MCNC: 105 MG/DL — HIGH (ref 70–99)
GLUCOSE BLDC GLUCOMTR-MCNC: 119 MG/DL — HIGH (ref 70–99)
GLUCOSE BLDC GLUCOMTR-MCNC: 147 MG/DL — HIGH (ref 70–99)
GLUCOSE SERPL-MCNC: 108 MG/DL — HIGH (ref 70–99)
GLUCOSE SERPL-MCNC: 91 MG/DL — SIGNIFICANT CHANGE UP (ref 70–99)
HCT VFR BLD CALC: 36.8 % — SIGNIFICANT CHANGE UP (ref 34.5–45)
HCT VFR BLD CALC: 38.6 % — SIGNIFICANT CHANGE UP (ref 34.5–45)
HGB BLD-MCNC: 11.5 G/DL — SIGNIFICANT CHANGE UP (ref 11.5–15.5)
HGB BLD-MCNC: 12.1 G/DL — SIGNIFICANT CHANGE UP (ref 11.5–15.5)
IMM GRANULOCYTES NFR BLD AUTO: 0.6 % — SIGNIFICANT CHANGE UP (ref 0–1.5)
IMM GRANULOCYTES NFR BLD AUTO: 0.7 % — SIGNIFICANT CHANGE UP (ref 0–1.5)
LYMPHOCYTES # BLD AUTO: 2.98 K/UL — SIGNIFICANT CHANGE UP (ref 1–3.3)
LYMPHOCYTES # BLD AUTO: 25.1 % — SIGNIFICANT CHANGE UP (ref 13–44)
LYMPHOCYTES # BLD AUTO: 3.88 K/UL — HIGH (ref 1–3.3)
LYMPHOCYTES # BLD AUTO: 30.3 % — SIGNIFICANT CHANGE UP (ref 13–44)
MAGNESIUM SERPL-MCNC: 2.2 MG/DL — SIGNIFICANT CHANGE UP (ref 1.6–2.6)
MCHC RBC-ENTMCNC: 23.1 PG — LOW (ref 27–34)
MCHC RBC-ENTMCNC: 23.1 PG — LOW (ref 27–34)
MCHC RBC-ENTMCNC: 31.3 % — LOW (ref 32–36)
MCHC RBC-ENTMCNC: 31.3 % — LOW (ref 32–36)
MCV RBC AUTO: 73.7 FL — LOW (ref 80–100)
MCV RBC AUTO: 74 FL — LOW (ref 80–100)
MONOCYTES # BLD AUTO: 0.92 K/UL — HIGH (ref 0–0.9)
MONOCYTES # BLD AUTO: 1.55 K/UL — HIGH (ref 0–0.9)
MONOCYTES NFR BLD AUTO: 12.1 % — SIGNIFICANT CHANGE UP (ref 2–14)
MONOCYTES NFR BLD AUTO: 7.8 % — SIGNIFICANT CHANGE UP (ref 2–14)
NEUTROPHILS # BLD AUTO: 6.66 K/UL — SIGNIFICANT CHANGE UP (ref 1.8–7.4)
NEUTROPHILS # BLD AUTO: 7.69 K/UL — HIGH (ref 1.8–7.4)
NEUTROPHILS NFR BLD AUTO: 52.1 % — SIGNIFICANT CHANGE UP (ref 43–77)
NEUTROPHILS NFR BLD AUTO: 64.8 % — SIGNIFICANT CHANGE UP (ref 43–77)
NRBC # FLD: 0 K/UL — SIGNIFICANT CHANGE UP (ref 0–0)
NRBC # FLD: 0.03 K/UL — SIGNIFICANT CHANGE UP (ref 0–0)
OSMOLALITY SERPL: 258 MOSMO/KG — LOW (ref 275–295)
OSMOLALITY UR: 411 MOSMO/KG — SIGNIFICANT CHANGE UP (ref 50–1200)
PHOSPHATE SERPL-MCNC: 3.5 MG/DL — SIGNIFICANT CHANGE UP (ref 2.5–4.5)
PLATELET # BLD AUTO: 277 K/UL — SIGNIFICANT CHANGE UP (ref 150–400)
PLATELET # BLD AUTO: 278 K/UL — SIGNIFICANT CHANGE UP (ref 150–400)
PMV BLD: 10.1 FL — SIGNIFICANT CHANGE UP (ref 7–13)
PMV BLD: 10.5 FL — SIGNIFICANT CHANGE UP (ref 7–13)
POTASSIUM SERPL-MCNC: 4.1 MMOL/L — SIGNIFICANT CHANGE UP (ref 3.5–5.3)
POTASSIUM SERPL-MCNC: 4.6 MMOL/L — SIGNIFICANT CHANGE UP (ref 3.5–5.3)
POTASSIUM SERPL-SCNC: 4.1 MMOL/L — SIGNIFICANT CHANGE UP (ref 3.5–5.3)
POTASSIUM SERPL-SCNC: 4.6 MMOL/L — SIGNIFICANT CHANGE UP (ref 3.5–5.3)
POTASSIUM UR-SCNC: 37 MMOL/L — SIGNIFICANT CHANGE UP
PROT SERPL-MCNC: 7 G/DL — SIGNIFICANT CHANGE UP (ref 6–8.3)
RBC # BLD: 4.97 M/UL — SIGNIFICANT CHANGE UP (ref 3.8–5.2)
RBC # BLD: 5.24 M/UL — HIGH (ref 3.8–5.2)
RBC # FLD: 17.2 % — HIGH (ref 10.3–14.5)
RBC # FLD: 17.2 % — HIGH (ref 10.3–14.5)
SODIUM SERPL-SCNC: 119 MMOL/L — CRITICAL LOW (ref 135–145)
SODIUM SERPL-SCNC: 122 MMOL/L — LOW (ref 135–145)
SODIUM UR-SCNC: 96 MMOL/L — SIGNIFICANT CHANGE UP
TSH SERPL-MCNC: 2.4 UIU/ML — SIGNIFICANT CHANGE UP (ref 0.27–4.2)
URATE SERPL-MCNC: 3.5 MG/DL — SIGNIFICANT CHANGE UP (ref 2.5–7)
WBC # BLD: 11.86 K/UL — HIGH (ref 3.8–10.5)
WBC # BLD: 12.81 K/UL — HIGH (ref 3.8–10.5)
WBC # FLD AUTO: 11.86 K/UL — HIGH (ref 3.8–10.5)
WBC # FLD AUTO: 12.81 K/UL — HIGH (ref 3.8–10.5)

## 2019-09-11 RX ORDER — SODIUM CHLORIDE 9 MG/ML
1000 INJECTION INTRAMUSCULAR; INTRAVENOUS; SUBCUTANEOUS
Refills: 0 | Status: DISCONTINUED | OUTPATIENT
Start: 2019-09-11 | End: 2019-09-13

## 2019-09-11 RX ORDER — SODIUM CHLORIDE 9 MG/ML
500 INJECTION INTRAMUSCULAR; INTRAVENOUS; SUBCUTANEOUS ONCE
Refills: 0 | Status: COMPLETED | OUTPATIENT
Start: 2019-09-11 | End: 2019-09-11

## 2019-09-11 RX ADMIN — ATORVASTATIN CALCIUM 20 MILLIGRAM(S): 80 TABLET, FILM COATED ORAL at 20:57

## 2019-09-11 RX ADMIN — AMLODIPINE BESYLATE 2.5 MILLIGRAM(S): 2.5 TABLET ORAL at 05:36

## 2019-09-11 RX ADMIN — CITALOPRAM 20 MILLIGRAM(S): 10 TABLET, FILM COATED ORAL at 12:30

## 2019-09-11 RX ADMIN — Medication 25 MILLIGRAM(S): at 05:36

## 2019-09-11 RX ADMIN — Medication 75 MICROGRAM(S): at 05:36

## 2019-09-11 RX ADMIN — CLOPIDOGREL BISULFATE 75 MILLIGRAM(S): 75 TABLET, FILM COATED ORAL at 12:30

## 2019-09-11 RX ADMIN — SODIUM CHLORIDE 1000 MILLILITER(S): 9 INJECTION INTRAMUSCULAR; INTRAVENOUS; SUBCUTANEOUS at 13:00

## 2019-09-11 RX ADMIN — ENOXAPARIN SODIUM 40 MILLIGRAM(S): 100 INJECTION SUBCUTANEOUS at 18:48

## 2019-09-11 RX ADMIN — PANTOPRAZOLE SODIUM 40 MILLIGRAM(S): 20 TABLET, DELAYED RELEASE ORAL at 05:36

## 2019-09-11 RX ADMIN — SODIUM CHLORIDE 60 MILLILITER(S): 9 INJECTION INTRAMUSCULAR; INTRAVENOUS; SUBCUTANEOUS at 16:26

## 2019-09-11 RX ADMIN — Medication 25 MILLIGRAM(S): at 18:48

## 2019-09-11 NOTE — PROGRESS NOTE ADULT - ASSESSMENT
· Assessment	  83 y/o Sam speaking female with a PMHx of CAD S/P stent placement, VF S/P life vest, HTN, HLD, pre-DM, GERD, and hypothyroidism presents to ED with pleuritic right sided chest pain in the setting of right lower lobe pneumonia.      Hyponatremia:  Nephro eval noted.  BMP  IVF  Dw family.

## 2019-09-11 NOTE — DIETITIAN INITIAL EVALUATION ADULT. - OTHER INFO
84/yo admitted with the DX chest pain, CAD, HTN with decreased po, denies any nausea, vomiting and diarrhea at this time. Pt not certain about weight loss or UBW. Labs reviewed, considering decreased po recommend to add snacks to current diet. Food options discussed, no food requests at this time, pt prefers food from home, amenable to try oatmeal with Glucerna shake. Detailed nutrition education provided in [facility]. Recommend small frequent meals and provide encouragement at all meals.

## 2019-09-11 NOTE — CHART NOTE - NSCHARTNOTEFT_GEN_A_CORE
writer spoke to attending regarding hyponatremia - awaiting nephrology Dr Razo for consult/evaluation - will follow up

## 2019-09-11 NOTE — PROGRESS NOTE ADULT - SUBJECTIVE AND OBJECTIVE BOX
Patient is a 84y old  Female who presents with a chief complaint of Chest pain (10 Sep 2019 19:36)      Any change in ROS: feelsing pretty weak : her NA is pretty low     MEDICATIONS  (STANDING):  amLODIPine   Tablet 2.5 milliGRAM(s) Oral daily  atorvastatin 20 milliGRAM(s) Oral at bedtime  citalopram 20 milliGRAM(s) Oral daily  clopidogrel Tablet 75 milliGRAM(s) Oral daily  dextrose 5%. 1000 milliLiter(s) (50 mL/Hr) IV Continuous <Continuous>  dextrose 50% Injectable 12.5 Gram(s) IV Push once  dextrose 50% Injectable 25 Gram(s) IV Push once  dextrose 50% Injectable 25 Gram(s) IV Push once  enoxaparin Injectable 40 milliGRAM(s) SubCutaneous daily  influenza   Vaccine 0.5 milliLiter(s) IntraMuscular once  insulin lispro (HumaLOG) corrective regimen sliding scale   SubCutaneous three times a day before meals  insulin lispro (HumaLOG) corrective regimen sliding scale   SubCutaneous at bedtime  levothyroxine 75 MICROGram(s) Oral daily  metoprolol tartrate 25 milliGRAM(s) Oral two times a day  pantoprazole    Tablet 40 milliGRAM(s) Oral before breakfast  ranolazine 500 milliGRAM(s) Oral two times a day    MEDICATIONS  (PRN):  acetaminophen   Tablet .. 650 milliGRAM(s) Oral every 6 hours PRN Temp greater or equal to 38C (100.4F), Mild Pain (1 - 3), Moderate Pain (4 - 6)  aluminum hydroxide/magnesium hydroxide/simethicone Suspension 30 milliLiter(s) Oral every 6 hours PRN Dyspepsia  dextrose 40% Gel 15 Gram(s) Oral once PRN Blood Glucose LESS THAN 70 milliGRAM(s)/deciliter  glucagon  Injectable 1 milliGRAM(s) IntraMuscular once PRN Glucose LESS THAN 70 milligrams/deciliter    Vital Signs Last 24 Hrs  T(C): 36.4 (11 Sep 2019 12:08), Max: 36.9 (10 Sep 2019 17:01)  T(F): 97.6 (11 Sep 2019 12:08), Max: 98.4 (10 Sep 2019 17:01)  HR: 66 (11 Sep 2019 12:08) (63 - 88)  BP: 147/64 (11 Sep 2019 12:08) (140/80 - 166/83)  BP(mean): --  RR: 18 (11 Sep 2019 12:08) (18 - 18)  SpO2: 98% (11 Sep 2019 12:08) (98% - 98%)    I&O's Summary        Physical Exam:   GENERAL: NAD, well-groomed, well-developed  HEENT: LEW/   Atraumatic, Normocephalic  ENMT: No tonsillar erythema, exudates, or enlargement; Moist mucous membranes, Good dentition, No lesions  NECK: Supple, No JVD, Normal thyroid  CHEST/LUNG: Clear to auscultaion  CVS: Regular rate and rhythm; No murmurs, rubs, or gallops  GI: : Soft, Nontender, Nondistended; Bowel sounds present  NERVOUS SYSTEM:  Alert & Oriented X3  EXTREMITIES:  2+ Peripheral Pulses, No clubbing, cyanosis, or edema  LYMPH: No lymphadenopathy noted  SKIN: No rashes or lesions  ENDOCRINOLOGY: No Thyromegaly  PSYCH: Appropriate    Labs:  28  CARDIAC MARKERS ( 10 Sep 2019 13:00 )  x     / x     / 99 u/L / 5.79 ng/mL / x                                11.5   12.81 )-----------( 278      ( 11 Sep 2019 06:18 )             36.8                         12.1   13.59 )-----------( 291      ( 10 Sep 2019 05:05 )             39.7                         11.7   10.83 )-----------( 258      ( 09 Sep 2019 06:04 )             37.7                         12.5   14.25 )-----------( 301      ( 08 Sep 2019 09:29 )             42.3     09-11    119<LL>  |  83<L>  |  14  ----------------------------<  91  4.6   |  22  |  0.69  09-10    124<L>  |  86<L>  |  12  ----------------------------<  87  4.9   |  25  |  0.76  09-09    125<L>  |  86<L>  |  13  ----------------------------<  131<H>  3.9   |  26  |  0.82  09-08    127<L>  |  87<L>  |  14  ----------------------------<  118<H>  4.3   |  28  |  1.09  09-08    129<L>  |  85<L>  |  14  ----------------------------<  121<H>  4.8   |  30  |  0.97    Ca    8.3<L>      11 Sep 2019 06:18  Ca    8.8      10 Sep 2019 05:05  Phos  3.5     09-11  Phos  3.7     09-10  Mg     2.2     09-11  Mg     2.2     09-10      CAPILLARY BLOOD GLUCOSE      POCT Blood Glucose.: 147 mg/dL (11 Sep 2019 11:44)  POCT Blood Glucose.: 105 mg/dL (11 Sep 2019 07:39)  POCT Blood Glucose.: 128 mg/dL (10 Sep 2019 21:39)  POCT Blood Glucose.: 224 mg/dL (10 Sep 2019 16:55)        < from: CT Angio Chest w/ IV Cont (09.08.19 @ 10:10) >  No pleural effusions or pneumothorax.    MEDIASTINUM: No mass or lymphadenopathy.    UPPER ABDOMEN: Cholecystectomy.    BONES AND SOFT TISSUES: No aggressive osseous lesion.    LOWER NECK: Within normal limits.      IMPRESSION:     1.  No pulmonary embolus.    2.  Cardiomegaly.      < end of copied text >            RECENT CULTURES:  09-08 @ 10:20 BLOOD VENOUS                  NO ORGANISMS ISOLATED  NO ORGANISMS ISOLATED AT 72 HRS.  09-06 @ 14:58 BLOOD PERIPHERAL   PCR      ***** CRITICAL RESULT *****  PERSON CALLED / READ-BACK:DESIREE ARNETT/MALINDA/Y  DATE / TIME CALLED: 09/07/19 1025  CALLED BY: SERJIO COCHRAN  GPCCL^Gram Pos Cocci In Clusters  AFTER: 16 HOURS INCUBATION  BOTTLE: AEROBIC BOTTLE  BLOOD CULTURE PCR  Staphylococcus sp.,coag neg  BLOOD CULTURE PCR  ***Blood Panel PCR results on this specimen are available  approximately 3 hours after the Gram stain result***  Gram stain, PCR, and/or culture results may not always  correspond due to difference in methodologies  ------------------------------------------------------------  This PCR assay was performed using [employer].  The  following targets are tested for:  Enterococcus, vancomycin  resistant enterococci, Listeria monocytogenes,  coagulase  negative staphylococci, S. aureus, methicillin resistant S.  aureus, Streptococcus agalactiae (Group B), S. pneumoniae,  S. pyogenes (Group A), Acinetobacter baumannii, Enterobacter  cloacae, E. coli, Klebsiella oxytoca, K. pneumoniae, Proteus  sp., Serratia marcescens, Haemophilus influenzae, Neisseria  meningitidis, Pseudomonas aeruginosa, Candida albicans, C.  glabrata, C. krusei, C. parapsilosis, C. tropicalis and the  KPC resistance gene.  **NOTE: Due to technical problems, Proteus sp. will NOT be  reported as part of the BCID panel until further notice.       ***Blood Panel PCR results on this specimen are available  approximately 3 hours after the Gram stain result***  Gram stain, PCR, and/or culture results may not always  correspond due to difference in methodologies  ------------------------------------------------------------  This PCR assay was performed using [employer].  The  following targets are tested for:  Enterococcus, vancomycin  resistant enterococci, Listeria monocytogenes,  coagulase  negative staphylococci, S. aureus, methicillin resistant S.  aureus, Streptococcus agalactiae (Group B), S. pneumoniae,  S. pyogenes (Group A), Acinetobacter baumannii, Enterobacter  cloacae, E. coli, Klebsiella oxytoca, K. pneumoniae, Proteus  sp., Serratia marcescens, Haemophilus influenzae, Neisseria  meningitidis, Pseudomonas aeruginosa, Candida albicans, C.  glabrata, C. krusei, C. parapsilosis, C. tropicalis and the  KPC resistance gene.  **NOTE: Due to technical problems, Proteus sp. will NOT be  reported as part of the BCID paneluntil further notice.        RESPIRATORY CULTURES:          Studies  Chest X-RAY  CT SCAN Chest   Venous Dopplers: LE:   CT Abdomen  Others

## 2019-09-11 NOTE — PROGRESS NOTE ADULT - PROBLEM SELECTOR PLAN 2
Her generalized symptoms could be related to hyponatremia: need to restrict feree water: nephrology to see

## 2019-09-11 NOTE — PROGRESS NOTE ADULT - SUBJECTIVE AND OBJECTIVE BOX
Patient is a 84y old  Female who presents with a chief complaint of Chest pain (08 Sep 2019 16:23)      SUBJECTIVE / OVERNIGHT EVENTS:    Events noted.  CONSTITUTIONAL: Uneasiness  RESPIRATORY: No cough, wheezing,  No shortness of breath  CARDIOVASCULAR: No chest pain, palpitations, dizziness, or leg swelling  GASTROINTESTINAL: No abdominal or epigastric pain. No nausea, vomiting.  NEUROLOGICAL: No headaches,     MEDICATIONS  (STANDING):  aspirin enteric coated 81 milliGRAM(s) Oral daily  atorvastatin 20 milliGRAM(s) Oral at bedtime  citalopram 20 milliGRAM(s) Oral daily  dextrose 5%. 1000 milliLiter(s) (50 mL/Hr) IV Continuous <Continuous>  dextrose 50% Injectable 12.5 Gram(s) IV Push once  dextrose 50% Injectable 25 Gram(s) IV Push once  dextrose 50% Injectable 25 Gram(s) IV Push once  enoxaparin Injectable 40 milliGRAM(s) SubCutaneous daily  furosemide    Tablet 20 milliGRAM(s) Oral daily  influenza   Vaccine 0.5 milliLiter(s) IntraMuscular once  insulin lispro (HumaLOG) corrective regimen sliding scale   SubCutaneous three times a day before meals  insulin lispro (HumaLOG) corrective regimen sliding scale   SubCutaneous at bedtime  levothyroxine 75 MICROGram(s) Oral daily  metoprolol tartrate 25 milliGRAM(s) Oral two times a day  pantoprazole    Tablet 40 milliGRAM(s) Oral before breakfast  ranolazine 500 milliGRAM(s) Oral two times a day  ticagrelor 60 milliGRAM(s) Oral two times a day    MEDICATIONS  (PRN):  acetaminophen   Tablet .. 650 milliGRAM(s) Oral every 6 hours PRN Temp greater or equal to 38C (100.4F), Mild Pain (1 - 3), Moderate Pain (4 - 6)  aluminum hydroxide/magnesium hydroxide/simethicone Suspension 30 milliLiter(s) Oral every 6 hours PRN Dyspepsia  dextrose 40% Gel 15 Gram(s) Oral once PRN Blood Glucose LESS THAN 70 milliGRAM(s)/deciliter  glucagon  Injectable 1 milliGRAM(s) IntraMuscular once PRN Glucose LESS THAN 70 milligrams/deciliter        CAPILLARY BLOOD GLUCOSE      POCT Blood Glucose.: 103 mg/dL (08 Sep 2019 16:46)  POCT Blood Glucose.: 133 mg/dL (08 Sep 2019 11:42)  POCT Blood Glucose.: 99 mg/dL (08 Sep 2019 07:33)  POCT Blood Glucose.: 113 mg/dL (08 Sep 2019 04:04)    I&O's Summary      PHYSICAL EXAM:  GENERAL: NAD  NECK: Supple, No JVD  CHEST/LUNG: Clear to auscultation bilaterally; No wheezing.  HEART: Regular rate and rhythm; No murmurs, rubs, or gallops  ABDOMEN: Soft, Nontender, Nondistended; Bowel sounds present  EXTREMITIES:   No edema  NEUROLOGY: AAO X 3      LABS:                        12.5   14.25 )-----------( 301      ( 08 Sep 2019 09:29 )             42.3     09-08    127<L>  |  87<L>  |  14  ----------------------------<  118<H>  4.3   |  28  |  1.09    Ca    8.9      08 Sep 2019 14:43  Phos  3.9     09-08  Mg     2.1     09-08    TPro  6.8  /  Alb  4.2  /  TBili  0.5  /  DBili  x   /  AST  15  /  ALT  13  /  AlkPhos  83  09-07            CAPILLARY BLOOD GLUCOSE      POCT Blood Glucose.: 103 mg/dL (08 Sep 2019 16:46)  POCT Blood Glucose.: 133 mg/dL (08 Sep 2019 11:42)  POCT Blood Glucose.: 99 mg/dL (08 Sep 2019 07:33)  POCT Blood Glucose.: 113 mg/dL (08 Sep 2019 04:04)    09-06 @ 14:58  Culture-urine --  Culture results --  method type PCR  Organism BLOOD CULTURE PCR  ***Blood Panel PCR results on this specimen are available  approximately 3 hours after the Gram stain result***  Gram stain, PCR, and/or culture results may not always  correspond due to difference in methodologies  ------------------------------------------------------------  This PCR assay was performed using [employer].  The  following targets are tested for:  Enterococcus, vancomycin  resistant enterococci, Listeria monocytogenes,  coagulase  negative staphylococci, S. aureus, methicillin resistant S.  aureus, Streptococcus agalactiae (Group B), S. pneumoniae,  S. pyogenes (Group A), Acinetobacter baumannii, Enterobacter  cloacae, E. coli, Klebsiella oxytoca, K. pneumoniae, Proteus  sp., Serratia marcescens, Haemophilus influenzae, Neisseria  meningitidis, Pseudomonas aeruginosa, Candida albicans, C.  glabrata, C. krusei, C. parapsilosis, C. tropicalis and the  KPC resistance gene.  **NOTE: Due to technical problems, Proteus sp. will NOT be  reported as part of the BCID panel until further notice.  Organism Identification BLOOD CULTURE PCR  Staphylococcus sp.,coag neg  Specimen source BLOOD PERIPHERAL           09-06 @ 14:58  Culture blood   ***Blood Panel PCR results on this specimen are available  approximately 3 hours after the Gram stain result***  Gram stain, PCR, and/or culture results may not always  correspond due to difference in methodologies  ------------------------------------------------------------  This PCR assay was performed using [employer].  The  following targets are tested for:  Enterococcus, vancomycin  resistant enterococci, Listeria monocytogenes,  coagulase  negative staphylococci, S. aureus, methicillin resistant S.  aureus, Streptococcus agalactiae (Group B), S. pneumoniae,  S. pyogenes (Group A), Acinetobacter baumannii, Enterobacter  cloacae, E. coli, Klebsiella oxytoca, K. pneumoniae, Proteus  sp., Serratia marcescens, Haemophilus influenzae, Neisseria  meningitidis, Pseudomonas aeruginosa, Candida albicans, C.  glabrata, C. krusei, C. parapsilosis, C. tropicalis and the  KPC resistance gene.  **NOTE: Due to technical problems, Proteus sp. will NOT be  reported as part of the BCID paneluntil further notice.  Culture results --  Gram stain --  Gram stain blood   ***** CRITICAL RESULT *****  PERSON CALLED / READ-BACK:DESIREE ARNETT/MALINDA/REGI  DATE / TIME CALLED: 09/07/19 1025  CALLED BY: SERJIO COCHRAN  GPCCL^Gram Pos Cocci In Clusters  AFTER: 16 HOURS INCUBATION  BOTTLE: AEROBIC BOTTLE  Method type PCR  Organism BLOOD CULTURE PCR  ***Blood Panel PCR results on this specimen are available  approximately 3 hours after the Gram stain result***  Gram stain, PCR, and/or culture results may not always  correspond due to difference in methodologies  ------------------------------------------------------------  This PCR assay was performed using [employer].  The  following targets are tested for:  Enterococcus, vancomycin  resistant enterococci, Listeria monocytogenes,  coagulase  negative staphylococci, S. aureus, methicillin resistant S.  aureus, Streptococcus agalactiae (Group B), S. pneumoniae,  S. pyogenes (Group A), Acinetobacter baumannii, Enterobacter  cloacae, E. coli, Klebsiella oxytoca, K. pneumoniae, Proteus  sp., Serratia marcescens, Haemophilus influenzae, Neisseria  meningitidis, Pseudomonas aeruginosa, Candida albicans, C.  glabrata, C. krusei, C. parapsilosis, C. tropicalis and the  KPC resistance gene.  **NOTE: Due to technical problems, Proteus sp. will NOT be  reported as part of the BCID panel until further notice.  Organism identification BLOOD CULTURE PCR  Staphylococcus sp.,coag neg  Specimen source BLOOD PERIPHERAL      RADIOLOGY & ADDITIONAL TESTS:    Imaging Personally Reviewed:    Consultant(s) Notes Reviewed:      Care Discussed with Consultants/Other Providers:

## 2019-09-11 NOTE — CHART NOTE - NSCHARTNOTEFT_GEN_A_CORE
patient cannot self propel in a standard wheelchair. patient cannot perform ADLs with use of walker, cane or crutches due to past medical history of CAD with stents ICD I25.10 and asthma ICD J45.901. Patient will use transport wheelchair to get to and from MD appointments and to move about in the home. Caregiver is willing to assist.    Thank you,  Madelin [facility] ACP

## 2019-09-11 NOTE — CONSULT NOTE ADULT - SUBJECTIVE AND OBJECTIVE BOX
HPI:  Ms. Peñaloza is an 84 year-old woman with history of multiple medical issues including hypertension, coronary artery disease, and esophageal tear s/p clipping. She presented on 9/6/19 with intermittent 10/10 nonexertional substernal chest pain for 1-2 weeks. She attested as well to shortness of breath, nausea, chills, fatigue, and diffuse weakness. On admission, her serum sodium was 136meq/L.       PAST MEDICAL & SURGICAL HISTORY:  HLD (hyperlipidemia)  HTN (hypertension)  Hypothyroidism  Esophageal tear: S/P clipping  DM (diabetes mellitus)  CAD (coronary artery disease): S/P stent placement  Asthma  S/P cholecystectomy  S/P coronary artery stent placement  S/P cataract extraction    Allergies  Lexapro (Unknown)  Remeron (Unknown)    SOCIAL HISTORY:  Denies ETOh,Smoking,     FAMILY HISTORY:  No pertinent family history in first degree relatives    REVIEW OF SYSTEMS:  CONSTITUTIONAL: (+)weakness, (+)fatigue, (+)chills  EYES/ENT: No visual changes;  No vertigo or throat pain   NECK: No pain or stiffness  RESPIRATORY: No cough, wheezing, hemoptysis; (+)SOB  CARDIOVASCULAR: (+)chest pain  GASTROINTESTINAL: (+)nausea  GENITOURINARY: No dysuria, frequency or hematuria  NEUROLOGICAL: No numbness or weakness  SKIN: No itching, burning, rashes, or lesions   All other review of systems is negative unless indicated above.    VITAL:  T(C): , Max: 36.9 (09-10-19 @ 17:01)  T(F): , Max: 98.4 (09-10-19 @ 17:01)  HR: 66 (09-11-19 @ 12:08)  BP: 147/64 (09-11-19 @ 12:08)  RR: 18 (09-11-19 @ 12:08)  SpO2: 98% (09-11-19 @ 12:08)    PHYSICAL EXAM:  Constitutional: NAD, Alert  HEENT: NCAT, MMM  Neck: Supple, No JVD  Respiratory: CTA-b/l  Cardiovascular: RRR s1s2, no m/r/g  Gastrointestinal: BS+, soft, NT/ND  Extremities: No peripheral edema b/l  Neurological: no focal deficits; strength grossly intact  Back: no CVAT b/l  Skin: No rashes, no nevi    LABS:                        11.5   12.81 )-----------( 278      ( 11 Sep 2019 06:18 )             36.8     Na(119)/K(4.6)/Cl(83)/HCO3(22)/BUN(14)/Cr(0.69)Glu(91)/Ca(8.3)/Mg(2.2)/PO4(3.5)    09-11 @ 06:18  Na(124)/K(4.9)/Cl(86)/HCO3(25)/BUN(12)/Cr(0.76)Glu(87)/Ca(8.8)/Mg(2.2)/PO4(3.7)    09-10 @ 05:05  Na(125)/K(3.9)/Cl(86)/HCO3(26)/BUN(13)/Cr(0.82)Glu(131)/Ca(8.8)/Mg(2.0)/PO4(3.4)    09-09 @ 06:04    Random Urine (09.09.19 @ 13:50)    Osmolality, Random Urine: 345 mosmo/kg    Sodium, Random Urine: 57: Reference range not established for this test mmol/L  serum chol 138    IMAGING:  < from: CT Angio Chest w/ IV Cont (09.08.19 @ 10:10) >  1.  No pulmonary embolus.  2.  Cardiomegaly.    ASSESSMENT:  (1)Hyponatremia - worsening - SIADH? The urine sodium and osmolality from 9/9/19 are suggestive of this; then again, she was receiving Lasix at that time, which could distort the results. She is on Celexa, which can cause SIADH, but she has been on this from prior to admission...unlikely that the SIADH from Celexa would suddenly develop while she is admitted at [facility]. At times she has been on Lasix and at times she has received isotonic IVF during this admission...regardless of what has been done the sodium has trended down. She was on Lasix at home...unlikely that the Lasix in itself would lead to hypovolemia-associated hyponatremia while at [facility]. Given the severity of the hyponatremia, either hypertonic IVF or Tolvaptan must be given. In terms of her diet, she is on a low-salt diet but not a fluid restriction; I would rather have her on a fluid restriction and no dietary salt limitation. We should be checking the sodium at least q4h until it trends above 120 - at that point, we can reduce frequency of bloodwork to q12h.    RECOMMEND:  (1)1L free water restriction  (2)No dietary salt restriction  (3)  (4)No diuretics for now  (5)BMP q4h until Na>120, then q12h  (6)Check serum: uric acid, TSH, osm  (7)Check urine: lytes, osm      Thank you for involving [facility] in this patient's care.    With warm regards,    Rolando Quintana MD   [facility]  [phone] HPI:  Ms. Peñaloza is an 84 year-old woman with history of multiple medical issues including hypertension, coronary artery disease, and esophageal tear s/p clipping. She presented on 9/6/19 with intermittent 10/10 nonexertional substernal chest pain for 1-2 weeks. She attested as well to shortness of breath, nausea, chills, fatigue, and diffuse weakness. On admission, her serum sodium was 136meq/L.     History  primarily provided by daughter, who serves as a [facility]  (and is able to provide most of the history of the patient's behalf, as the patient at present is not fully capable of providing history due to dementia/delirium). She tells me that the patient has hardly been eating or drinking anything over the past several days. She denies history of hyponatremia on the patient's behalf. She shares that the patient is still suffering from the exact same symptoms that she was experiencing on admission.    PAST MEDICAL & SURGICAL HISTORY:  HLD (hyperlipidemia)  HTN (hypertension)  Hypothyroidism  Esophageal tear: S/P clipping  DM (diabetes mellitus)  CAD (coronary artery disease): S/P stent placement  Asthma  S/P cholecystectomy  S/P coronary artery stent placement  S/P cataract extraction    Allergies  Lexapro (Unknown)  Remeron (Unknown)    SOCIAL HISTORY:  Denies ETOh,Smoking,     FAMILY HISTORY:  No pertinent family history in first degree relatives    REVIEW OF SYSTEMS:  CONSTITUTIONAL: (+)weakness, (+)fatigue, (+)chills  EYES/ENT: No visual changes;  No vertigo or throat pain   NECK: No pain or stiffness  RESPIRATORY: No cough, wheezing, hemoptysis; (+)SOB  CARDIOVASCULAR: (+)chest pain  GASTROINTESTINAL: (+)nausea  GENITOURINARY: No dysuria, frequency or hematuria  NEUROLOGY: (+)tingling/pinching of fingers/toes  SKIN: No itching, burning, rashes, or lesions   All other review of systems is negative unless indicated above.    VITAL:  T(C): , Max: 36.9 (09-10-19 @ 17:01)  T(F): , Max: 98.4 (09-10-19 @ 17:01)  HR: 66 (09-11-19 @ 12:08)  BP: 147/64 (09-11-19 @ 12:08)  RR: 18 (09-11-19 @ 12:08)  SpO2: 98% (09-11-19 @ 12:08)    PHYSICAL EXAM:  Constitutional: Frail, thin to cachectic, anxious, confused  HEENT: NCAT, DMM  Neck: Supple, No JVD  Respiratory: CTA-b/l  Cardiovascular: RRR s1s2, no m/r/g  Gastrointestinal: BS+, soft, NT/ND  Extremities: No peripheral edema b/l  Neurological: no focal deficits; strength grossly intact  Back: no CVAT b/l  Skin: No rashes, no nevi    LABS:                        11.5   12.81 )-----------( 278      ( 11 Sep 2019 06:18 )             36.8     Na(119)/K(4.6)/Cl(83)/HCO3(22)/BUN(14)/Cr(0.69)Glu(91)/Ca(8.3)/Mg(2.2)/PO4(3.5)    09-11 @ 06:18  Na(124)/K(4.9)/Cl(86)/HCO3(25)/BUN(12)/Cr(0.76)Glu(87)/Ca(8.8)/Mg(2.2)/PO4(3.7)    09-10 @ 05:05  Na(125)/K(3.9)/Cl(86)/HCO3(26)/BUN(13)/Cr(0.82)Glu(131)/Ca(8.8)/Mg(2.0)/PO4(3.4)    09-09 @ 06:04    Random Urine (09.09.19 @ 13:50)    Osmolality, Random Urine: 345 mosmo/kg    Sodium, Random Urine: 57: Reference range not established for this test mmol/L  serum chol 138    IMAGING:  < from: CT Angio Chest w/ IV Cont (09.08.19 @ 10:10) >  1.  No pulmonary embolus.  2.  Cardiomegaly.    ASSESSMENT:  (1)Hyponatremia - worsening as of this a.m., but improved (119==>122) s/p NS 500cc bolus. This appears to be hypovolemic hyponatremia, exacerbated by poor osmotic intake relative to free water intake. Not surprising that it is improving with isotonic IVF. There is no need for her to be on any salt restriction nor water restriction.    (2)pain/nausea/chills, etc - not hyponatremia-associated; she was having these symptoms when her sodium was normal.    RECOMMEND:  (1)No free water restriction  (2)No dietary salt restriction  (3)NS 60cc/h  (4)No diuretics for now  (5)BMP tonight and q12h after - goal to raise serum sodium by no more than 0.5meq/L/h  (6)Check serum: uric acid, TSH, osm  (7)Check urine: lytes, osm      Thank you for involving [facility] in this patient's care.    With warm regards,    Rolando Quintana MD   [facility]  [phone] HPI:  Ms. Peñaloza is an 84 year-old woman with history of multiple medical issues including hypertension, coronary artery disease, and esophageal tear s/p clipping. She presented on 9/6/19 with intermittent 10/10 nonexertional substernal chest pain for 1-2 weeks. She attested as well to shortness of breath, nausea, chills, fatigue, and diffuse weakness. On admission, her serum sodium was 136meq/L.     History  primarily provided by daughter, who serves as a [facility]  (and is able to provide most of the history of the patient's behalf, as the patient at present is not fully capable of providing history due to dementia/delirium). She tells me that the patient has hardly been eating or drinking anything over the past several days. She denies history of hyponatremia on the patient's behalf. She shares that the patient is still suffering from the exact same symptoms that she was experiencing on admission.    PAST MEDICAL & SURGICAL HISTORY:  HLD (hyperlipidemia)  HTN (hypertension)  Hypothyroidism  Esophageal tear: S/P clipping  DM (diabetes mellitus)  CAD (coronary artery disease): S/P stent placement  Asthma  S/P cholecystectomy  S/P coronary artery stent placement  S/P cataract extraction    Allergies  Lexapro (Unknown)  Remeron (Unknown)    SOCIAL HISTORY:  Denies ETOh,Smoking,     FAMILY HISTORY:  No pertinent family history in first degree relatives    REVIEW OF SYSTEMS:  CONSTITUTIONAL: (+)weakness, (+)fatigue, (+)chills  EYES/ENT: No visual changes;  No vertigo or throat pain   NECK: No pain or stiffness  RESPIRATORY: No cough, wheezing, hemoptysis; (+)SOB  CARDIOVASCULAR: (+)chest pain  GASTROINTESTINAL: (+)nausea  GENITOURINARY: No dysuria, frequency or hematuria  NEUROLOGY: (+)tingling/pinching of fingers/toes  SKIN: No itching, burning, rashes, or lesions   All other review of systems is negative unless indicated above.    VITAL:  T(C): , Max: 36.9 (09-10-19 @ 17:01)  T(F): , Max: 98.4 (09-10-19 @ 17:01)  HR: 66 (09-11-19 @ 12:08)  BP: 147/64 (09-11-19 @ 12:08)  RR: 18 (09-11-19 @ 12:08)  SpO2: 98% (09-11-19 @ 12:08)    PHYSICAL EXAM:  Constitutional: Frail, thin to cachectic, anxious, confused  HEENT: NCAT, DMM  Neck: Supple, No JVD  Respiratory: CTA-b/l  Cardiovascular: RRR s1s2, no m/r/g  Gastrointestinal: BS+, soft, NT/ND  Extremities: No peripheral edema b/l  Neurological: no focal deficits; strength grossly intact  Back: no CVAT b/l  Skin: No rashes, no nevi    LABS:                        11.5   12.81 )-----------( 278      ( 11 Sep 2019 06:18 )             36.8     Na(119)/K(4.6)/Cl(83)/HCO3(22)/BUN(14)/Cr(0.69)Glu(91)/Ca(8.3)/Mg(2.2)/PO4(3.5)    09-11 @ 06:18  Na(124)/K(4.9)/Cl(86)/HCO3(25)/BUN(12)/Cr(0.76)Glu(87)/Ca(8.8)/Mg(2.2)/PO4(3.7)    09-10 @ 05:05  Na(125)/K(3.9)/Cl(86)/HCO3(26)/BUN(13)/Cr(0.82)Glu(131)/Ca(8.8)/Mg(2.0)/PO4(3.4)    09-09 @ 06:04    Random Urine (09.09.19 @ 13:50)    Osmolality, Random Urine: 345 mosmo/kg    Sodium, Random Urine: 57: Reference range not established for this test mmol/L  serum chol 138    IMAGING:  < from: CT Angio Chest w/ IV Cont (09.08.19 @ 10:10) >  1.  No pulmonary embolus.  2.  Cardiomegaly.    ASSESSMENT:  (1)Hyponatremia - worsening as of this a.m., but improved (119==>122) s/p NS 500cc bolus. This appears to be hypovolemic hyponatremia, exacerbated by poor osmotic intake relative to free water intake. Not surprising that it is improving with isotonic IVF. There is no need for her to be on any salt restriction nor water restriction. I see an order for standing free water, 1000cc at meals/bedtime...was she receiving this?    (2)pain/nausea/chills, etc - not hyponatremia-associated; she was having these symptoms when her sodium was normal.    RECOMMEND:  (1)No free water restriction; d/c the order for free water  (2)No dietary salt restriction  (3)NS 60cc/h  (4)No diuretics for now  (5)BMP tonight and q12h after - goal to raise serum sodium by no more than 0.5meq/L/h  (6)Check serum: uric acid, TSH, osm  (7)Check urine: lytes, osm      Thank you for involving [facility] in this patient's care.    With warm regards,    Rolando Quintana MD   [facility]  [phone]

## 2019-09-12 LAB
ALBUMIN SERPL ELPH-MCNC: 4 G/DL — SIGNIFICANT CHANGE UP (ref 3.3–5)
ALP SERPL-CCNC: 85 U/L — SIGNIFICANT CHANGE UP (ref 40–120)
ALT FLD-CCNC: 35 U/L — HIGH (ref 4–33)
ANION GAP SERPL CALC-SCNC: 13 MMO/L — SIGNIFICANT CHANGE UP (ref 7–14)
APPEARANCE UR: CLEAR — SIGNIFICANT CHANGE UP
AST SERPL-CCNC: 27 U/L — SIGNIFICANT CHANGE UP (ref 4–32)
BASOPHILS # BLD AUTO: 0.05 K/UL — SIGNIFICANT CHANGE UP (ref 0–0.2)
BASOPHILS NFR BLD AUTO: 0.5 % — SIGNIFICANT CHANGE UP (ref 0–2)
BILIRUB SERPL-MCNC: 0.6 MG/DL — SIGNIFICANT CHANGE UP (ref 0.2–1.2)
BILIRUB UR-MCNC: NEGATIVE — SIGNIFICANT CHANGE UP
BLOOD UR QL VISUAL: NEGATIVE — SIGNIFICANT CHANGE UP
BUN SERPL-MCNC: 10 MG/DL — SIGNIFICANT CHANGE UP (ref 7–23)
CALCIUM SERPL-MCNC: 8.2 MG/DL — LOW (ref 8.4–10.5)
CHLORIDE SERPL-SCNC: 88 MMOL/L — LOW (ref 98–107)
CO2 SERPL-SCNC: 23 MMOL/L — SIGNIFICANT CHANGE UP (ref 22–31)
COLOR SPEC: SIGNIFICANT CHANGE UP
CREAT SERPL-MCNC: 0.66 MG/DL — SIGNIFICANT CHANGE UP (ref 0.5–1.3)
EOSINOPHIL # BLD AUTO: 0.3 K/UL — SIGNIFICANT CHANGE UP (ref 0–0.5)
EOSINOPHIL NFR BLD AUTO: 2.8 % — SIGNIFICANT CHANGE UP (ref 0–6)
GLUCOSE BLDC GLUCOMTR-MCNC: 156 MG/DL — HIGH (ref 70–99)
GLUCOSE BLDC GLUCOMTR-MCNC: 164 MG/DL — HIGH (ref 70–99)
GLUCOSE BLDC GLUCOMTR-MCNC: 92 MG/DL — SIGNIFICANT CHANGE UP (ref 70–99)
GLUCOSE BLDC GLUCOMTR-MCNC: 93 MG/DL — SIGNIFICANT CHANGE UP (ref 70–99)
GLUCOSE SERPL-MCNC: 88 MG/DL — SIGNIFICANT CHANGE UP (ref 70–99)
GLUCOSE UR-MCNC: NEGATIVE — SIGNIFICANT CHANGE UP
HCT VFR BLD CALC: 40.2 % — SIGNIFICANT CHANGE UP (ref 34.5–45)
HGB BLD-MCNC: 12.5 G/DL — SIGNIFICANT CHANGE UP (ref 11.5–15.5)
IMM GRANULOCYTES NFR BLD AUTO: 0.8 % — SIGNIFICANT CHANGE UP (ref 0–1.5)
KETONES UR-MCNC: NEGATIVE — SIGNIFICANT CHANGE UP
LEUKOCYTE ESTERASE UR-ACNC: NEGATIVE — SIGNIFICANT CHANGE UP
LYMPHOCYTES # BLD AUTO: 3.5 K/UL — HIGH (ref 1–3.3)
LYMPHOCYTES # BLD AUTO: 32.7 % — SIGNIFICANT CHANGE UP (ref 13–44)
MCHC RBC-ENTMCNC: 23.2 PG — LOW (ref 27–34)
MCHC RBC-ENTMCNC: 31.1 % — LOW (ref 32–36)
MCV RBC AUTO: 74.7 FL — LOW (ref 80–100)
MONOCYTES # BLD AUTO: 1.07 K/UL — HIGH (ref 0–0.9)
MONOCYTES NFR BLD AUTO: 10 % — SIGNIFICANT CHANGE UP (ref 2–14)
NEUTROPHILS # BLD AUTO: 5.7 K/UL — SIGNIFICANT CHANGE UP (ref 1.8–7.4)
NEUTROPHILS NFR BLD AUTO: 53.2 % — SIGNIFICANT CHANGE UP (ref 43–77)
NITRITE UR-MCNC: NEGATIVE — SIGNIFICANT CHANGE UP
NRBC # FLD: 0 K/UL — SIGNIFICANT CHANGE UP (ref 0–0)
PH UR: 6 — SIGNIFICANT CHANGE UP (ref 5–8)
PLATELET # BLD AUTO: 280 K/UL — SIGNIFICANT CHANGE UP (ref 150–400)
PMV BLD: 10.3 FL — SIGNIFICANT CHANGE UP (ref 7–13)
POTASSIUM SERPL-MCNC: 4.2 MMOL/L — SIGNIFICANT CHANGE UP (ref 3.5–5.3)
POTASSIUM SERPL-SCNC: 4.2 MMOL/L — SIGNIFICANT CHANGE UP (ref 3.5–5.3)
PROT SERPL-MCNC: 6.8 G/DL — SIGNIFICANT CHANGE UP (ref 6–8.3)
PROT UR-MCNC: NEGATIVE — SIGNIFICANT CHANGE UP
RBC # BLD: 5.38 M/UL — HIGH (ref 3.8–5.2)
RBC # FLD: 17.6 % — HIGH (ref 10.3–14.5)
SODIUM SERPL-SCNC: 124 MMOL/L — LOW (ref 135–145)
SP GR SPEC: 1.01 — SIGNIFICANT CHANGE UP (ref 1–1.04)
UROBILINOGEN FLD QL: NORMAL — SIGNIFICANT CHANGE UP
WBC # BLD: 10.71 K/UL — HIGH (ref 3.8–10.5)
WBC # FLD AUTO: 10.71 K/UL — HIGH (ref 3.8–10.5)

## 2019-09-12 RX ADMIN — Medication 1: at 17:01

## 2019-09-12 RX ADMIN — Medication 25 MILLIGRAM(S): at 17:07

## 2019-09-12 RX ADMIN — CLOPIDOGREL BISULFATE 75 MILLIGRAM(S): 75 TABLET, FILM COATED ORAL at 12:06

## 2019-09-12 RX ADMIN — Medication 25 MILLIGRAM(S): at 06:17

## 2019-09-12 RX ADMIN — Medication 75 MICROGRAM(S): at 12:06

## 2019-09-12 RX ADMIN — ATORVASTATIN CALCIUM 20 MILLIGRAM(S): 80 TABLET, FILM COATED ORAL at 23:03

## 2019-09-12 RX ADMIN — ENOXAPARIN SODIUM 40 MILLIGRAM(S): 100 INJECTION SUBCUTANEOUS at 17:07

## 2019-09-12 RX ADMIN — AMLODIPINE BESYLATE 2.5 MILLIGRAM(S): 2.5 TABLET ORAL at 06:17

## 2019-09-12 NOTE — PROGRESS NOTE ADULT - PROBLEM SELECTOR PLAN 1
per PMD  9/10: Ganesh dced: check TFT  9/11: doing ok  no SOB  9/12: seems to be doing ok breathing wise:

## 2019-09-12 NOTE — PROGRESS NOTE ADULT - PROBLEM SELECTOR PLAN 3
no pneumonia or PE : has pulm nodule which has been stable: > reason for fever: has contaminated positive blood clutres: pts family wants IDc onsult  9/9: seesm to be doing ok : abiotics have been dced: no SOB : no cough  9/10: breathing wise OK: off antibtiocs!  9/11: reslved  9/12: resolved: last ct with no infiltrates

## 2019-09-12 NOTE — PROGRESS NOTE ADULT - PROBLEM SELECTOR PLAN 2
Her generalized symptoms could be related to hyponatremia: need to restrict feree water: nephrology to see  /12: still pretty hyponatremic : on IV fluids:

## 2019-09-12 NOTE — PROGRESS NOTE ADULT - SUBJECTIVE AND OBJECTIVE BOX
Patient is a 84y old  Female who presents with a chief complaint of Chest pain (12 Sep 2019 09:40)      Any change in ROS: Feeling weak:     MEDICATIONS  (STANDING):  amLODIPine   Tablet 2.5 milliGRAM(s) Oral daily  atorvastatin 20 milliGRAM(s) Oral at bedtime  citalopram 20 milliGRAM(s) Oral daily  clopidogrel Tablet 75 milliGRAM(s) Oral daily  dextrose 5%. 1000 milliLiter(s) (50 mL/Hr) IV Continuous <Continuous>  dextrose 50% Injectable 12.5 Gram(s) IV Push once  dextrose 50% Injectable 25 Gram(s) IV Push once  dextrose 50% Injectable 25 Gram(s) IV Push once  enoxaparin Injectable 40 milliGRAM(s) SubCutaneous daily  influenza   Vaccine 0.5 milliLiter(s) IntraMuscular once  insulin lispro (HumaLOG) corrective regimen sliding scale   SubCutaneous three times a day before meals  insulin lispro (HumaLOG) corrective regimen sliding scale   SubCutaneous at bedtime  levothyroxine 75 MICROGram(s) Oral daily  metoprolol tartrate 25 milliGRAM(s) Oral two times a day  pantoprazole    Tablet 40 milliGRAM(s) Oral before breakfast  ranolazine 500 milliGRAM(s) Oral two times a day  sodium chloride 0.9%. 1000 milliLiter(s) (60 mL/Hr) IV Continuous <Continuous>    MEDICATIONS  (PRN):  acetaminophen   Tablet .. 650 milliGRAM(s) Oral every 6 hours PRN Temp greater or equal to 38C (100.4F), Mild Pain (1 - 3), Moderate Pain (4 - 6)  aluminum hydroxide/magnesium hydroxide/simethicone Suspension 30 milliLiter(s) Oral every 6 hours PRN Dyspepsia  dextrose 40% Gel 15 Gram(s) Oral once PRN Blood Glucose LESS THAN 70 milliGRAM(s)/deciliter  glucagon  Injectable 1 milliGRAM(s) IntraMuscular once PRN Glucose LESS THAN 70 milligrams/deciliter    Vital Signs Last 24 Hrs  T(C): 36.6 (12 Sep 2019 06:11), Max: 36.6 (12 Sep 2019 06:11)  T(F): 97.8 (12 Sep 2019 06:11), Max: 97.8 (12 Sep 2019 06:11)  HR: 67 (12 Sep 2019 06:11) (66 - 77)  BP: 138/68 (12 Sep 2019 06:11) (138/68 - 155/77)  BP(mean): --  RR: 18 (12 Sep 2019 06:11) (16 - 18)  SpO2: 98% (12 Sep 2019 06:11) (98% - 99%)    I&O's Summary        Physical Exam:   GENERAL: NAD, well-groomed, well-developed  HEENT: LEW/   Atraumatic, Normocephalic  ENMT: No tonsillar erythema, exudates, or enlargement; Moist mucous membranes, Good dentition, No lesions  NECK: Supple, No JVD, Normal thyroid  CHEST/LUNG: Clear to auscultaion,  CVS: Regular rate and rhythm; No murmurs, rubs, or gallops  GI: : Soft, Nontender, Nondistended; Bowel sounds present  NERVOUS SYSTEM:  Alert & Oriented X3  EXTREMITIES:  2+ Peripheral Pulses, No clubbing, cyanosis, or edema  LYMPH: No lymphadenopathy noted  SKIN: No rashes or lesions  ENDOCRINOLOGY: No Thyromegaly  PSYCH: Appropriate    Labs:  28  CARDIAC MARKERS ( 10 Sep 2019 13:00 )  x     / x     / 99 u/L / 5.79 ng/mL / x                                12.5   10.71 )-----------( 280      ( 12 Sep 2019 06:45 )             40.2                         12.1   11.86 )-----------( 277      ( 11 Sep 2019 14:39 )             38.6                         11.5   12.81 )-----------( 278      ( 11 Sep 2019 06:18 )             36.8                         12.1   13.59 )-----------( 291      ( 10 Sep 2019 05:05 )             39.7                         11.7   10.83 )-----------( 258      ( 09 Sep 2019 06:04 )             37.7     09-12    124<L>  |  88<L>  |  10  ----------------------------<  88  4.2   |  23  |  0.66  09-11    122<L>  |  87<L>  |  12  ----------------------------<  108<H>  4.1   |  25  |  0.69  09-11    119<LL>  |  83<L>  |  14  ----------------------------<  91  4.6   |  22  |  0.69  09-10    124<L>  |  86<L>  |  12  ----------------------------<  87  4.9   |  25  |  0.76  09-09    125<L>  |  86<L>  |  13  ----------------------------<  131<H>  3.9   |  26  |  0.82  09-08    127<L>  |  87<L>  |  14  ----------------------------<  118<H>  4.3   |  28  |  1.09    Ca    8.2<L>      12 Sep 2019 06:30  Ca    8.2<L>      11 Sep 2019 14:39  Ca    8.3<L>      11 Sep 2019 06:18  Phos  3.5     09-11  Mg     2.2     09-11    TPro  6.8  /  Alb  4.0  /  TBili  0.6  /  DBili  x   /  AST  27  /  ALT  35<H>  /  AlkPhos  85  09-12  TPro  7.0  /  Alb  4.1  /  TBili  0.5  /  DBili  x   /  AST  34<H>  /  ALT  42<H>  /  AlkPhos  85  09-11    CAPILLARY BLOOD GLUCOSE      POCT Blood Glucose.: 92 mg/dL (12 Sep 2019 07:54)  POCT Blood Glucose.: 100 mg/dL (11 Sep 2019 20:57)  POCT Blood Glucose.: 119 mg/dL (11 Sep 2019 16:59)      LIVER FUNCTIONS - ( 12 Sep 2019 06:30 )  Alb: 4.0 g/dL / Pro: 6.8 g/dL / ALK PHOS: 85 u/L / ALT: 35 u/L / AST: 27 u/L / GGT: x               < from: CT Angio Chest w/ IV Cont (09.08.19 @ 10:10) >  contrast into the hepatic veins may represent right heart failure. The   left ventricle is dilated. No pericardial effusion.     No aortic aneurysm or dissection.    LUNGS, AIRWAYS, PLEURA: Patent central airways.     Bandlike scarring within the bilateral apices are unchanged. No pulmonary   edema. Stable 7 mm nodule anterior left upper lobe (series 2 image 43).     Cluster of 5 mm and smaller nodules within the right lower lobe likely   representing bronchiolitis or impacted distal airways. Mucoid impaction   and subsegmental atelectasis within the lingula.     No pleural effusions or pneumothorax.    MEDIASTINUM: No mass or lymphadenopathy.    UPPER ABDOMEN: Cholecystectomy.    BONES AND SOFT TISSUES: No aggressive osseous lesion.    LOWER NECK: Within normal limits.      IMPRESSION:     1.  No pulmonary embolus.    2.  Cardiomegaly.      < end of copied text >        RECENT CULTURES:  09-08 @ 10:20 BLOOD VENOUS                  NO ORGANISMS ISOLATED  NO ORGANISMS ISOLATED AT 96 HOURS  09-06 @ 14:58 BLOOD PERIPHERAL   PCR      ***** CRITICAL RESULT *****  PERSON CALLED / READ-BACK:DESIREE ARNETT/MALINDA/Y  DATE / TIME CALLED: 09/07/19 1025  CALLED BY: SERJIO COCHRAN  GPCCL^Gram Pos Cocci In Clusters  AFTER: 16 HOURS INCUBATION  BOTTLE: AEROBIC BOTTLE  BLOOD CULTURE PCR  Staphylococcus sp.,coag neg  BLOOD CULTURE PCR  ***Blood Panel PCR results on this specimen are available  approximately 3 hours after the Gram stain result***  Gram stain, PCR, and/or culture results may not always  correspond due to difference in methodologies  ------------------------------------------------------------  This PCR assay was performed using [employer].  The  following targets are tested for:  Enterococcus, vancomycin  resistant enterococci, Listeria monocytogenes,  coagulase  negative staphylococci, S. aureus, methicillin resistant S.  aureus, Streptococcus agalactiae (Group B), S. pneumoniae,  S. pyogenes (Group A), Acinetobacter baumannii, Enterobacter  cloacae, E. coli, Klebsiella oxytoca, K. pneumoniae, Proteus  sp., Serratia marcescens, Haemophilus influenzae, Neisseria  meningitidis, Pseudomonas aeruginosa, Candida albicans, C.  glabrata, C. krusei, C. parapsilosis, C. tropicalis and the  KPC resistance gene.  **NOTE: Due to technical problems, Proteus sp. will NOT be  reported as part of the BCID panel until further notice.       ***Blood Panel PCR results on this specimen are available  approximately 3 hours after the Gram stain result***  Gram stain, PCR, and/or culture results may not always  correspond due to difference in methodologies  ------------------------------------------------------------  This PCR assay was performed using [employer].  The  following targets are tested for:  Enterococcus, vancomycin  resistant enterococci, Listeria monocytogenes,  coagulase  negative staphylococci, S. aureus, methicillin resistant S.  aureus, Streptococcus agalactiae (Group B), S. pneumoniae,  S. pyogenes (Group A), Acinetobacter baumannii, Enterobacter  cloacae, E. coli, Klebsiella oxytoca, K. pneumoniae, Proteus  sp., Serratia marcescens, Haemophilus influenzae, Neisseria  meningitidis, Pseudomonas aeruginosa, Candida albicans, C.  glabrata, C. krusei, C. parapsilosis, C. tropicalis and the  KPC resistance gene.  **NOTE: Due to technical problems, Proteus sp. will NOT be  reported as part of the BCID paneluntil further notice.        RESPIRATORY CULTURES:          Studies  Chest X-RAY  CT SCAN Chest   Venous Dopplers: LE:   CT Abdomen  Others

## 2019-09-12 NOTE — PROGRESS NOTE ADULT - ASSESSMENT
83 y/o Sam speaking female with CAD S/P MI and LAD and RCA stent placement, VF S/P life vest, HTN, HLD, pre-DM, GERD, and hypothyroidism presents to ED with chest pain for the past couple weeks.    FTT  Hyponatremia (hypovolemic)  Chest pain due to Pneumonia  Epigastric pain due to Gastritis/ GERD    recent NST in my office 2 weeks ago negative for ischemia    rec:   discontinued asa  stop Ranexa --no angina  started clopidogrel 75mg daily (off Brilinta)  continue metoprolol 25mg BID, atorvastatin 20mg qhs  PPI and Maalox  off antibiotics  stopped lasix as pt with poor oral intake  added amlodipine 2.5mg daily for HTN    pt stable from cardiac pt of view for discharge planning  may ffup in my office in 1-2 weeks    d/w pt and son/daughter

## 2019-09-12 NOTE — PROGRESS NOTE ADULT - SUBJECTIVE AND OBJECTIVE BOX
S/  Pt with generalized weakness  No chest pain    ROS: +epigastric discomfort; no fever    MEDICATIONS  (STANDING):  amLODIPine   Tablet 2.5 milliGRAM(s) Oral daily  atorvastatin 20 milliGRAM(s) Oral at bedtime  citalopram 20 milliGRAM(s) Oral daily  clopidogrel Tablet 75 milliGRAM(s) Oral daily  dextrose 5%. 1000 milliLiter(s) (50 mL/Hr) IV Continuous <Continuous>  dextrose 50% Injectable 12.5 Gram(s) IV Push once  dextrose 50% Injectable 25 Gram(s) IV Push once  dextrose 50% Injectable 25 Gram(s) IV Push once  enoxaparin Injectable 40 milliGRAM(s) SubCutaneous daily  influenza   Vaccine 0.5 milliLiter(s) IntraMuscular once  insulin lispro (HumaLOG) corrective regimen sliding scale   SubCutaneous three times a day before meals  insulin lispro (HumaLOG) corrective regimen sliding scale   SubCutaneous at bedtime  levothyroxine 75 MICROGram(s) Oral daily  metoprolol tartrate 25 milliGRAM(s) Oral two times a day  pantoprazole    Tablet 40 milliGRAM(s) Oral before breakfast  ranolazine 500 milliGRAM(s) Oral two times a day  sodium chloride 0.9%. 1000 milliLiter(s) (60 mL/Hr) IV Continuous <Continuous>    MEDICATIONS  (PRN):  acetaminophen   Tablet .. 650 milliGRAM(s) Oral every 6 hours PRN Temp greater or equal to 38C (100.4F), Mild Pain (1 - 3), Moderate Pain (4 - 6)  aluminum hydroxide/magnesium hydroxide/simethicone Suspension 30 milliLiter(s) Oral every 6 hours PRN Dyspepsia  dextrose 40% Gel 15 Gram(s) Oral once PRN Blood Glucose LESS THAN 70 milliGRAM(s)/deciliter  glucagon  Injectable 1 milliGRAM(s) IntraMuscular once PRN Glucose LESS THAN 70 milligrams/deciliter    Vital Signs Last 24 Hrs  T(C): 36.6 (12 Sep 2019 06:11), Max: 36.6 (12 Sep 2019 06:11)  T(F): 97.8 (12 Sep 2019 06:11), Max: 97.8 (12 Sep 2019 06:11)  HR: 67 (12 Sep 2019 06:11) (66 - 77)  BP: 138/68 (12 Sep 2019 06:11) (138/68 - 155/77)  BP(mean): --  RR: 18 (12 Sep 2019 06:11) (16 - 18)  SpO2: 98% (12 Sep 2019 06:11) (98% - 99%)    I&O's Summary        Physical Exam:   GENERAL: NAD, well-groomed, well-developed  HEENT: LEW/   Atraumatic, Normocephalic  ENMT: No tonsillar erythema, exudates, or enlargement; Moist mucous membranes, Good dentition, No lesions  NECK: Supple, No JVD, Normal thyroid  CHEST/LUNG: Clear to auscultaion,  CVS: Regular rate and rhythm; No murmurs, rubs, or gallops  GI: : Soft, Nontender, Nondistended; Bowel sounds present  NERVOUS SYSTEM:  Alert & Oriented X3  EXTREMITIES:  2+ Peripheral Pulses, No clubbing, cyanosis, or edema  LYMPH: No lymphadenopathy noted  SKIN: No rashes or lesions  ENDOCRINOLOGY: No Thyromegaly  PSYCH: depressed affect    Labs:  28  CARDIAC MARKERS ( 10 Sep 2019 13:00 )  x     / x     / 99 u/L / 5.79 ng/mL / x                                12.5   10.71 )-----------( 280      ( 12 Sep 2019 06:45 )             40.2                         12.1   11.86 )-----------( 277      ( 11 Sep 2019 14:39 )             38.6                         11.5   12.81 )-----------( 278      ( 11 Sep 2019 06:18 )             36.8                         12.1   13.59 )-----------( 291      ( 10 Sep 2019 05:05 )             39.7                         11.7   10.83 )-----------( 258      ( 09 Sep 2019 06:04 )             37.7     09-12    124<L>  |  88<L>  |  10  ----------------------------<  88  4.2   |  23  |  0.66  09-11    122<L>  |  87<L>  |  12  ----------------------------<  108<H>  4.1   |  25  |  0.69  09-11    119<LL>  |  83<L>  |  14  ----------------------------<  91  4.6   |  22  |  0.69  09-10    124<L>  |  86<L>  |  12  ----------------------------<  87  4.9   |  25  |  0.76  09-09    125<L>  |  86<L>  |  13  ----------------------------<  131<H>  3.9   |  26  |  0.82  09-08    127<L>  |  87<L>  |  14  ----------------------------<  118<H>  4.3   |  28  |  1.09    Ca    8.2<L>      12 Sep 2019 06:30  Ca    8.2<L>      11 Sep 2019 14:39  Ca    8.3<L>      11 Sep 2019 06:18  Phos  3.5     09-11  Mg     2.2     09-11    TPro  6.8  /  Alb  4.0  /  TBili  0.6  /  DBili  x   /  AST  27  /  ALT  35<H>  /  AlkPhos  85  09-12  TPro  7.0  /  Alb  4.1  /  TBili  0.5  /  DBili  x   /  AST  34<H>  /  ALT  42<H>  /  AlkPhos  85  09-11    CAPILLARY BLOOD GLUCOSE      POCT Blood Glucose.: 92 mg/dL (12 Sep 2019 07:54)  POCT Blood Glucose.: 100 mg/dL (11 Sep 2019 20:57)  POCT Blood Glucose.: 119 mg/dL (11 Sep 2019 16:59)      LIVER FUNCTIONS - ( 12 Sep 2019 06:30 )  Alb: 4.0 g/dL / Pro: 6.8 g/dL / ALK PHOS: 85 u/L / ALT: 35 u/L / AST: 27 u/L / GGT: x               < from: CT Angio Chest w/ IV Cont (09.08.19 @ 10:10) >  contrast into the hepatic veins may represent right heart failure. The   left ventricle is dilated. No pericardial effusion.     No aortic aneurysm or dissection.    LUNGS, AIRWAYS, PLEURA: Patent central airways.     Bandlike scarring within the bilateral apices are unchanged. No pulmonary   edema. Stable 7 mm nodule anterior left upper lobe (series 2 image 43).     Cluster of 5 mm and smaller nodules within the right lower lobe likely   representing bronchiolitis or impacted distal airways. Mucoid impaction   and subsegmental atelectasis within the lingula.     No pleural effusions or pneumothorax.    MEDIASTINUM: No mass or lymphadenopathy.    UPPER ABDOMEN: Cholecystectomy.    BONES AND SOFT TISSUES: No aggressive osseous lesion.    LOWER NECK: Within normal limits.      IMPRESSION:     1.  No pulmonary embolus.    2.  Cardiomegaly.      < end of copied text >        RECENT CULTURES:  09-08 @ 10:20 BLOOD VENOUS                  NO ORGANISMS ISOLATED  NO ORGANISMS ISOLATED AT 96 HOURS  09-06 @ 14:58 BLOOD PERIPHERAL   PCR

## 2019-09-12 NOTE — PROGRESS NOTE ADULT - PROBLEM SELECTOR PLAN 4
cont curretn meds  9/9: ? [place] [facility]  9/10: per cards: check tft's  9/11: cont current meds  9/12: cont curretn rx

## 2019-09-12 NOTE — PROGRESS NOTE ADULT - SUBJECTIVE AND OBJECTIVE BOX
'    No pain, no shortness of breath      VITAL:  T(C): , Max: 36.6 (09-12-19 @ 06:11)  T(F): , Max: 97.8 (09-12-19 @ 06:11)  HR: 67 (09-12-19 @ 06:11)  BP: 138/68 (09-12-19 @ 06:11)  BP(mean): --  RR: 18 (09-12-19 @ 06:11)  SpO2: 98% (09-12-19 @ 06:11)      PHYSICAL EXAM:  Constitutional: Frail, thin to cachectic, anxious, confused  HEENT: NCAT, DMM  Neck: Supple, No JVD  Respiratory: CTA-b/l  Cardiovascular: RRR s1s2, no m/r/g  Gastrointestinal: BS+, soft, NT/ND  Extremities: No peripheral edema b/l  Neurological: no focal deficits; strength grossly intact  Back: no CVAT b/l  Skin: No rashes, no nevi      LABS:                        12.5   10.71 )-----------( 280      ( 12 Sep 2019 06:45 )             40.2     Na(124)/K(4.2)/Cl(88)/HCO3(23)/BUN(10)/Cr(0.66)Glu(88)/Ca(8.2)/Mg(--)/PO4(--)    09-12 @ 06:30  Na(122)/K(4.1)/Cl(87)/HCO3(25)/BUN(12)/Cr(0.69)Glu(108)/Ca(8.2)/Mg(--)/PO4(--)    09-11 @ 14:39  Na(119)/K(4.6)/Cl(83)/HCO3(22)/BUN(14)/Cr(0.69)Glu(91)/Ca(8.3)/Mg(2.2)/PO4(3.5)    09-11 @ 06:18  Na(124)/K(4.9)/Cl(86)/HCO3(25)/BUN(12)/Cr(0.76)Glu(87)/Ca(8.8)/Mg(2.2)/PO4(3.7)    09-10 @ 05:05      Sodium, Random Urine: 96 mmol/L (09-11 @ 22:50)  Potassium, Random Urine: 37.0 mmol/L (09-11 @ 22:50)  Chloride, Random Urine: 79 mmol/L (09-11 @ 22:50)  Osmolality, Random Urine: 411 mosmo/kg (09-11 @ 22:50)      IMPRESSION: 84F w/ HTN, CAD, and esophageal tear s/p clipping, 9/6/19 a/w chest pain, c/b hyponatremia    (1)Hyponatremia - largely due to hypovolemia/poor osm intake. Slowly improving with isotonic IVF  (2)pain/nausea/chills, etc - not hyponatremia-associated; she was having these symptoms when her sodium was normal.      RECOMMEND:  (1)NS 60cc/h as ordered  (2)BMP qd            Rolando Quintana MD  [facility]  [phone] '    Complains of weakness/malaise. family at bedside      VITAL:  T(C): , Max: 36.6 (09-12-19 @ 06:11)  T(F): , Max: 97.8 (09-12-19 @ 06:11)  HR: 67 (09-12-19 @ 06:11)  BP: 138/68 (09-12-19 @ 06:11)  BP(mean): --  RR: 18 (09-12-19 @ 06:11)  SpO2: 98% (09-12-19 @ 06:11)      PHYSICAL EXAM:  Constitutional: Frail, thin to cachectic, anxious, confused  HEENT: NCAT, DMM  Neck: Supple, No JVD  Respiratory: CTA-b/l  Cardiovascular: RRR s1s2, no m/r/g  Gastrointestinal: BS+, soft, NT/ND  Extremities: No peripheral edema b/l  Neurological: no focal deficits; strength grossly intact  Back: no CVAT b/l  Skin: No rashes, no nevi      LABS:                        12.5   10.71 )-----------( 280      ( 12 Sep 2019 06:45 )             40.2     Na(124)/K(4.2)/Cl(88)/HCO3(23)/BUN(10)/Cr(0.66)Glu(88)/Ca(8.2)/Mg(--)/PO4(--)    09-12 @ 06:30  Na(122)/K(4.1)/Cl(87)/HCO3(25)/BUN(12)/Cr(0.69)Glu(108)/Ca(8.2)/Mg(--)/PO4(--)    09-11 @ 14:39  Na(119)/K(4.6)/Cl(83)/HCO3(22)/BUN(14)/Cr(0.69)Glu(91)/Ca(8.3)/Mg(2.2)/PO4(3.5)    09-11 @ 06:18  Na(124)/K(4.9)/Cl(86)/HCO3(25)/BUN(12)/Cr(0.76)Glu(87)/Ca(8.8)/Mg(2.2)/PO4(3.7)    09-10 @ 05:05      Sodium, Random Urine: 96 mmol/L (09-11 @ 22:50)  Potassium, Random Urine: 37.0 mmol/L (09-11 @ 22:50)  Chloride, Random Urine: 79 mmol/L (09-11 @ 22:50)  Osmolality, Random Urine: 411 mosmo/kg (09-11 @ 22:50)      IMPRESSION: 84F w/ HTN, CAD, and esophageal tear s/p clipping, 9/6/19 a/w chest pain, c/b hyponatremia    (1)Hyponatremia - largely due to hypovolemia/poor osm intake. Slowly improving with isotonic IVF  (2)pain/nausea/chills, etc - not hyponatremia-associated; she was having these symptoms when her sodium was normal.      RECOMMEND:  (1)NS 60cc/h as ordered  (2)BMP qd            Rolando Quintana MD  [facility]  [phone]

## 2019-09-13 ENCOUNTER — TRANSCRIPTION ENCOUNTER (OUTPATIENT)
Age: 84
End: 2019-09-13

## 2019-09-13 VITALS
RESPIRATION RATE: 18 BRPM | HEART RATE: 70 BPM | TEMPERATURE: 99 F | DIASTOLIC BLOOD PRESSURE: 78 MMHG | SYSTOLIC BLOOD PRESSURE: 148 MMHG | OXYGEN SATURATION: 97 %

## 2019-09-13 LAB
ANION GAP SERPL CALC-SCNC: 10 MMO/L — SIGNIFICANT CHANGE UP (ref 7–14)
BACTERIA BLD CULT: SIGNIFICANT CHANGE UP
BUN SERPL-MCNC: 7 MG/DL — SIGNIFICANT CHANGE UP (ref 7–23)
CALCIUM SERPL-MCNC: 7.8 MG/DL — LOW (ref 8.4–10.5)
CHLORIDE SERPL-SCNC: 93 MMOL/L — LOW (ref 98–107)
CO2 SERPL-SCNC: 26 MMOL/L — SIGNIFICANT CHANGE UP (ref 22–31)
CREAT SERPL-MCNC: 0.6 MG/DL — SIGNIFICANT CHANGE UP (ref 0.5–1.3)
GLUCOSE BLDC GLUCOMTR-MCNC: 189 MG/DL — HIGH (ref 70–99)
GLUCOSE BLDC GLUCOMTR-MCNC: 94 MG/DL — SIGNIFICANT CHANGE UP (ref 70–99)
GLUCOSE SERPL-MCNC: 167 MG/DL — HIGH (ref 70–99)
HCT VFR BLD CALC: 33.6 % — LOW (ref 34.5–45)
HGB BLD-MCNC: 10.6 G/DL — LOW (ref 11.5–15.5)
MAGNESIUM SERPL-MCNC: 1.9 MG/DL — SIGNIFICANT CHANGE UP (ref 1.6–2.6)
MCHC RBC-ENTMCNC: 23.8 PG — LOW (ref 27–34)
MCHC RBC-ENTMCNC: 31.5 % — LOW (ref 32–36)
MCV RBC AUTO: 75.5 FL — LOW (ref 80–100)
NRBC # FLD: 0 K/UL — SIGNIFICANT CHANGE UP (ref 0–0)
PHOSPHATE SERPL-MCNC: 2.5 MG/DL — SIGNIFICANT CHANGE UP (ref 2.5–4.5)
PLATELET # BLD AUTO: 253 K/UL — SIGNIFICANT CHANGE UP (ref 150–400)
PMV BLD: 10.1 FL — SIGNIFICANT CHANGE UP (ref 7–13)
POTASSIUM SERPL-MCNC: 3.7 MMOL/L — SIGNIFICANT CHANGE UP (ref 3.5–5.3)
POTASSIUM SERPL-SCNC: 3.7 MMOL/L — SIGNIFICANT CHANGE UP (ref 3.5–5.3)
RBC # BLD: 4.45 M/UL — SIGNIFICANT CHANGE UP (ref 3.8–5.2)
RBC # FLD: 17.5 % — HIGH (ref 10.3–14.5)
SODIUM SERPL-SCNC: 129 MMOL/L — LOW (ref 135–145)
VIT B12 SERPL-MCNC: 930 PG/ML — HIGH (ref 200–900)
WBC # BLD: 10.54 K/UL — HIGH (ref 3.8–10.5)
WBC # FLD AUTO: 10.54 K/UL — HIGH (ref 3.8–10.5)

## 2019-09-13 RX ORDER — SODIUM CHLORIDE 9 MG/ML
1 INJECTION INTRAMUSCULAR; INTRAVENOUS; SUBCUTANEOUS
Qty: 60 | Refills: 0
Start: 2019-09-13 | End: 2019-10-12

## 2019-09-13 RX ORDER — AMLODIPINE BESYLATE 2.5 MG/1
1 TABLET ORAL
Qty: 30 | Refills: 0
Start: 2019-09-13 | End: 2019-10-12

## 2019-09-13 RX ORDER — CLOPIDOGREL BISULFATE 75 MG/1
1 TABLET, FILM COATED ORAL
Qty: 30 | Refills: 0
Start: 2019-09-13 | End: 2019-10-12

## 2019-09-13 RX ORDER — CITALOPRAM 10 MG/1
1 TABLET, FILM COATED ORAL
Qty: 0 | Refills: 0 | DISCHARGE

## 2019-09-13 RX ORDER — SODIUM CHLORIDE 9 MG/ML
1 INJECTION INTRAMUSCULAR; INTRAVENOUS; SUBCUTANEOUS
Refills: 0 | Status: DISCONTINUED | OUTPATIENT
Start: 2019-09-13 | End: 2019-09-13

## 2019-09-13 RX ORDER — TICAGRELOR 90 MG/1
1 TABLET ORAL
Qty: 0 | Refills: 0 | DISCHARGE

## 2019-09-13 RX ORDER — FUROSEMIDE 40 MG
1 TABLET ORAL
Qty: 0 | Refills: 0 | DISCHARGE

## 2019-09-13 RX ORDER — ASPIRIN/CALCIUM CARB/MAGNESIUM 324 MG
1 TABLET ORAL
Qty: 0 | Refills: 0 | DISCHARGE

## 2019-09-13 RX ORDER — RANOLAZINE 500 MG/1
1 TABLET, FILM COATED, EXTENDED RELEASE ORAL
Qty: 0 | Refills: 0 | DISCHARGE

## 2019-09-13 RX ADMIN — Medication 25 MILLIGRAM(S): at 06:05

## 2019-09-13 RX ADMIN — CLOPIDOGREL BISULFATE 75 MILLIGRAM(S): 75 TABLET, FILM COATED ORAL at 12:11

## 2019-09-13 RX ADMIN — PANTOPRAZOLE SODIUM 40 MILLIGRAM(S): 20 TABLET, DELAYED RELEASE ORAL at 06:05

## 2019-09-13 RX ADMIN — ENOXAPARIN SODIUM 40 MILLIGRAM(S): 100 INJECTION SUBCUTANEOUS at 17:07

## 2019-09-13 RX ADMIN — Medication 25 MILLIGRAM(S): at 17:07

## 2019-09-13 RX ADMIN — SODIUM CHLORIDE 1 GRAM(S): 9 INJECTION INTRAMUSCULAR; INTRAVENOUS; SUBCUTANEOUS at 17:07

## 2019-09-13 RX ADMIN — Medication 75 MICROGRAM(S): at 06:05

## 2019-09-13 RX ADMIN — AMLODIPINE BESYLATE 2.5 MILLIGRAM(S): 2.5 TABLET ORAL at 06:05

## 2019-09-13 RX ADMIN — Medication 1: at 12:11

## 2019-09-13 NOTE — PROGRESS NOTE ADULT - ATTENDING COMMENTS
breathing wise she has been doing well: no pneumonia: cty chest clear with no PE: no antibiotics:
breathing wise she has been doing well: no pneumonia: cty chest clear with no PE: no antibiotics:  9/10: breathign wise she is OK
breathing wise she has been doing well: no pneumonia: cty chest clear with no PE: no antibiotics:  9/10: breathign wise she is OK  9/11: hyponatremic: nephrology to see
breathing wise she has been doing well: no pneumonia: cty chest clear with no PE: no antibiotics:  9/10: breathign wise she is OK  9/11: hyponatremic: nephrology to see  9/12: Seesm to eb doing ok : no SOB : but feeling very weak:
breathing wise she has been doing well: no pneumonia: cty chest clear with no PE: no antibiotics:  9/10: breathign wise she is OK  9/11: hyponatremic: nephrology to see  9/12: Seesm to eb doing ok : no SOB : but feeling very weak:  9/13: Pt is doing ok : but still is feeling weak: no SOB : no c hest pain

## 2019-09-13 NOTE — DISCHARGE NOTE PROVIDER - NSDCCPCAREPLAN_GEN_ALL_CORE_FT
PRINCIPAL DISCHARGE DIAGNOSIS  Diagnosis: Pneumonia  Assessment and Plan of Treatment: Completed antibiotics on 9/9      SECONDARY DISCHARGE DIAGNOSES  Diagnosis: Hyponatremia  Assessment and Plan of Treatment: Continue to take sodium chloride tab and make a follow up appt with Dr Ziegler in 1 week to repeat Sodium level    Diagnosis: Atypical chest pain  Assessment and Plan of Treatment: - cardio Dr Dotson: chest pain due to PNA. Recent NST 2 weeks ago negative for ischemia. no further cardiac testing done   - 9/8 CT angio chest: No Pulmonary Embollism    Diagnosis: DM (diabetes mellitus)  Assessment and Plan of Treatment: Hg A1c 6.6 %    Diagnosis: CAD (coronary artery disease)  Assessment and Plan of Treatment: Hx of s/p PCI, HTN, HLD   Medications adjusted: stop Ranexa --no angina  -Continue clopidogrel 75mg daily (NO Brilinta)  - Continue metoprolol 25mg 2x day.    Diagnosis: Hypothyroidism  Assessment and Plan of Treatment: - c/w Synthroid. TSH 1.7    Diagnosis: HLD (hyperlipidemia)  Assessment and Plan of Treatment: atorvastatin 20mg at bed time    Diagnosis: Abdominal pain  Assessment and Plan of Treatment: resoved       Diagnosis: Pneumonia  Assessment and Plan of Treatment:

## 2019-09-13 NOTE — PROGRESS NOTE ADULT - PROBLEM SELECTOR PLAN 1
per PMD  9/10: Ganesh dced: check TFT  9/11: doing ok  no SOB  9/12: seems to be doing ok breathing wise:  9/13: doing reasonable: no SOB : no wheezing: still I s feelign weak

## 2019-09-13 NOTE — PROGRESS NOTE ADULT - NSHPATTENDINGPLANDISCUSS_GEN_ALL_CORE
pt, son, PA, and Medical Attending
Pt son and daughter
Pt, son, and Medical Attending and PA
NP
NP: simón
NP: simón
NP
pt
pt

## 2019-09-13 NOTE — PROGRESS NOTE ADULT - PROBLEM SELECTOR PLAN 3
no pneumonia or PE : has pulm nodule which has been stable: > reason for fever: has contaminated positive blood clutres: pts family wants IDc onsult  9/9: seesm to be doing ok : abiotics have been dced: no SOB : no cough  9/10: breathing wise OK: off antibtiocs!  9/11: reslved  9/12: resolved: last ct with no infiltrates  9/13: resolved: no PE

## 2019-09-13 NOTE — PROGRESS NOTE ADULT - PROBLEM SELECTOR PROBLEM 6
DM (diabetes mellitus)
HLD (hyperlipidemia)

## 2019-09-13 NOTE — PROGRESS NOTE ADULT - PROBLEM/PLAN-5
DISPLAY PLAN FREE TEXT
05-Feb-2019 09:35

## 2019-09-13 NOTE — DISCHARGE NOTE PROVIDER - HOSPITAL COURSE
83 y/o Sam speaking female with a PMHx of CAD S/P stent placement, VF S/P life vest, HTN, HLD, pre-DM, GERD, and hypothyroidism p/w pleuritic right sided chest pain in the setting of R LL Pneumonia 9/6 CT A/P: RLL PNA-ABx  completed on 9/9. BCx -- NTD        Chest pain- Likely pleurisy in the setting of underlying pneumonia    - CE x2: Trop 42-->40    - 9/6 CXR: Clear lungs.    - 9/6 US abdomen: s/p mikey. Otherwise unremarkable    - cardio Dr Dotson: chest pain due to PNA. Recent NST 2 weeks ago negative for ischemia. no further cardiac testing done     - 9/8 CT angio chest: No PE        CAD s/p PCI, HTN, HLD     -stop Ranexa -no angina    -started clopidogrel 75mg daily (off Brilinta)    - metoprolol 25mg BID, atorvastatin 20mg qhs        DM- hgba1c 6.6    - FS AC        Hypothyroidism - c/w Synthroid    - TSH 1.7        GERD    - Protonix 40mg        Hyponatremia- NS@60cc/hr, lift na restriction, DC Celexa    9/13 started on NA tabs pt to repeat labs in 1 week w/ dr Ziegler

## 2019-09-13 NOTE — PROGRESS NOTE ADULT - REASON FOR ADMISSION
Chest pain

## 2019-09-13 NOTE — PROGRESS NOTE ADULT - PROBLEM SELECTOR PLAN 2
Her generalized symptoms could be related to hyponatremia: need to restrict feree water: nephrology to see  /12: still pretty hyponatremic : on IV fluids:  9/13: await na today

## 2019-09-13 NOTE — DISCHARGE NOTE NURSING/CASE MANAGEMENT/SOCIAL WORK - PATIENT PORTAL LINK FT
You can access the FollowMyHealth Patient Portal offered by [facility] by registering at the following website: [email or web address]. By joining [facility]’s FollowMyHealth portal, you will also be able to view your health information using other applications (apps) compatible with our system.

## 2019-09-13 NOTE — PROGRESS NOTE ADULT - PROBLEM SELECTOR PLAN 4
cont curretn meds  9/9: ? [place] burn[facility]illinzaki  9/10: per cards: check tft's  9/11: cont current meds  9/12: cont curretn rx  9/13: started plavix:

## 2019-09-13 NOTE — PROGRESS NOTE ADULT - SUBJECTIVE AND OBJECTIVE BOX
Patient is a 84y old  Female who presents with a chief complaint of Chest pain (12 Sep 2019 18:40)      Any change in ROS: pt is doing ok : no SOB : but still i s feeling weak     MEDICATIONS  (STANDING):  amLODIPine   Tablet 2.5 milliGRAM(s) Oral daily  atorvastatin 20 milliGRAM(s) Oral at bedtime  clopidogrel Tablet 75 milliGRAM(s) Oral daily  dextrose 5%. 1000 milliLiter(s) (50 mL/Hr) IV Continuous <Continuous>  dextrose 50% Injectable 12.5 Gram(s) IV Push once  dextrose 50% Injectable 25 Gram(s) IV Push once  dextrose 50% Injectable 25 Gram(s) IV Push once  enoxaparin Injectable 40 milliGRAM(s) SubCutaneous daily  influenza   Vaccine 0.5 milliLiter(s) IntraMuscular once  insulin lispro (HumaLOG) corrective regimen sliding scale   SubCutaneous three times a day before meals  insulin lispro (HumaLOG) corrective regimen sliding scale   SubCutaneous at bedtime  levothyroxine 75 MICROGram(s) Oral daily  metoprolol tartrate 25 milliGRAM(s) Oral two times a day  pantoprazole    Tablet 40 milliGRAM(s) Oral before breakfast  sodium chloride 0.9%. 1000 milliLiter(s) (60 mL/Hr) IV Continuous <Continuous>    MEDICATIONS  (PRN):  acetaminophen   Tablet .. 650 milliGRAM(s) Oral every 6 hours PRN Temp greater or equal to 38C (100.4F), Mild Pain (1 - 3), Moderate Pain (4 - 6)  aluminum hydroxide/magnesium hydroxide/simethicone Suspension 30 milliLiter(s) Oral every 6 hours PRN Dyspepsia  dextrose 40% Gel 15 Gram(s) Oral once PRN Blood Glucose LESS THAN 70 milliGRAM(s)/deciliter  glucagon  Injectable 1 milliGRAM(s) IntraMuscular once PRN Glucose LESS THAN 70 milligrams/deciliter    Vital Signs Last 24 Hrs  T(C): 36.6 (13 Sep 2019 06:01), Max: 36.8 (12 Sep 2019 13:31)  T(F): 97.9 (13 Sep 2019 06:01), Max: 98.3 (12 Sep 2019 13:31)  HR: 71 (13 Sep 2019 06:01) (67 - 71)  BP: 151/66 (13 Sep 2019 06:01) (129/62 - 151/66)  BP(mean): --  RR: 18 (13 Sep 2019 06:01) (18 - 19)  SpO2: 97% (13 Sep 2019 06:01) (97% - 98%)    I&O's Summary        Physical Exam:   GENERAL: NAD, well-groomed, well-developed  HEENT: LEW/   Atraumatic, Normocephalic  ENMT: No tonsillar erythema, exudates, or enlargement; Moist mucous membranes, Good dentition, No lesions  NECK: Supple, No JVD, Normal thyroid  CHEST/LUNG: Clear to auscultaion  CVS: Regular rate and rhythm; No murmurs, rubs, or gallops  GI: : Soft, Nontender, Nondistended; Bowel sounds present  NERVOUS SYSTEM:  Alert & Oriented X3  EXTREMITIES:  2+ Peripheral Pulses, No clubbing, cyanosis, or edema  LYMPH: No lymphadenopathy noted  SKIN: No rashes or lesions  ENDOCRINOLOGY: No Thyromegaly  PSYCH: Appropriate    Labs:                              10.6   10.54 )-----------( 253      ( 13 Sep 2019 09:41 )             33.6                         12.5   10.71 )-----------( 280      ( 12 Sep 2019 06:45 )             40.2                         12.1   11.86 )-----------( 277      ( 11 Sep 2019 14:39 )             38.6                         11.5   12.81 )-----------( 278      ( 11 Sep 2019 06:18 )             36.8                         12.1   13.59 )-----------( 291      ( 10 Sep 2019 05:05 )             39.7     09-12    124<L>  |  88<L>  |  10  ----------------------------<  88  4.2   |  23  |  0.66  09-11    122<L>  |  87<L>  |  12  ----------------------------<  108<H>  4.1   |  25  |  0.69  09-11    119<LL>  |  83<L>  |  14  ----------------------------<  91  4.6   |  22  |  0.69  09-10    124<L>  |  86<L>  |  12  ----------------------------<  87  4.9   |  25  |  0.76    Ca    8.2<L>      12 Sep 2019 06:30  Ca    8.2<L>      11 Sep 2019 14:39    TPro  6.8  /  Alb  4.0  /  TBili  0.6  /  DBili  x   /  AST  27  /  ALT  35<H>  /  AlkPhos  85    TPro  7.0  /  Alb  4.1  /  TBili  0.5  /  DBili  x   /  AST  34<H>  /  ALT  42<H>  /  AlkPhos  85      CAPILLARY BLOOD GLUCOSE      POCT Blood Glucose.: 94 mg/dL (13 Sep 2019 07:42)  POCT Blood Glucose.: 156 mg/dL (12 Sep 2019 22:15)  POCT Blood Glucose.: 164 mg/dL (12 Sep 2019 16:49)  POCT Blood Glucose.: 93 mg/dL (12 Sep 2019 11:47)      LIVER FUNCTIONS - ( 12 Sep 2019 06:30 )  Alb: 4.0 g/dL / Pro: 6.8 g/dL / ALK PHOS: 85 u/L / ALT: 35 u/L / AST: 27 u/L / GGT: x             Urinalysis Basic - ( 12 Sep 2019 19:00 )    Color: LIGHT YELLOW / Appearance: CLEAR / S.009 / pH: 6.0  Gluc: NEGATIVE / Ketone: NEGATIVE  / Bili: NEGATIVE / Urobili: NORMAL   Blood: NEGATIVE / Protein: NEGATIVE / Nitrite: NEGATIVE   Leuk Esterase: NEGATIVE / RBC: x / WBC x   Sq Epi: x / Non Sq Epi: x / Bacteria: x            RECENT CULTURES:   @ 10:20 BLOOD VENOUS         < from: CT Angio Chest w/ IV Cont (19 @ 10:10) >    FINDINGS:     PULMONARY VESSELS: No pulmonary embolus. Main pulmonary artery normal in   diameter.     HEART AND VASCULATURE: The right atrium is mildly dilated. Reflux of the   contrast into the hepatic veins may represent right heart failure. The   left ventricle is dilated. No pericardial effusion.     No aortic aneurysm or dissection.    LUNGS, AIRWAYS, PLEURA: Patent central airways.     Bandlike scarring within the bilateral apices are unchanged. No pulmonary   edema. Stable 7 mm nodule anterior left upper lobe (series 2 image 43).     Cluster of 5 mm and smaller nodules within the right lower lobe likely   representing bronchiolitis or impacted distal airways. Mucoid impaction   and subsegmental atelectasis within the lingula.     No pleural effusions or pneumothorax.    MEDIASTINUM: No mass or lymphadenopathy.    UPPER ABDOMEN: Cholecystectomy.    BONES AND SOFT TISSUES: No aggressive osseous lesion.    LOWER NECK: Within normal limits.      IMPRESSION:     1.  No pulmonary embolus.    2.  Cardiomegaly.      < end of copied text >           NO ORGANISMS ISOLATED   @ 14:58 BLOOD PERIPHERAL   PCR      ***** CRITICAL RESULT *****  PERSON CALLED / READ-BACK:DESIREE ARNETT/MALINDA/Y  DATE / TIME CALLED: 19 1025  CALLED BY: SERJIO COCHRAN  GPCCL^Gram Pos Cocci In Clusters  AFTER: 16 HOURS INCUBATION  BOTTLE: AEROBIC BOTTLE  BLOOD CULTURE PCR  Staphylococcus sp.,coag neg  BLOOD CULTURE PCR  ***Blood Panel PCR results on this specimen are available  approximately 3 hours after the Gram stain result***  Gram stain, PCR, and/or culture results may not always  correspond due to difference in methodologies  ------------------------------------------------------------  This PCR assay was performed using [employer].  The  following targets are tested for:  Enterococcus, vancomycin  resistant enterococci, Listeria monocytogenes,  coagulase  negative staphylococci, S. aureus, methicillin resistant S.  aureus, Streptococcus agalactiae (Group B), S. pneumoniae,  S. pyogenes (Group A), Acinetobacter baumannii, Enterobacter  cloacae, E. coli, Klebsiella oxytoca, K. pneumoniae, Proteus  sp., Serratia marcescens, Haemophilus influenzae, Neisseria  meningitidis, Pseudomonas aeruginosa, Candida albicans, C.  glabrata, C. krusei, C. parapsilosis, C. tropicalis and the  KPC resistance gene.  **NOTE: Due to technical problems, Proteus sp. will NOT be  reported as part of the BCID panel until further notice.       ***Blood Panel PCR results on this specimen are available  approximately 3 hours after the Gram stain result***  Gram stain, PCR, and/or culture results may not always  correspond due to difference in methodologies  ------------------------------------------------------------  This PCR assay was performed using [employer].  The  following targets are tested for:  Enterococcus, vancomycin  resistant enterococci, Listeria monocytogenes,  coagulase  negative staphylococci, S. aureus, methicillin resistant S.  aureus, Streptococcus agalactiae (Group B), S. pneumoniae,  S. pyogenes (Group A), Acinetobacter baumannii, Enterobacter  cloacae, E. coli, Klebsiella oxytoca, K. pneumoniae, Proteus  sp., Serratia marcescens, Haemophilus influenzae, Neisseria  meningitidis, Pseudomonas aeruginosa, Candida albicans, C.  glabrata, C. krusei, C. parapsilosis, C. tropicalis and the  KPC resistance gene.  **NOTE: Due to technical problems, Proteus sp. will NOT be  reported as part of the BCID paneluntil further notice.        RESPIRATORY CULTURES:          Studies  Chest X-RAY  CT SCAN Chest   Venous Dopplers: LE:   CT Abdomen  Others

## 2019-09-13 NOTE — PROGRESS NOTE ADULT - SUBJECTIVE AND OBJECTIVE BOX
No pain, no shortness of breath      VITAL:  T(C): , Max: 36.8 (19 @ 22:15)  T(F): , Max: 98.3 (19 @ 22:15)  HR: 71 (19 @ 06:01)  BP: 151/66 (19 @ 06:01)  RR: 18 (19 @ 06:01)  SpO2: 97% (19 @ 06:01)      PHYSICAL EXAM:    Constitutional: NAD; Alert  HEENT:  NCAT; DMM  Neck: No JVD; supple  Respiratory: CTA-b/l  Cardiac: RRR s1s2  Gastrointestinal: BS+, soft, NT/ND  Urologic: No hernández  Extremities: No peripheral edema  Back: No CVAT b/l    LABS:                        10.6   10.54 )-----------( 253      ( 13 Sep 2019 09:41 )             33.6     Na(129)/K(3.7)/Cl(93)/HCO3(26)/BUN(7)/Cr(0.60)Glu(167)/Ca(7.8)/Mg(1.9)/PO4(2.5)     @ 09:41  Na(124)/K(4.2)/Cl(88)/HCO3(23)/BUN(10)/Cr(0.66)Glu(88)/Ca(8.2)/Mg(--)/PO4(--)     @ 06:30  Na(122)/K(4.1)/Cl(87)/HCO3(25)/BUN(12)/Cr(0.69)Glu(108)/Ca(8.2)/Mg(--)/PO4(--)     @ 14:39  Na(119)/K(4.6)/Cl(83)/HCO3(22)/BUN(14)/Cr(0.69)Glu(91)/Ca(8.3)/Mg(2.2)/PO4(3.5)     @ 06:18    Urinalysis Basic - ( 12 Sep 2019 19:00 )  Color: LIGHT YELLOW / Appearance: CLEAR / S.009 / pH: 6.0  Gluc: NEGATIVE / Ketone: NEGATIVE  / Bili: NEGATIVE / Urobili: NORMAL   Blood: NEGATIVE / Protein: NEGATIVE / Nitrite: NEGATIVE   Leuk Esterase: NEGATIVE / RBC: x / WBC x   Sq Epi: x / Non Sq Epi: x / Bacteria: x  Sodium, Random Urine: 96 mmol/L ( @ 22:50)  Potassium, Random Urine: 37.0 mmol/L ( @ 22:50)  Chloride, Random Urine: 79 mmol/L ( @ 22:50)  Osmolality, Random Urine: 411 mosmo/kg ( @ 22:50)      IMPRESSION: 84F w/ HTN, CAD, and esophageal tear s/p clipping, 19 a/w chest pain, c/b hyponatremia    (1)Hyponatremia - largely due to hypovolemia/poor osm intake. Notably improved, with gentle isotonic IVF  (2)pain/nausea/chills, etc - not hyponatremia-associated; she was having these symptoms when her sodium was normal.      RECOMMEND:  (1)D/C NS at this point  (2)Encourage Ensure/increased osmotic intake  (3)Add NaCl 1gm po bid  (4)BMP daily while admitted  (5)Reinitiate NS@60cc/h if sodium drops to 125 or lower        Rolando Quintana MD  [facility]  [phone] No complaints at present. Family at bedside and attest to poor osmotic intake on the patient's behalf      VITAL:  T(C): , Max: 36.8 (19 @ 22:15)  T(F): , Max: 98.3 (19 @ 22:15)  HR: 71 (19 @ 06:01)  BP: 151/66 (19 @ 06:01)  RR: 18 (19 @ 06:01)  SpO2: 97% (19 @ 06:01)      PHYSICAL EXAM:    Constitutional: lethargic, cachectic, NAD  HEENT:  NCAT; DMM  Neck: No JVD; supple  Respiratory: CTA-b/l  Cardiac: RRR s1s2  Gastrointestinal: BS+, soft, NT/ND  Urologic: No hernández  Extremities: No peripheral edema  Back: No CVAT b/l    LABS:                        10.6   10.54 )-----------( 253      ( 13 Sep 2019 09:41 )             33.6     Na(129)/K(3.7)/Cl(93)/HCO3(26)/BUN(7)/Cr(0.60)Glu(167)/Ca(7.8)/Mg(1.9)/PO4(2.5)     @ 09:41  Na(124)/K(4.2)/Cl(88)/HCO3(23)/BUN(10)/Cr(0.66)Glu(88)/Ca(8.2)/Mg(--)/PO4(--)     @ 06:30  Na(122)/K(4.1)/Cl(87)/HCO3(25)/BUN(12)/Cr(0.69)Glu(108)/Ca(8.2)/Mg(--)/PO4(--)     @ 14:39  Na(119)/K(4.6)/Cl(83)/HCO3(22)/BUN(14)/Cr(0.69)Glu(91)/Ca(8.3)/Mg(2.2)/PO4(3.5)     @ 06:18    Urinalysis Basic - ( 12 Sep 2019 19:00 )  Color: LIGHT YELLOW / Appearance: CLEAR / S.009 / pH: 6.0  Gluc: NEGATIVE / Ketone: NEGATIVE  / Bili: NEGATIVE / Urobili: NORMAL   Blood: NEGATIVE / Protein: NEGATIVE / Nitrite: NEGATIVE   Leuk Esterase: NEGATIVE / RBC: x / WBC x   Sq Epi: x / Non Sq Epi: x / Bacteria: x  Sodium, Random Urine: 96 mmol/L ( @ 22:50)  Potassium, Random Urine: 37.0 mmol/L ( @ 22:50)  Chloride, Random Urine: 79 mmol/L ( @ 22:50)  Osmolality, Random Urine: 411 mosmo/kg ( @ 22:50)      IMPRESSION: 84F w/ HTN, CAD, and esophageal tear s/p clipping, 19 a/w chest pain, c/b hyponatremia    (1)Hyponatremia - largely due to hypovolemia/poor osm intake. Notably improved, with gentle isotonic IVF  (2)pain/nausea/chills, etc - not hyponatremia-associated; she was having these symptoms when her sodium was normal.      RECOMMEND:  (1)D/C NS at this point  (2)Encourage Ensure/increased osmotic intake  (3)Add NaCl 1gm po bid  (4)No objection to discharge today with BMP next week      Rolando Quintana MD  [facility]  [phone]

## 2019-09-13 NOTE — DISCHARGE NOTE PROVIDER - CARE PROVIDER_API CALL
Teresa Ziegler)  Internal Medicine  [address] [place] [address]  Phone: [phone]  Fax: [phone]  Follow Up Time:

## 2019-09-17 RX ORDER — NORMAL SALT TABLETS 1 G/G
1 TABLET ORAL TWICE DAILY
Refills: 0 | Status: ACTIVE | COMMUNITY
Start: 2019-09-17

## 2019-09-17 RX ORDER — ATORVASTATIN CALCIUM 20 MG/1
20 TABLET, FILM COATED ORAL
Refills: 0 | Status: ACTIVE | COMMUNITY
Start: 2019-09-17

## 2019-09-17 RX ORDER — AMLODIPINE BESYLATE 2.5 MG/1
2.5 TABLET ORAL DAILY
Refills: 0 | Status: ACTIVE | COMMUNITY
Start: 2019-09-17

## 2019-10-24 NOTE — PROGRESS NOTE ADULT - PROBLEM SELECTOR PROBLEM 2
COPD is unchanged.  Continue current medications.  COPD well controlled on advair.  Refill rescue inhaler.    
CAP (community acquired pneumonia)
Hyponatremia

## 2021-05-28 NOTE — ED PROVIDER NOTE - CONSTITUTIONAL NEGATIVE STATEMENT, MLM
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
## 2022-11-11 ENCOUNTER — INPATIENT (INPATIENT)
Facility: HOSPITAL | Age: 87
LOS: 7 days | Discharge: HOME | End: 2022-11-19
Attending: INTERNAL MEDICINE | Admitting: INTERNAL MEDICINE

## 2022-11-11 VITALS
TEMPERATURE: 100 F | RESPIRATION RATE: 18 BRPM | SYSTOLIC BLOOD PRESSURE: 143 MMHG | OXYGEN SATURATION: 99 % | DIASTOLIC BLOOD PRESSURE: 81 MMHG | HEART RATE: 96 BPM

## 2022-11-11 DIAGNOSIS — Z95.5 PRESENCE OF CORONARY ANGIOPLASTY IMPLANT AND GRAFT: Chronic | ICD-10-CM

## 2022-11-11 DIAGNOSIS — Z90.49 ACQUIRED ABSENCE OF OTHER SPECIFIED PARTS OF DIGESTIVE TRACT: Chronic | ICD-10-CM

## 2022-11-11 LAB
ALBUMIN SERPL ELPH-MCNC: 3.4 G/DL — SIGNIFICANT CHANGE UP (ref 3.3–5)
ALP SERPL-CCNC: 81 U/L — SIGNIFICANT CHANGE UP (ref 40–120)
ALT FLD-CCNC: 26 U/L — SIGNIFICANT CHANGE UP (ref 4–33)
ANION GAP SERPL CALC-SCNC: 13 MMOL/L — SIGNIFICANT CHANGE UP (ref 7–14)
APTT BLD: 27.3 SEC — SIGNIFICANT CHANGE UP (ref 27–36.3)
AST SERPL-CCNC: 20 U/L — SIGNIFICANT CHANGE UP (ref 4–32)
B PERT DNA SPEC QL NAA+PROBE: SIGNIFICANT CHANGE UP
B PERT+PARAPERT DNA PNL SPEC NAA+PROBE: SIGNIFICANT CHANGE UP
BASE EXCESS BLDV CALC-SCNC: 6.2 MMOL/L — HIGH (ref -2–3)
BASOPHILS # BLD AUTO: 0.08 K/UL — SIGNIFICANT CHANGE UP (ref 0–0.2)
BASOPHILS NFR BLD AUTO: 0.4 % — SIGNIFICANT CHANGE UP (ref 0–2)
BILIRUB SERPL-MCNC: 0.4 MG/DL — SIGNIFICANT CHANGE UP (ref 0.2–1.2)
BLOOD GAS VENOUS COMPREHENSIVE RESULT: SIGNIFICANT CHANGE UP
BORDETELLA PARAPERTUSSIS (RAPRVP): SIGNIFICANT CHANGE UP
BUN SERPL-MCNC: 13 MG/DL — SIGNIFICANT CHANGE UP (ref 7–23)
C PNEUM DNA SPEC QL NAA+PROBE: SIGNIFICANT CHANGE UP
CALCIUM SERPL-MCNC: 8.9 MG/DL — SIGNIFICANT CHANGE UP (ref 8.4–10.5)
CHLORIDE BLDV-SCNC: 99 MMOL/L — SIGNIFICANT CHANGE UP (ref 96–108)
CHLORIDE SERPL-SCNC: 97 MMOL/L — LOW (ref 98–107)
CO2 BLDV-SCNC: 34.4 MMOL/L — HIGH (ref 22–26)
CO2 SERPL-SCNC: 27 MMOL/L — SIGNIFICANT CHANGE UP (ref 22–31)
CREAT SERPL-MCNC: 0.68 MG/DL — SIGNIFICANT CHANGE UP (ref 0.5–1.3)
EGFR: 85 ML/MIN/1.73M2 — SIGNIFICANT CHANGE UP
EOSINOPHIL # BLD AUTO: 0.15 K/UL — SIGNIFICANT CHANGE UP (ref 0–0.5)
EOSINOPHIL NFR BLD AUTO: 0.8 % — SIGNIFICANT CHANGE UP (ref 0–6)
FLUAV SUBTYP SPEC NAA+PROBE: SIGNIFICANT CHANGE UP
FLUBV RNA SPEC QL NAA+PROBE: SIGNIFICANT CHANGE UP
GAS PNL BLDV: 133 MMOL/L — LOW (ref 136–145)
GAS PNL BLDV: SIGNIFICANT CHANGE UP
GLUCOSE BLDV-MCNC: 114 MG/DL — HIGH (ref 70–99)
GLUCOSE SERPL-MCNC: 112 MG/DL — HIGH (ref 70–99)
HADV DNA SPEC QL NAA+PROBE: SIGNIFICANT CHANGE UP
HCO3 BLDV-SCNC: 33 MMOL/L — HIGH (ref 22–29)
HCOV 229E RNA SPEC QL NAA+PROBE: SIGNIFICANT CHANGE UP
HCOV HKU1 RNA SPEC QL NAA+PROBE: SIGNIFICANT CHANGE UP
HCOV NL63 RNA SPEC QL NAA+PROBE: SIGNIFICANT CHANGE UP
HCOV OC43 RNA SPEC QL NAA+PROBE: SIGNIFICANT CHANGE UP
HCT VFR BLD CALC: 46.6 % — HIGH (ref 34.5–45)
HCT VFR BLDA CALC: 39 % — SIGNIFICANT CHANGE UP (ref 34.5–46.5)
HGB BLD CALC-MCNC: 13 G/DL — SIGNIFICANT CHANGE UP (ref 11.5–15.5)
HGB BLD-MCNC: 14.4 G/DL — SIGNIFICANT CHANGE UP (ref 11.5–15.5)
HMPV RNA SPEC QL NAA+PROBE: SIGNIFICANT CHANGE UP
HPIV1 RNA SPEC QL NAA+PROBE: SIGNIFICANT CHANGE UP
HPIV2 RNA SPEC QL NAA+PROBE: SIGNIFICANT CHANGE UP
HPIV3 RNA SPEC QL NAA+PROBE: SIGNIFICANT CHANGE UP
HPIV4 RNA SPEC QL NAA+PROBE: SIGNIFICANT CHANGE UP
IANC: 13.7 K/UL — HIGH (ref 1.8–7.4)
IMM GRANULOCYTES NFR BLD AUTO: 2.7 % — HIGH (ref 0–0.9)
INR BLD: 1.16 RATIO — SIGNIFICANT CHANGE UP (ref 0.88–1.16)
LACTATE BLDV-MCNC: 1.5 MMOL/L — SIGNIFICANT CHANGE UP (ref 0.5–2)
LYMPHOCYTES # BLD AUTO: 11.1 % — LOW (ref 13–44)
LYMPHOCYTES # BLD AUTO: 2 K/UL — SIGNIFICANT CHANGE UP (ref 1–3.3)
M PNEUMO DNA SPEC QL NAA+PROBE: SIGNIFICANT CHANGE UP
MCHC RBC-ENTMCNC: 27.3 PG — SIGNIFICANT CHANGE UP (ref 27–34)
MCHC RBC-ENTMCNC: 30.9 GM/DL — LOW (ref 32–36)
MCV RBC AUTO: 88.3 FL — SIGNIFICANT CHANGE UP (ref 80–100)
MONOCYTES # BLD AUTO: 1.52 K/UL — HIGH (ref 0–0.9)
MONOCYTES NFR BLD AUTO: 8.5 % — SIGNIFICANT CHANGE UP (ref 2–14)
NEUTROPHILS # BLD AUTO: 13.7 K/UL — HIGH (ref 1.8–7.4)
NEUTROPHILS NFR BLD AUTO: 76.5 % — SIGNIFICANT CHANGE UP (ref 43–77)
NRBC # BLD: 0 /100 WBCS — SIGNIFICANT CHANGE UP (ref 0–0)
NRBC # FLD: 0 K/UL — SIGNIFICANT CHANGE UP (ref 0–0)
PCO2 BLDV: 54 MMHG — HIGH (ref 39–42)
PH BLDV: 7.39 — SIGNIFICANT CHANGE UP (ref 7.32–7.43)
PLATELET # BLD AUTO: 324 K/UL — SIGNIFICANT CHANGE UP (ref 150–400)
PO2 BLDV: 48 MMHG — SIGNIFICANT CHANGE UP
POTASSIUM BLDV-SCNC: 6 MMOL/L — HIGH (ref 3.5–5.1)
POTASSIUM SERPL-MCNC: 4.4 MMOL/L — SIGNIFICANT CHANGE UP (ref 3.5–5.3)
POTASSIUM SERPL-SCNC: 4.4 MMOL/L — SIGNIFICANT CHANGE UP (ref 3.5–5.3)
PROT SERPL-MCNC: 7.1 G/DL — SIGNIFICANT CHANGE UP (ref 6–8.3)
PROTHROM AB SERPL-ACNC: 13.5 SEC — HIGH (ref 10.5–13.4)
RAPID RVP RESULT: DETECTED
RBC # BLD: 5.28 M/UL — HIGH (ref 3.8–5.2)
RBC # FLD: 14.4 % — SIGNIFICANT CHANGE UP (ref 10.3–14.5)
RSV RNA SPEC QL NAA+PROBE: SIGNIFICANT CHANGE UP
RV+EV RNA SPEC QL NAA+PROBE: DETECTED
SAO2 % BLDV: 80.6 % — SIGNIFICANT CHANGE UP
SARS-COV-2 RNA SPEC QL NAA+PROBE: SIGNIFICANT CHANGE UP
SODIUM SERPL-SCNC: 137 MMOL/L — SIGNIFICANT CHANGE UP (ref 135–145)
TROPONIN T, HIGH SENSITIVITY RESULT: 35 NG/L — SIGNIFICANT CHANGE UP
WBC # BLD: 17.94 K/UL — HIGH (ref 3.8–10.5)
WBC # FLD AUTO: 17.94 K/UL — HIGH (ref 3.8–10.5)

## 2022-11-11 PROCEDURE — 74177 CT ABD & PELVIS W/CONTRAST: CPT | Mod: 26,MA

## 2022-11-11 PROCEDURE — 99285 EMERGENCY DEPT VISIT HI MDM: CPT

## 2022-11-11 PROCEDURE — 71045 X-RAY EXAM CHEST 1 VIEW: CPT | Mod: 26

## 2022-11-11 PROCEDURE — 71260 CT THORAX DX C+: CPT | Mod: 26,MA

## 2022-11-11 RX ORDER — FAMOTIDINE 10 MG/ML
20 INJECTION INTRAVENOUS ONCE
Refills: 0 | Status: COMPLETED | OUTPATIENT
Start: 2022-11-11 | End: 2022-11-11

## 2022-11-11 RX ORDER — ACETAMINOPHEN 500 MG
975 TABLET ORAL ONCE
Refills: 0 | Status: COMPLETED | OUTPATIENT
Start: 2022-11-11 | End: 2022-11-11

## 2022-11-11 RX ADMIN — Medication 30 MILLILITER(S): at 21:18

## 2022-11-11 RX ADMIN — Medication 975 MILLIGRAM(S): at 21:18

## 2022-11-11 RX ADMIN — FAMOTIDINE 20 MILLIGRAM(S): 10 INJECTION INTRAVENOUS at 21:18

## 2022-11-11 NOTE — ED PROVIDER NOTE - CLINICAL SUMMARY MEDICAL DECISION MAKING FREE TEXT BOX
87-year-old female with past medical history of coronary artery disease, stents, cardiac arrest, hyperlipidemia, hypertension, diabetes, asthma, hypothyroidism, cholecystectomy, presenting to the emergency department as directed by her cardiologist for complaint of shortness of breath, chest pain, epigastric pain, and cough increasing over the course of the past 3 weeks.  Patient treated with antibiotics for presumed pneumonia 3 weeks ago without significant improvement. Exam as above. Concern for cardiac etiolgoy for symptoms, consider ACS, CHF, pna viral vs bacterial. Labs, imaging, symptom tx, will reassess. Likely admission.

## 2022-11-11 NOTE — ED PROVIDER NOTE - PHYSICAL EXAMINATION
Gen: Alert Ox2. (baseline). Weak appearing. Coughing on exam.   HEENT: Atraumatic. Mucous membranes moist.  CV: RRR. No significant LE edema.   Resp: Tachypneic. Scattered crackles bilat.   GI: Abdomen mildly ttp in epigastrium, otherwise non tender to palpation, soft.  Skin/MSK: No open wounds.   Neuro: EOMI. Pupils ERRL. Following commands.   Psych: Appropriate mood, cooperative

## 2022-11-11 NOTE — ED PROVIDER NOTE - ATTENDING CONTRIBUTION TO CARE
I have personally performed a face to face medical and diagnostic evaluation of the patient. I have discussed with and reviewed the Resident's note and agree with the History, ROS, Physical Exam and MDM unless otherwise indicated. A brief summary of my personal evaluation and impression can be found below.    James PERALTA: 87-year-old female with a history of CAD stent cardiac arrest hyperlipidemia hypertension diabetes asthma hypothyroidism cholecystectomy presents to the ED from her cardiologist with a chief complaint of shortness of breath chest pain epigastric pain and cough has been worsening over the last 3 weeks.  Patient was recently started on antibiotics for possible pneumonia 3 weeks ago and has not yet not improved.  Having persistent cough.  And epigastric chest pain that radiates to mid substernal area.  No new lower extremity swelling.  Having nausea but no vomiting.  No rash.  Per patient's daughter at bedside also notes patient complaining of urinary discomfort.  No hematuria.    All other ROS negative, except as above and as per HPI and ROS section.    VITALS: Initial triage and subsequent vitals have been reviewed by me.  GEN APPEARANCE: WDWN, alert, non-toxic, NAD  HEAD: Atraumatic.  EYES: PERRLa, EOMI, vision grossly intact.   NECK: Supple  CV: RRR, S1S2, no c/r/m/g. Cap refill <2 seconds. No bruits.   LUNGS: Crackles and rhonchi diffuse   ABDOMEN: Mild diffuse abdominal tenderness  MSK/EXT: No spinal or extremity point tenderness. No CVA ttp. Pelvis stable. No peripheral edema.  NEURO: Alert, follows commands. Weight bearing normal. Speech normal. Sensation and motor normal x4 extremities.   SKIN: Warm, dry and intact. No rash.  PSYCH: Appropriate    Plan/MDM: James PERALTA: 87-year-old female with a history of CAD stent cardiac arrest hyperlipidemia hypertension diabetes asthma hypothyroidism cholecystectomy presents to the ED from her cardiologist with a chief complaint of shortness of breath chest pain epigastric pain and cough has been worsening over the last 3 weeks.  Patient was recently started on antibiotics for possible pneumonia 3 weeks ago and has not yet not improved.  Having persistent cough.  And epigastric chest pain that radiates to mid substernal area.  No new lower extremity swelling. Vital signs stable ill appearing noted low-grade oral exam.  Not requiring oxygen.  With diffuse abdominal tenderness on exam and crackles on ascultation.  DDx concern for PNA versus URI versus less concern for intra-abdominal surgical pathology.  Less concern for ACS plan to check labs CXR cardiacs CT abdomen pelvis give meds as needed anticipate admission.

## 2022-11-11 NOTE — ED ADULT NURSE NOTE - HAVE YOU RECEIVED AT LEAST TWO PFIZER AND/OR MODERNA VACCINATIONS (IN ANY COMBINATION) AND/OR ONE JOHNSON & JOHNSON VACCINATION?
She asks me if I can cut her rings off  She has some swelling in both hands  I told her I could, however while as that she try a short course of a diuretic and then try again with soapy water  She will call if she has problems with that 
She follows up with Rheumatology  She is taking Plaquenil, Prozac and Procardia 
Yes

## 2022-11-11 NOTE — ED ADULT TRIAGE NOTE - TEMPERATURE IN CELSIUS (DEGREES C)
Ice, rest, elevate, Tylenol and ibuprofen over-the-counter as needed for pain.      Patient given 2 tablets of Percocet to use for pain management today.  Patient to contact his surgeon or primary care provider for further pain medication.    Patient to continue antibiotic as directed for cellulitis.    Patient to return to the emergency department if fevers, increased pain out of proportion, numbness, redness, swelling, or change in symptoms occur.   37.5

## 2022-11-11 NOTE — ED PROVIDER NOTE - NS ED ROS FT
Gen: Denies fever.   HEENT: Denies headache. Denies congestion.  CV: +chest pain. Denies lightheadedness.  Skin: Denies rash.   Resp: +SOB. +cough.  GI: Denies abd pain. Denies nausea. Denies vomiting. Denies diarrhea. Denies melena. Denies hematochezia.  Msk: Denies extremity swelling. Denies extremity pain.  : Denies dysuria. Denies hematuria.  Neuro: Denies LOC. Denies dizziness. Denies new numbness/tingling. Denies new focal weakness.  Psych: Denies SI

## 2022-11-11 NOTE — ED ADULT NURSE NOTE - OBJECTIVE STATEMENT
88y/o F BIBA from home with c/o SOB, CP, cough, weakness x 3 weeks, worsening. Pt reportedly was treated for PNA x 3 weeks ago, without improvement. Pt A&Ox2, which is reportedly her baseline. Family member @ bedside providing Hx. Denies n/v/d. IV access in place. Labs 88y/o F BIBA from home with c/o SOB, CP, cough, weakness x 3 weeks, worsening. Pt reportedly was treated for PNA x 3 weeks ago, without improvement. Pt A&Ox2, which is reportedly her baseline. Family member @ bedside providing Hx. Denies n/v/d. IV access in place. Labs collected & sent. Medicated as per MD order. Cardiac monitoring initiated. Safety maintained.

## 2022-11-11 NOTE — ED ADULT NURSE NOTE - CHIEF COMPLAINT QUOTE
Pt sent by PCP , dx  with PNA 3 weeks ago , on amoxicillin, not working, c/o chest pain, and sob.  Addendum:  Pt c/o increasing chest pain

## 2022-11-11 NOTE — ED PROVIDER NOTE - OBJECTIVE STATEMENT
87-year-old female with past medical history of coronary artery disease, stents, cardiac arrest, hyperlipidemia, hypertension, diabetes, asthma, hypothyroidism, cholecystectomy, presenting to the emergency department as directed by her cardiologist for complaint of shortness of breath, chest pain, epigastric pain, and cough increasing over the course of the past 3 weeks.  Patient treated with antibiotics for presumed pneumonia 3 weeks ago without significant improvement.  Also treated with albuterol inhaler and steroids for ongoing shortness of breath.  Today patient and daughter report lack of improvement.  In office today patient severely weak and physician with concern for pneumonia versus cardiac etiology for her symptoms, and directed to emergency department.  Patient ambulatory with cane at baseline, currently too weak and to ambulate independently.

## 2022-11-11 NOTE — ED ADULT TRIAGE NOTE - CHIEF COMPLAINT QUOTE
Pt sent by PCP , dx  with PNA 3 weeks ago , on amoxicillin, not working, c/o chest pain, and sob Pt sent by PCP , dx  with PNA 3 weeks ago , on amoxicillin, not working, c/o chest pain, and sob.  Addendum:  Pt c/o increasing chest pain

## 2022-11-12 DIAGNOSIS — E03.9 HYPOTHYROIDISM, UNSPECIFIED: ICD-10-CM

## 2022-11-12 DIAGNOSIS — J18.9 PNEUMONIA, UNSPECIFIED ORGANISM: ICD-10-CM

## 2022-11-12 DIAGNOSIS — I10 ESSENTIAL (PRIMARY) HYPERTENSION: ICD-10-CM

## 2022-11-12 DIAGNOSIS — J44.1 CHRONIC OBSTRUCTIVE PULMONARY DISEASE WITH (ACUTE) EXACERBATION: ICD-10-CM

## 2022-11-12 DIAGNOSIS — E78.5 HYPERLIPIDEMIA, UNSPECIFIED: ICD-10-CM

## 2022-11-12 DIAGNOSIS — I25.10 ATHEROSCLEROTIC HEART DISEASE OF NATIVE CORONARY ARTERY WITHOUT ANGINA PECTORIS: ICD-10-CM

## 2022-11-12 LAB
ANION GAP SERPL CALC-SCNC: 8 MMOL/L — SIGNIFICANT CHANGE UP (ref 7–14)
APPEARANCE UR: CLEAR — SIGNIFICANT CHANGE UP
BASOPHILS # BLD AUTO: 0.06 K/UL — SIGNIFICANT CHANGE UP (ref 0–0.2)
BASOPHILS NFR BLD AUTO: 0.4 % — SIGNIFICANT CHANGE UP (ref 0–2)
BILIRUB UR-MCNC: NEGATIVE — SIGNIFICANT CHANGE UP
BUN SERPL-MCNC: 15 MG/DL — SIGNIFICANT CHANGE UP (ref 7–23)
CALCIUM SERPL-MCNC: 8.6 MG/DL — SIGNIFICANT CHANGE UP (ref 8.4–10.5)
CHLORIDE SERPL-SCNC: 96 MMOL/L — LOW (ref 98–107)
CO2 SERPL-SCNC: 31 MMOL/L — SIGNIFICANT CHANGE UP (ref 22–31)
COLOR SPEC: YELLOW — SIGNIFICANT CHANGE UP
CREAT SERPL-MCNC: 0.84 MG/DL — SIGNIFICANT CHANGE UP (ref 0.5–1.3)
DIFF PNL FLD: NEGATIVE — SIGNIFICANT CHANGE UP
EGFR: 68 ML/MIN/1.73M2 — SIGNIFICANT CHANGE UP
EOSINOPHIL # BLD AUTO: 0.4 K/UL — SIGNIFICANT CHANGE UP (ref 0–0.5)
EOSINOPHIL NFR BLD AUTO: 2.4 % — SIGNIFICANT CHANGE UP (ref 0–6)
GLUCOSE SERPL-MCNC: 89 MG/DL — SIGNIFICANT CHANGE UP (ref 70–99)
GLUCOSE UR QL: NEGATIVE — SIGNIFICANT CHANGE UP
HCT VFR BLD CALC: 46.2 % — HIGH (ref 34.5–45)
HGB BLD-MCNC: 14.1 G/DL — SIGNIFICANT CHANGE UP (ref 11.5–15.5)
IANC: 11.69 K/UL — HIGH (ref 1.8–7.4)
IMM GRANULOCYTES NFR BLD AUTO: 2.6 % — HIGH (ref 0–0.9)
KETONES UR-MCNC: NEGATIVE — SIGNIFICANT CHANGE UP
LEUKOCYTE ESTERASE UR-ACNC: NEGATIVE — SIGNIFICANT CHANGE UP
LYMPHOCYTES # BLD AUTO: 15.1 % — SIGNIFICANT CHANGE UP (ref 13–44)
LYMPHOCYTES # BLD AUTO: 2.52 K/UL — SIGNIFICANT CHANGE UP (ref 1–3.3)
MAGNESIUM SERPL-MCNC: 2.4 MG/DL — SIGNIFICANT CHANGE UP (ref 1.6–2.6)
MCHC RBC-ENTMCNC: 27 PG — SIGNIFICANT CHANGE UP (ref 27–34)
MCHC RBC-ENTMCNC: 30.5 GM/DL — LOW (ref 32–36)
MCV RBC AUTO: 88.3 FL — SIGNIFICANT CHANGE UP (ref 80–100)
MONOCYTES # BLD AUTO: 1.55 K/UL — HIGH (ref 0–0.9)
MONOCYTES NFR BLD AUTO: 9.3 % — SIGNIFICANT CHANGE UP (ref 2–14)
NEUTROPHILS # BLD AUTO: 11.69 K/UL — HIGH (ref 1.8–7.4)
NEUTROPHILS NFR BLD AUTO: 70.2 % — SIGNIFICANT CHANGE UP (ref 43–77)
NITRITE UR-MCNC: NEGATIVE — SIGNIFICANT CHANGE UP
NRBC # BLD: 0 /100 WBCS — SIGNIFICANT CHANGE UP (ref 0–0)
NRBC # FLD: 0 K/UL — SIGNIFICANT CHANGE UP (ref 0–0)
PH UR: 6.5 — SIGNIFICANT CHANGE UP (ref 5–8)
PHOSPHATE SERPL-MCNC: 4 MG/DL — SIGNIFICANT CHANGE UP (ref 2.5–4.5)
PLATELET # BLD AUTO: 328 K/UL — SIGNIFICANT CHANGE UP (ref 150–400)
POTASSIUM SERPL-MCNC: 4.1 MMOL/L — SIGNIFICANT CHANGE UP (ref 3.5–5.3)
POTASSIUM SERPL-SCNC: 4.1 MMOL/L — SIGNIFICANT CHANGE UP (ref 3.5–5.3)
PROT UR-MCNC: ABNORMAL
RBC # BLD: 5.23 M/UL — HIGH (ref 3.8–5.2)
RBC # FLD: 14.4 % — SIGNIFICANT CHANGE UP (ref 10.3–14.5)
SODIUM SERPL-SCNC: 135 MMOL/L — SIGNIFICANT CHANGE UP (ref 135–145)
SP GR SPEC: 1.04 — SIGNIFICANT CHANGE UP (ref 1.01–1.05)
TROPONIN T, HIGH SENSITIVITY RESULT: 30 NG/L — SIGNIFICANT CHANGE UP
UROBILINOGEN FLD QL: SIGNIFICANT CHANGE UP
WBC # BLD: 16.66 K/UL — HIGH (ref 3.8–10.5)
WBC # FLD AUTO: 16.66 K/UL — HIGH (ref 3.8–10.5)

## 2022-11-12 RX ORDER — METOPROLOL TARTRATE 50 MG
25 TABLET ORAL DAILY
Refills: 0 | Status: DISCONTINUED | OUTPATIENT
Start: 2022-11-12 | End: 2022-11-17

## 2022-11-12 RX ORDER — CEFTRIAXONE 500 MG/1
1000 INJECTION, POWDER, FOR SOLUTION INTRAMUSCULAR; INTRAVENOUS EVERY 24 HOURS
Refills: 0 | Status: COMPLETED | OUTPATIENT
Start: 2022-11-12 | End: 2022-11-18

## 2022-11-12 RX ORDER — INFLUENZA VIRUS VACCINE 15; 15; 15; 15 UG/.5ML; UG/.5ML; UG/.5ML; UG/.5ML
0.7 SUSPENSION INTRAMUSCULAR ONCE
Refills: 0 | Status: DISCONTINUED | OUTPATIENT
Start: 2022-11-12 | End: 2022-11-19

## 2022-11-12 RX ORDER — PANTOPRAZOLE SODIUM 20 MG/1
40 TABLET, DELAYED RELEASE ORAL
Refills: 0 | Status: DISCONTINUED | OUTPATIENT
Start: 2022-11-12 | End: 2022-11-19

## 2022-11-12 RX ORDER — LEVOTHYROXINE SODIUM 125 MCG
88 TABLET ORAL DAILY
Refills: 0 | Status: DISCONTINUED | OUTPATIENT
Start: 2022-11-12 | End: 2022-11-19

## 2022-11-12 RX ORDER — ALBUTEROL 90 UG/1
2 AEROSOL, METERED ORAL EVERY 6 HOURS
Refills: 0 | Status: DISCONTINUED | OUTPATIENT
Start: 2022-11-12 | End: 2022-11-15

## 2022-11-12 RX ORDER — AMLODIPINE BESYLATE 2.5 MG/1
2.5 TABLET ORAL DAILY
Refills: 0 | Status: DISCONTINUED | OUTPATIENT
Start: 2022-11-12 | End: 2022-11-19

## 2022-11-12 RX ORDER — HYDROCORTISONE 20 MG
125 TABLET ORAL THREE TIMES A DAY
Refills: 0 | Status: DISCONTINUED | OUTPATIENT
Start: 2022-11-12 | End: 2022-11-13

## 2022-11-12 RX ORDER — TIOTROPIUM BROMIDE 18 UG/1
1 CAPSULE ORAL; RESPIRATORY (INHALATION) DAILY
Refills: 0 | Status: DISCONTINUED | OUTPATIENT
Start: 2022-11-12 | End: 2022-11-19

## 2022-11-12 RX ORDER — BUDESONIDE AND FORMOTEROL FUMARATE DIHYDRATE 160; 4.5 UG/1; UG/1
2 AEROSOL RESPIRATORY (INHALATION)
Refills: 0 | Status: DISCONTINUED | OUTPATIENT
Start: 2022-11-12 | End: 2022-11-19

## 2022-11-12 RX ORDER — ACETAMINOPHEN 500 MG
650 TABLET ORAL EVERY 6 HOURS
Refills: 0 | Status: DISCONTINUED | OUTPATIENT
Start: 2022-11-12 | End: 2022-11-19

## 2022-11-12 RX ORDER — ATORVASTATIN CALCIUM 80 MG/1
20 TABLET, FILM COATED ORAL AT BEDTIME
Refills: 0 | Status: DISCONTINUED | OUTPATIENT
Start: 2022-11-12 | End: 2022-11-19

## 2022-11-12 RX ORDER — METOPROLOL TARTRATE 50 MG
25 TABLET ORAL DAILY
Refills: 0 | Status: DISCONTINUED | OUTPATIENT
Start: 2022-11-12 | End: 2022-11-12

## 2022-11-12 RX ORDER — VANCOMYCIN HCL 1 G
1000 VIAL (EA) INTRAVENOUS ONCE
Refills: 0 | Status: COMPLETED | OUTPATIENT
Start: 2022-11-12 | End: 2022-11-12

## 2022-11-12 RX ORDER — AMLODIPINE BESYLATE 2.5 MG/1
2.5 TABLET ORAL DAILY
Refills: 0 | Status: DISCONTINUED | OUTPATIENT
Start: 2022-11-12 | End: 2022-11-12

## 2022-11-12 RX ORDER — PIPERACILLIN AND TAZOBACTAM 4; .5 G/20ML; G/20ML
3.38 INJECTION, POWDER, LYOPHILIZED, FOR SOLUTION INTRAVENOUS ONCE
Refills: 0 | Status: COMPLETED | OUTPATIENT
Start: 2022-11-12 | End: 2022-11-12

## 2022-11-12 RX ADMIN — Medication 125 MILLIGRAM(S): at 21:56

## 2022-11-12 RX ADMIN — Medication 30 MILLILITER(S): at 16:59

## 2022-11-12 RX ADMIN — TIOTROPIUM BROMIDE 1 CAPSULE(S): 18 CAPSULE ORAL; RESPIRATORY (INHALATION) at 13:27

## 2022-11-12 RX ADMIN — ATORVASTATIN CALCIUM 20 MILLIGRAM(S): 80 TABLET, FILM COATED ORAL at 21:55

## 2022-11-12 RX ADMIN — CEFTRIAXONE 100 MILLIGRAM(S): 500 INJECTION, POWDER, FOR SOLUTION INTRAMUSCULAR; INTRAVENOUS at 13:27

## 2022-11-12 RX ADMIN — PIPERACILLIN AND TAZOBACTAM 200 GRAM(S): 4; .5 INJECTION, POWDER, LYOPHILIZED, FOR SOLUTION INTRAVENOUS at 01:59

## 2022-11-12 RX ADMIN — ALBUTEROL 2 PUFF(S): 90 AEROSOL, METERED ORAL at 21:59

## 2022-11-12 RX ADMIN — ALBUTEROL 2 PUFF(S): 90 AEROSOL, METERED ORAL at 16:07

## 2022-11-12 RX ADMIN — Medication 125 MILLIGRAM(S): at 13:26

## 2022-11-12 RX ADMIN — BUDESONIDE AND FORMOTEROL FUMARATE DIHYDRATE 2 PUFF(S): 160; 4.5 AEROSOL RESPIRATORY (INHALATION) at 22:00

## 2022-11-12 RX ADMIN — Medication 250 MILLIGRAM(S): at 05:22

## 2022-11-12 NOTE — H&P ADULT - NSICDXPASTMEDICALHX_GEN_ALL_CORE_FT
PAST MEDICAL HISTORY:  CAD (coronary artery disease)     Cardiac arrest     COPD exacerbation     HTN (hypertension)     Hypothyroid

## 2022-11-12 NOTE — PROGRESS NOTE ADULT - ASSESSMENT
87-year-old female with underlying history significant for coronary artery disease s/p PCI stents LAD/RCA,, cardiac arrest, hyperlipidemia, hypertension, diabetes, chronic diastolic CHF, GERD, asthma, hypothyroidism, cholecystectomy who presented to ED via EMS    Sepsis due to RUL PNA  RSV infection  Acute Hypoxic Resp Failure due to RUL PNA and RSV infection  Chronic Diastolic CHF  hx CAD s/p MI PCI stents to LAD and RCA  DM, HTN, HLD  GERD    Telemetry  continue DAPT  IV antibiotics per Medical / ID team  Gentle IVF  PPI 87-year-old female with underlying history significant for coronary artery disease s/p PCI stents LAD/RCA,, cardiac arrest, hyperlipidemia, hypertension, diabetes, chronic diastolic CHF, GERD, asthma, hypothyroidism, cholecystectomy who presented to ED via EMS    Sepsis due to RUL PNA  RSV infection  Acute Hypoxic Resp Failure due to RUL PNA and RSV infection  Chronic Diastolic CHF  hx CAD s/p MI PCI stents to LAD and RCA  DM, HTN, HLD  GERD    Telemetry  resume home cardiac meds  IV antibiotics per Medical / ID team  Gentle IVF  PPI

## 2022-11-12 NOTE — H&P ADULT - HISTORY OF PRESENT ILLNESS
This is a 87-year-old female with past medical history of coronary artery disease, stents, cardiac arrest, hyperlipidemia, hypertension, diabetes, asthma, hypothyroidism, cholecystectomy, presenting to the emergency department as directed by her cardiologist for complaint of shortness of breath, chest pain, epigastric pain, and cough increasing over the course of the past 3 weeks.  Patient treated with antibiotics for presumed pneumonia 3 weeks ago without significant improvement. She was non compliant with Augmentin.  Also treated with albuterol inhaler and steroids for ongoing shortness of breath.  Today patient and daughter report lack of improvement.  In office today patient severely weak and physician with concern for pneumonia versus cardiac etiology for her symptoms, and directed to emergency department.  Patient ambulatory with cane at baseline, currently too weak and to ambulate independently.  She had a CT performed which shows RUL opacities.

## 2022-11-12 NOTE — PROGRESS NOTE ADULT - SUBJECTIVE AND OBJECTIVE BOX
cc SOB and cough x 3 weeks  87-year-old female sent from my office via EMS for severe SOB and cough.  She has history significant for coronary artery disease s/p PCI stents LAD/RCA,, cardiac arrest, hyperlipidemia, hypertension, diabetes, chronic diastolic CHF, GERD, asthma, hypothyroidism, cholecystectomy.  I sent pt to the emergency department from my office for progressively worsening shortness of breath, chest pain, epigastric pain, and cough over the course of the past 3 weeks.  Patient treated with antibiotics for presumed pneumonia 3 weeks ago without significant improvement. She was non compliant with Augmentin.  Also treated with albuterol inhaler and steroids for ongoing shortness of breath.   At baseline, patient ambulatory with cane, but now she currently too weak and to ambulate independently.  She had a CT performed which shows RUL opacities.     Review of Systems:   Other Review of Systems: All other 12 review of systems negative, except pleuritic chest pain, SOB, cough, weakness, fever, GERD      Allergies and Intolerances:        Allergies:  	No Known Allergies:     Home Medications:   * Outpatient Medication Status not yet specified  .    Patient History:    Past Medical, Past Surgical, and Family History:  PAST MEDICAL HISTORY:  CAD (coronary artery disease) chronic diastolic CHF; GERD DM    Cardiac arrest     COPD exacerbation     HTN (hypertension)     Hypothyroid.     Social History:  · Substance use	No     Tobacco Screening:  · Core Measure Site	No    Risk Assessment:    Present on Admission:  Deep Venous Thrombosis	no  Pulmonary Embolus	no     HIV Screening:  · In accordance with [place] State law, we offer every patient who comes to our ED an HIV test. Would you like to be tested today?	Unable to answer due to medical condition/unresponsive/etc...    Physical Exam:    Physical Exam:  · Constitutional	well-groomed; no distress  · ENMT	no gross abnormalities  · Respiratory	wheezes; rhonchi  · Rhonchi	upper L; lower L; upper R; middle R; lower R  · Wheezes	upper L; lower L; upper R; lower R  · Cardiovascular	regular rate and rhythm; S1 S2 present; no gallops; no rub; no murmur  · Gastrointestinal	soft; nontender; nondistended; normal active bowel sounds  · Neurological	cranial nerves II-XII intact; sensation intact  · Skin	warm and dry; color normal; no rashes; no ulcers  · Lymphatic	No lymphadedenopathy  · Musculoskeletal	normal; normal gait; ROM intact; strength 5/5 bilateral upper extremities; strength 5/5 bilateral lower extremities  · Psychiatric	normal affect; alert and oriented x3; normal behavior    WBC 13K    CT chest RUL infiltrates

## 2022-11-12 NOTE — PATIENT PROFILE ADULT - FUNCTIONAL ASSESSMENT - DAILY ACTIVITY 4.
[FreeTextEntry1] : - Discussed bicycling, massage, tummy time\par - Simethicone PRN\par - Would try prune juice 1/2 ounce, if no relief can try glycerin suppository (1/4 of pediatric)\par - Follow up for 1 month [facility]\par  4 = No assist / stand by assistance

## 2022-11-12 NOTE — PATIENT PROFILE ADULT - FALL HARM RISK - HARM RISK INTERVENTIONS

## 2022-11-13 LAB
ALBUMIN SERPL ELPH-MCNC: 3.6 G/DL — SIGNIFICANT CHANGE UP (ref 3.3–5)
ALP SERPL-CCNC: 84 U/L — SIGNIFICANT CHANGE UP (ref 40–120)
ALT FLD-CCNC: 18 U/L — SIGNIFICANT CHANGE UP (ref 4–33)
ANION GAP SERPL CALC-SCNC: 8 MMOL/L — SIGNIFICANT CHANGE UP (ref 7–14)
AST SERPL-CCNC: 12 U/L — SIGNIFICANT CHANGE UP (ref 4–32)
BASOPHILS # BLD AUTO: 0.04 K/UL — SIGNIFICANT CHANGE UP (ref 0–0.2)
BASOPHILS NFR BLD AUTO: 0.2 % — SIGNIFICANT CHANGE UP (ref 0–2)
BILIRUB SERPL-MCNC: 0.3 MG/DL — SIGNIFICANT CHANGE UP (ref 0.2–1.2)
BUN SERPL-MCNC: 15 MG/DL — SIGNIFICANT CHANGE UP (ref 7–23)
CALCIUM SERPL-MCNC: 9.4 MG/DL — SIGNIFICANT CHANGE UP (ref 8.4–10.5)
CHLORIDE SERPL-SCNC: 97 MMOL/L — LOW (ref 98–107)
CO2 SERPL-SCNC: 30 MMOL/L — SIGNIFICANT CHANGE UP (ref 22–31)
CREAT SERPL-MCNC: 0.77 MG/DL — SIGNIFICANT CHANGE UP (ref 0.5–1.3)
CULTURE RESULTS: SIGNIFICANT CHANGE UP
EGFR: 75 ML/MIN/1.73M2 — SIGNIFICANT CHANGE UP
EOSINOPHIL # BLD AUTO: 0 K/UL — SIGNIFICANT CHANGE UP (ref 0–0.5)
EOSINOPHIL NFR BLD AUTO: 0 % — SIGNIFICANT CHANGE UP (ref 0–6)
GLUCOSE SERPL-MCNC: 237 MG/DL — HIGH (ref 70–99)
HCT VFR BLD CALC: 45.7 % — HIGH (ref 34.5–45)
HGB BLD-MCNC: 14.4 G/DL — SIGNIFICANT CHANGE UP (ref 11.5–15.5)
IANC: 18.85 K/UL — HIGH (ref 1.8–7.4)
IMM GRANULOCYTES NFR BLD AUTO: 2.2 % — HIGH (ref 0–0.9)
LYMPHOCYTES # BLD AUTO: 1.25 K/UL — SIGNIFICANT CHANGE UP (ref 1–3.3)
LYMPHOCYTES # BLD AUTO: 6 % — LOW (ref 13–44)
MAGNESIUM SERPL-MCNC: 2.5 MG/DL — SIGNIFICANT CHANGE UP (ref 1.6–2.6)
MCHC RBC-ENTMCNC: 27.3 PG — SIGNIFICANT CHANGE UP (ref 27–34)
MCHC RBC-ENTMCNC: 31.5 GM/DL — LOW (ref 32–36)
MCV RBC AUTO: 86.6 FL — SIGNIFICANT CHANGE UP (ref 80–100)
MONOCYTES # BLD AUTO: 0.26 K/UL — SIGNIFICANT CHANGE UP (ref 0–0.9)
MONOCYTES NFR BLD AUTO: 1.2 % — LOW (ref 2–14)
NEUTROPHILS # BLD AUTO: 18.85 K/UL — HIGH (ref 1.8–7.4)
NEUTROPHILS NFR BLD AUTO: 90.4 % — HIGH (ref 43–77)
NRBC # BLD: 0 /100 WBCS — SIGNIFICANT CHANGE UP (ref 0–0)
NRBC # FLD: 0 K/UL — SIGNIFICANT CHANGE UP (ref 0–0)
PHOSPHATE SERPL-MCNC: 3.3 MG/DL — SIGNIFICANT CHANGE UP (ref 2.5–4.5)
PLATELET # BLD AUTO: 340 K/UL — SIGNIFICANT CHANGE UP (ref 150–400)
POTASSIUM SERPL-MCNC: 5 MMOL/L — SIGNIFICANT CHANGE UP (ref 3.5–5.3)
POTASSIUM SERPL-SCNC: 5 MMOL/L — SIGNIFICANT CHANGE UP (ref 3.5–5.3)
PROT SERPL-MCNC: 7.3 G/DL — SIGNIFICANT CHANGE UP (ref 6–8.3)
RBC # BLD: 5.28 M/UL — HIGH (ref 3.8–5.2)
RBC # FLD: 14.3 % — SIGNIFICANT CHANGE UP (ref 10.3–14.5)
SODIUM SERPL-SCNC: 135 MMOL/L — SIGNIFICANT CHANGE UP (ref 135–145)
SPECIMEN SOURCE: SIGNIFICANT CHANGE UP
T4 FREE SERPL-MCNC: 1.4 NG/DL — SIGNIFICANT CHANGE UP (ref 0.9–1.8)
TSH SERPL-MCNC: 1.78 UIU/ML — SIGNIFICANT CHANGE UP (ref 0.27–4.2)
WBC # BLD: 20.86 K/UL — HIGH (ref 3.8–10.5)
WBC # FLD AUTO: 20.86 K/UL — HIGH (ref 3.8–10.5)

## 2022-11-13 RX ORDER — MONTELUKAST 4 MG/1
10 TABLET, CHEWABLE ORAL DAILY
Refills: 0 | Status: DISCONTINUED | OUTPATIENT
Start: 2022-11-13 | End: 2022-11-19

## 2022-11-13 RX ORDER — ENOXAPARIN SODIUM 100 MG/ML
40 INJECTION SUBCUTANEOUS EVERY 24 HOURS
Refills: 0 | Status: DISCONTINUED | OUTPATIENT
Start: 2022-11-13 | End: 2022-11-19

## 2022-11-13 RX ORDER — POLYETHYLENE GLYCOL 3350 17 G/17G
17 POWDER, FOR SOLUTION ORAL DAILY
Refills: 0 | Status: DISCONTINUED | OUTPATIENT
Start: 2022-11-13 | End: 2022-11-14

## 2022-11-13 RX ORDER — BUDESONIDE AND FORMOTEROL FUMARATE DIHYDRATE 160; 4.5 UG/1; UG/1
2 AEROSOL RESPIRATORY (INHALATION)
Refills: 0 | Status: DISCONTINUED | OUTPATIENT
Start: 2022-11-13 | End: 2022-11-13

## 2022-11-13 RX ADMIN — CEFTRIAXONE 100 MILLIGRAM(S): 500 INJECTION, POWDER, FOR SOLUTION INTRAMUSCULAR; INTRAVENOUS at 12:37

## 2022-11-13 RX ADMIN — AMLODIPINE BESYLATE 2.5 MILLIGRAM(S): 2.5 TABLET ORAL at 05:31

## 2022-11-13 RX ADMIN — Medication 125 MILLIGRAM(S): at 05:31

## 2022-11-13 RX ADMIN — TIOTROPIUM BROMIDE 1 CAPSULE(S): 18 CAPSULE ORAL; RESPIRATORY (INHALATION) at 09:30

## 2022-11-13 RX ADMIN — ALBUTEROL 2 PUFF(S): 90 AEROSOL, METERED ORAL at 05:33

## 2022-11-13 RX ADMIN — PANTOPRAZOLE SODIUM 40 MILLIGRAM(S): 20 TABLET, DELAYED RELEASE ORAL at 05:30

## 2022-11-13 RX ADMIN — BUDESONIDE AND FORMOTEROL FUMARATE DIHYDRATE 2 PUFF(S): 160; 4.5 AEROSOL RESPIRATORY (INHALATION) at 21:29

## 2022-11-13 RX ADMIN — Medication 200 MILLIGRAM(S): at 17:27

## 2022-11-13 RX ADMIN — Medication 20 MILLIGRAM(S): at 21:34

## 2022-11-13 RX ADMIN — POLYETHYLENE GLYCOL 3350 17 GRAM(S): 17 POWDER, FOR SOLUTION ORAL at 17:28

## 2022-11-13 RX ADMIN — Medication 125 MILLIGRAM(S): at 12:37

## 2022-11-13 RX ADMIN — ATORVASTATIN CALCIUM 20 MILLIGRAM(S): 80 TABLET, FILM COATED ORAL at 21:29

## 2022-11-13 RX ADMIN — Medication 200 MILLIGRAM(S): at 21:33

## 2022-11-13 RX ADMIN — ALBUTEROL 2 PUFF(S): 90 AEROSOL, METERED ORAL at 17:28

## 2022-11-13 RX ADMIN — ALBUTEROL 2 PUFF(S): 90 AEROSOL, METERED ORAL at 09:29

## 2022-11-13 RX ADMIN — ALBUTEROL 2 PUFF(S): 90 AEROSOL, METERED ORAL at 21:29

## 2022-11-13 RX ADMIN — Medication 200 MILLIGRAM(S): at 14:17

## 2022-11-13 RX ADMIN — BUDESONIDE AND FORMOTEROL FUMARATE DIHYDRATE 2 PUFF(S): 160; 4.5 AEROSOL RESPIRATORY (INHALATION) at 09:30

## 2022-11-13 RX ADMIN — Medication 25 MILLIGRAM(S): at 05:31

## 2022-11-13 RX ADMIN — Medication 88 MICROGRAM(S): at 05:31

## 2022-11-13 NOTE — PROGRESS NOTE ADULT - ASSESSMENT
87-year-old female with underlying history significant for coronary artery disease s/p PCI stents LAD/RCA,, cardiac arrest, hyperlipidemia, hypertension, diabetes, chronic diastolic CHF, GERD, asthma, hypothyroidism, cholecystectomy who presented to ED via EMS    Sepsis due to RUL PNA  RSV infection  Acute Hypoxic Resp Failure due to RUL PNA and RSV infection  Chronic Diastolic CHF  hx CAD s/p MI PCI stents to LAD and RCA  DM, HTN, HLD  GERD    Telemetry  resume home cardiac meds  IV antibiotics per Medical / ID team  Gentle IVF  PPI     87-year-old female with underlying history significant for coronary artery disease s/p PCI stents LAD/RCA,, cardiac arrest, hyperlipidemia, hypertension, diabetes, chronic diastolic CHF, GERD, asthma, hypothyroidism, cholecystectomy who presented to ED via EMS    Chest pain --pleuritic type due to PNA  Sepsis due to RUL PNA  RSV infection  Acute Hypoxic Resp Failure due to RUL PNA and RSV infection  Chronic Diastolic CHF  hx CAD s/p MI PCI stents to LAD and RCA  DM, HTN, HLD  GERD    Telemetry  resume home cardiac meds  IV antibiotics per Medical / ID team  Gentle IVF  PPI

## 2022-11-13 NOTE — CONSULT NOTE ADULT - ASSESSMENT
This is a 87-year-old female with past medical history of coronary artery disease, stents, cardiac arrest, hyperlipidemia, hypertension, diabetes, asthma, hypothyroidism, cholecystectomy, presenting to the emergency department as directed by her cardiologist for complaint of shortness of breath, chest pain, epigastric pain, and cough increasing over the course of the past 3 weeks.  Patient treated with antibiotics for presumed pneumonia 3 weeks ago without significant improvement. She was non compliant with Augmentin.  Also treated with albuterol inhaler and steroids for ongoing shortness of breath.  Today patient and daughter report lack of improvement.  In office today patient severely weak and physician with concern for pneumonia versus cardiac etiology for her symptoms, and directed to emergency department.  Patient ambulatory with cane at baseline, currently too weak and to ambulate independently.  She had a CT performed which shows RUL opacities. (12 Nov 2022 10:51)     above noed:    she used to [employer] smoking in [place] before:  she is on Symbicort at home   never had covid infection:  and she has mild cough:

## 2022-11-13 NOTE — CONSULT NOTE ADULT - PROBLEM SELECTOR RECOMMENDATION 9
she used to [place] smoke:  also she had asthma  it is likely a combination of both:  change hydrocortisone to solumedrol:  and add Symbicort as well as Singulair:     she also has rul subtle opacities and is being treated fro pneumonia : check legionella and strept ag:

## 2022-11-13 NOTE — CONSULT NOTE ADULT - SUBJECTIVE AND OBJECTIVE BOX
11-13-22 @ 12:35    Patient is a 87y old  Female who presents with a chief complaint of SOB and cough (2022 17:04)      HPI:  This is a 87-year-old female with past medical history of coronary artery disease, stents, cardiac arrest, hyperlipidemia, hypertension, diabetes, asthma, hypothyroidism, cholecystectomy, presenting to the emergency department as directed by her cardiologist for complaint of shortness of breath, chest pain, epigastric pain, and cough increasing over the course of the past 3 weeks.  Patient treated with antibiotics for presumed pneumonia 3 weeks ago without significant improvement. She was non compliant with Augmentin.  Also treated with albuterol inhaler and steroids for ongoing shortness of breath.  Today patient and daughter report lack of improvement.  In office today patient severely weak and physician with concern for pneumonia versus cardiac etiology for her symptoms, and directed to emergency department.  Patient ambulatory with cane at baseline, currently too weak and to ambulate independently.  She had a CT performed which shows RUL opacities. (2022 10:51)     above noed:    she used to [employer] smoking in [place] before:  she is on Symbicort at home   never had covid infection:  and she has mild cough:     ?FOLLOWING PRESENT  [ x] Hx of PE/DVT, [ ]x Hx COPD, [x ] Hx of Asthma, [y ] Hx of Hospitalization, [x ]  Hx of BiPAP/CPAP use, [x ] Hx of BUFFY    Allergies    No Known Allergies    Intolerances        PAST MEDICAL & SURGICAL HISTORY:  Cardiac arrest      HTN (hypertension)      COPD exacerbation      CAD (coronary artery disease)      Hypothyroid          FAMILY HISTORY:      Social History: [[employer] smoking  ] TOBACCO                  [ x ] ETOH                                 [x  ] IVDA/DRUGS    REVIEW OF SYSTEMS      General:	x    Skin/Breast:x  	  Ophthalmologic:x  	  ENMT:	x    Respiratory and Thorax: sob, talbert , wheezing  	  Cardiovascular:	elina lower ribs pain     Gastrointestinal:	x    Genitourinary:	x    Musculoskeletal:	x    Neurological:	x    Psychiatric:	x    Hematology/Lymphatics:	x    Endocrine:	x    Allergic/Immunologic:	x    MEDICATIONS  (STANDING):  albuterol    90 MICROgram(s) HFA Inhaler 2 Puff(s) Inhalation every 6 hours  amLODIPine   Tablet 2.5 milliGRAM(s) Oral daily  atorvastatin 20 milliGRAM(s) Oral at bedtime  budesonide 160 MICROgram(s)/formoterol 4.5 MICROgram(s) Inhaler 2 Puff(s) Inhalation two times a day  cefTRIAXone   IVPB 1000 milliGRAM(s) IV Intermittent every 24 hours  hydrocortisone sodium succinate Injectable 125 milliGRAM(s) IV Push three times a day  influenza  Vaccine (HIGH DOSE) 0.7 milliLiter(s) IntraMuscular once  levothyroxine 88 MICROGram(s) Oral daily  metoprolol succinate ER 25 milliGRAM(s) Oral daily  pantoprazole    Tablet 40 milliGRAM(s) Oral before breakfast  tiotropium 18 MICROgram(s) Capsule 1 Capsule(s) Inhalation daily    MEDICATIONS  (PRN):  acetaminophen     Tablet .. 650 milliGRAM(s) Oral every 6 hours PRN Temp greater or equal to 38C (100.4F), Mild Pain (1 - 3)  aluminum hydroxide/magnesium hydroxide/simethicone Suspension 30 milliLiter(s) Oral every 4 hours PRN Dyspepsia  artificial  tears Solution 1 Drop(s) Both EYES three times a day PRN Dry Eyes  guaiFENesin Oral Liquid (Sugar-Free) 200 milliGRAM(s) Oral every 6 hours PRN Cough       Vital Signs Last 24 Hrs  T(C): 36.4 (2022 11:25), Max: 36.8 (2022 20:35)  T(F): 97.5 (2022 11:25), Max: 98.2 (2022 20:35)  HR: 96 (2022 11:25) (87 - 109)  BP: 131/73 (2022 11:25) (131/73 - 147/84)  BP(mean): --  RR: 18 (2022 11:25) (17 - 18)  SpO2: 97% (2022 11:25) (95% - 97%)    Parameters below as of 2022 11:25  Patient On (Oxygen Delivery Method): room air    Orthostatic VS          I&O's Summary      Physical Exam:   GENERAL: NAD, well-groomed, well-developed  HEENT: LEW/   Atraumatic, Normocephalic  ENMT: No tonsillar erythema, exudates, or enlargement; Moist mucous membranes, Good dentition, No lesions  NECK: Supple, No JVD, Normal thyroid  CHEST/LUNG: Mild wheezing  CVS: Regular rate and rhythm; No murmurs, rubs, or gallops  GI: : Soft, Nontender, Nondistended; Bowel sounds present  NERVOUS SYSTEM:  Alert & Oriented X3  EXTREMITIES: -edema  LYMPH: No lymphadenopathy noted  SKIN: No rashes or lesions  ENDOCRINOLOGY: No Thyromegaly  PSYCH: Appropriate    Labs:  Venous<54<4>>48<<7.395>>Venous<<3><<4><<5<<489>>SARS-CoV-2: NotDetec (2022 20:30)                              14.4   20.86 )-----------( 340      ( 2022 07:25 )             45.7                         14.1   16.66 )-----------( 328      ( 2022 07:28 )             46.2                         14.4   17.94 )-----------( 324      ( 2022 20:30 )             46.6         135  |  97<L>  |  15  ----------------------------<  237<H>  5.0   |  30  |  0.77      135  |  96<L>  |  15  ----------------------------<  89  4.1   |  31  |  0.84      137  |  97<L>  |  13  ----------------------------<  112<H>  4.4   |  27  |  0.68    Ca    9.4      2022 07:25  Ca    8.6      2022 07:28  Ca    8.9      2022 20:30  Phos  3.3     -  Phos  4.0     11-  Mg     2.50     11-13  Mg     2.40     11-12    TPro  7.3  /  Alb  3.6  /  TBili  0.3  /  DBili  x   /  AST  12  /  ALT  18  /  AlkPhos  84  11-13  TPro  7.1  /  Alb  3.4  /  TBili  0.4  /  DBili  x   /  AST  20  /  ALT  26  /  AlkPhos  81  11-11    CAPILLARY BLOOD GLUCOSE        LIVER FUNCTIONS - ( 2022 07:25 )  Alb: 3.6 g/dL / Pro: 7.3 g/dL / ALK PHOS: 84 U/L / ALT: 18 U/L / AST: 12 U/L / GGT: x           PT/INR - ( 2022 20:30 )   PT: 13.5 sec;   INR: 1.16 ratio         PTT - ( 2022 20:30 )  PTT:27.3 sec  Urinalysis Basic - ( 2022 23:50 )    Color: Yellow / Appearance: Clear / S.042 / pH: x  Gluc: x / Ketone: Negative  / Bili: Negative / Urobili: <2 mg/dL   Blood: x / Protein: Trace / Nitrite: Negative   Leuk Esterase: Negative / RBC: x / WBC x   Sq Epi: x / Non Sq Epi: x / Bacteria: x      Culture - Urine (collected 2022 23:50)  Source: Clean Catch Clean Catch (Midstream)  Final Report (2022 07:18):    <10,000 CFU/mL Normal Urogenital Barbara    Culture - Blood (collected 2022 20:55)  Source: .Blood Blood-Peripheral  Preliminary Report (2022 03:01):    No growth to date.    Culture - Blood (collected 2022 20:30)  Source: .Blood Blood-Peripheral  Preliminary Report (2022 03:01):    No growth to date.      D DImer  Serum Pro-Brain Natriuretic Peptide: 82 pg/mL ( @ 23:41)  Serum Pro-Brain Natriuretic Peptide: 112 pg/mL ( @ 20:30)      Studiesrad< from: CT Chest w/ IV Cont (22 @ 23:15) >  Sagittal and coronal reformats were performed.    FINDINGS :Images are degraded slightly by respiratory motion artifact.  CHEST:  LUNGS AND LARGE AIRWAYS: Patent central airways. Peripheral clustered   nodular opacities in the right upper lobe and lingula. Tubular left upper   lobe opacity, likely an impacted airway (2:31). Few scattered bilateral   subpleural nodules measuring up to 3 mm, likely intrapulmonary lymph   nodes. Right apical scarring/atelectasis.  PLEURA: Trace right pleural effusion. Left posterior fat-containing   Bochdalek hernia.  VESSELS: Atherosclerotic changes of the aorta and coronary arteries.  HEART: Heart size is normal. No pericardial effusion.  MEDIASTINUM AND ASHA: No lymphadenopathy.  CHEST WALL AND LOWER NECK: Within normal limits.    ABDOMEN AND PELVIS:  LIVER: Within normal limits.  BILE DUCTS: Nonspecific CBD dilatation and mild intrahepatic ductal   dilatation in the setting of prior cholecystectomy.  GALLBLADDER: Cholecystectomy.  SPLEEN: Within normal limits.  PANCREAS: Within normal limits.  ADRENALS: Within normal limits.  KIDNEYS/URETERS: No hydronephrosis. Subcentimeter hypodensities   bilaterally, too small to characterize.    BLADDER: Within normal limits.  REPRODUCTIVE ORGANS: Uterus and adnexa within normal limits.    BOWEL: No bowel obstruction. Portions of the colon are degraded by motion   artifact. Appendix is normal.  PERITONEUM: No ascites.  VESSELS: Atherosclerotic changes.  RETROPERITONEUM/LYMPH NODES: Nolymphadenopathy.  ABDOMINAL WALL: Small fat-containing right inguinal hernia.  BONES: Degenerative changes.    IMPRESSION: Motion degraded exam.  Clustered nodular opacities in the right upper lobe and lingula may be   infectious/inflammatory in nature.    Trace right pleural effusion.    No acute abdominal pathology.    --- End of Report ---        < end of copied text >    Chest X-RAY  CT SCAN Chest   CT Abdomen  Venous Dopplers: LE:   Others

## 2022-11-13 NOTE — PROGRESS NOTE ADULT - SUBJECTIVE AND OBJECTIVE BOX
S/  Pt still with SOB and cough  Mild improvement  +pleuritic chest pain    ROS: GERD and epigastric pain    MEDICATIONS  (STANDING):  albuterol    90 MICROgram(s) HFA Inhaler 2 Puff(s) Inhalation every 6 hours  amLODIPine   Tablet 2.5 milliGRAM(s) Oral daily  atorvastatin 20 milliGRAM(s) Oral at bedtime  budesonide 160 MICROgram(s)/formoterol 4.5 MICROgram(s) Inhaler 2 Puff(s) Inhalation two times a day  cefTRIAXone   IVPB 1000 milliGRAM(s) IV Intermittent every 24 hours  hydrocortisone sodium succinate Injectable 125 milliGRAM(s) IV Push three times a day  influenza  Vaccine (HIGH DOSE) 0.7 milliLiter(s) IntraMuscular once  levothyroxine 88 MICROGram(s) Oral daily  metoprolol succinate ER 25 milliGRAM(s) Oral daily  pantoprazole    Tablet 40 milliGRAM(s) Oral before breakfast  tiotropium 18 MICROgram(s) Capsule 1 Capsule(s) Inhalation daily    MEDICATIONS  (PRN):  acetaminophen     Tablet .. 650 milliGRAM(s) Oral every 6 hours PRN Temp greater or equal to 38C (100.4F), Mild Pain (1 - 3)  aluminum hydroxide/magnesium hydroxide/simethicone Suspension 30 milliLiter(s) Oral every 4 hours PRN Dyspepsia  artificial  tears Solution 1 Drop(s) Both EYES three times a day PRN Dry Eyes  guaiFENesin Oral Liquid (Sugar-Free) 200 milliGRAM(s) Oral every 6 hours PRN Cough    O/       Vital Signs Last 24 Hrs  T(C): 36.4 (13 Nov 2022 11:25), Max: 36.8 (12 Nov 2022 20:35)  T(F): 97.5 (13 Nov 2022 11:25), Max: 98.2 (12 Nov 2022 20:35)  HR: 96 (13 Nov 2022 11:25) (87 - 109)  BP: 131/73 (13 Nov 2022 11:25) (131/73 - 147/84)  BP(mean): --  RR: 18 (13 Nov 2022 11:25) (17 - 18)  SpO2: 97% (13 Nov 2022 11:25) (95% - 97%)    Parameters below as of 13 Nov 2022 11:25  Patient On (Oxygen Delivery Method): room air    Orthostatic VS          I&O's Summary      Physical Exam:   GENERAL: NAD, well-groomed, well-developed  HEENT: LEW/   Atraumatic, Normocephalic  ENMT: No tonsillar erythema, exudates, or enlargement; Moist mucous membranes, Good dentition, No lesions  NECK: Supple, No JVD, Normal thyroid  CHEST/LUNG: Mild wheezing; +rhonchi  CVS: Regular rate and rhythm; No murmurs, rubs, or gallops  GI: : Soft, +epigastric tender, Nondistended; Bowel sounds present  NERVOUS SYSTEM:  Alert & Oriented X3  EXTREMITIES: -edema  LYMPH: No lymphadenopathy noted  SKIN: No rashes or lesions  ENDOCRINOLOGY: No Thyromegaly  PSYCH: Appropriate    Labs:  Venous<54<4>>48<<7.395>>Venous<<3><<4><<5<<489>>SARS-CoV-2: NotDetec (11 Nov 2022 20:30)                              14.4   20.86 )-----------( 340      ( 13 Nov 2022 07:25 )             45.7                         14.1   16.66 )-----------( 328      ( 12 Nov 2022 07:28 )             46.2                         14.4   17.94 )-----------( 324      ( 11 Nov 2022 20:30 )             46.6     11-13    135  |  97<L>  |  15  ----------------------------<  237<H>  5.0   |  30  |  0.77  11-12    135  |  96<L>  |  15  ----------------------------<  89  4.1   |  31  |  0.84  11-11    137  |  97<L>  |  13  ----------------------------<  112<H>  4.4   |  27  |  0.68    Ca    9.4      13 Nov 2022 07:25  Ca    8.6      12 Nov 2022 07:28  Ca    8.9      11 Nov 2022 20:30  Phos  3.3     11-13  Phos  4.0     11-12  Mg     2.50     11-13  Mg     2.40     11-12    TPro  7.3  /  Alb  3.6  /  TBili  0.3  /  DBili  x   /  AST  12  /  ALT  18  /  AlkPhos  84  11-13  TPro  7.1  /  Alb  3.4  /  TBili  0.4  /  DBili  x   /  AST  20  /  ALT  26  /  AlkPhos  81  11-11

## 2022-11-13 NOTE — PROGRESS NOTE ADULT - SUBJECTIVE AND OBJECTIVE BOX
Patient is a 87y old  Female who presents with a chief complaint of SOB and cough (2022 14:21)      HPI:  Cough ++, dyspnea persists but is better.    PAST MEDICAL & SURGICAL HISTORY:  Cardiac arrest      HTN (hypertension)      COPD exacerbation      CAD (coronary artery disease)      Hypothyroid          Review of Systems:   CONSTITUTIONAL: No fever, weight loss, or fatigue  EYES: No eye pain, visual disturbances, or discharge  ENMT:  No difficulty hearing, tinnitus, vertigo; No sinus or throat pain  NECK: No pain or stiffness  BREASTS: No pain, masses, or nipple discharge  RESPIRATORY: No wheezing, chills or hemoptysis;   CARDIOVASCULAR: No chest pain, palpitations, dizziness, or leg swelling  GASTROINTESTINAL: No abdominal or epigastric pain. No nausea, vomiting, or hematemesis; No diarrhea or constipation. No melena or hematochezia.  GENITOURINARY: No dysuria, frequency, hematuria, or incontinence  NEUROLOGICAL: No headaches, memory loss, loss of strength, numbness, or tremors  SKIN: No itching, burning, rashes, or lesions   LYMPH NODES: No enlarged glands  ENDOCRINE: No heat or cold intolerance; No hair loss  MUSCULOSKELETAL: No joint pain or swelling; No muscle, back, or extremity pain  PSYCHIATRIC: No depression, anxiety, mood swings, or difficulty sleeping  HEME/LYMPH: No easy bruising, or bleeding gums  ALLERY AND IMMUNOLOGIC: No hives or eczema    Allergies    No Known Allergies    Intolerances        Social History:     FAMILY HISTORY:      MEDICATIONS  (STANDING):  albuterol    90 MICROgram(s) HFA Inhaler 2 Puff(s) Inhalation every 6 hours  amLODIPine   Tablet 2.5 milliGRAM(s) Oral daily  atorvastatin 20 milliGRAM(s) Oral at bedtime  budesonide 160 MICROgram(s)/formoterol 4.5 MICROgram(s) Inhaler 2 Puff(s) Inhalation two times a day  budesonide 160 MICROgram(s)/formoterol 4.5 MICROgram(s) Inhaler 2 Puff(s) Inhalation two times a day  cefTRIAXone   IVPB 1000 milliGRAM(s) IV Intermittent every 24 hours  enoxaparin Injectable 40 milliGRAM(s) SubCutaneous every 24 hours  influenza  Vaccine (HIGH DOSE) 0.7 milliLiter(s) IntraMuscular once  levothyroxine 88 MICROGram(s) Oral daily  methylPREDNISolone sodium succinate Injectable 20 milliGRAM(s) IV Push every 8 hours  metoprolol succinate ER 25 milliGRAM(s) Oral daily  montelukast 10 milliGRAM(s) Oral daily  pantoprazole    Tablet 40 milliGRAM(s) Oral before breakfast  polyethylene glycol 3350 17 Gram(s) Oral daily  tiotropium 18 MICROgram(s) Capsule 1 Capsule(s) Inhalation daily    MEDICATIONS  (PRN):  acetaminophen     Tablet .. 650 milliGRAM(s) Oral every 6 hours PRN Temp greater or equal to 38C (100.4F), Mild Pain (1 - 3)  aluminum hydroxide/magnesium hydroxide/simethicone Suspension 30 milliLiter(s) Oral every 4 hours PRN Dyspepsia  artificial  tears Solution 1 Drop(s) Both EYES three times a day PRN Dry Eyes  guaiFENesin Oral Liquid (Sugar-Free) 200 milliGRAM(s) Oral every 6 hours PRN Cough        CAPILLARY BLOOD GLUCOSE        I&O's Summary      PHYSICAL EXAM:  Vital Signs Last 24 Hrs  T(C): 36.4 (2022 11:25), Max: 36.8 (2022 20:35)  T(F): 97.5 (2022 11:25), Max: 98.2 (2022 20:35)  HR: 96 (2022 11:25) (87 - 109)  BP: 131/73 (2022 11:25) (131/73 - 147/84)  BP(mean): --  RR: 18 (2022 11:25) (17 - 18)  SpO2: 97% (2022 11:25) (95% - 97%)    Parameters below as of 2022 11:25  Patient On (Oxygen Delivery Method): room air        GENERAL: NAD, well-developed  HEAD:  Atraumatic, Normocephalic  EYES: EOMI, PERRLA, conjunctiva and sclera clear  NECK: Supple, No JVD  CHEST/LUNG: Clear to auscultation bilaterally; No wheeze  HEART: Regular rate and rhythm; No murmurs, rubs, or gallops  ABDOMEN: Soft, Nontender, Nondistended; Bowel sounds present  EXTREMITIES:  2+ Peripheral Pulses, No clubbing, cyanosis, or edema  PSYCH: AAOx3  NEUROLOGY: non-focal  SKIN: No rashes or lesions    LABS:                        14.4   20.86 )-----------( 340      ( 2022 07:25 )             45.7         135  |  97<L>  |  15  ----------------------------<  237<H>  5.0   |  30  |  0.77    Ca    9.4      2022 07:25  Phos  3.3       Mg     2.50         TPro  7.3  /  Alb  3.6  /  TBili  0.3  /  DBili  x   /  AST  12  /  ALT  18  /  AlkPhos  84      PT/INR - ( 2022 20:30 )   PT: 13.5 sec;   INR: 1.16 ratio         PTT - ( 2022 20:30 )  PTT:27.3 sec      Urinalysis Basic - ( 2022 23:50 )    Color: Yellow / Appearance: Clear / S.042 / pH: x  Gluc: x / Ketone: Negative  / Bili: Negative / Urobili: <2 mg/dL   Blood: x / Protein: Trace / Nitrite: Negative   Leuk Esterase: Negative / RBC: x / WBC x   Sq Epi: x / Non Sq Epi: x / Bacteria: x        RADIOLOGY & ADDITIONAL TESTS:    Imaging Personally Reviewed:    Consultant(s) Notes Reviewed:      Care Discussed with Consultants/Other Providers:

## 2022-11-14 LAB
A1C WITH ESTIMATED AVERAGE GLUCOSE RESULT: 6.5 % — HIGH (ref 4–5.6)
ANION GAP SERPL CALC-SCNC: 11 MMOL/L — SIGNIFICANT CHANGE UP (ref 7–14)
ANION GAP SERPL CALC-SCNC: 12 MMOL/L — SIGNIFICANT CHANGE UP (ref 7–14)
ANISOCYTOSIS BLD QL: SLIGHT — SIGNIFICANT CHANGE UP
BASOPHILS # BLD AUTO: 0 K/UL — SIGNIFICANT CHANGE UP (ref 0–0.2)
BASOPHILS NFR BLD AUTO: 0 % — SIGNIFICANT CHANGE UP (ref 0–2)
BUN SERPL-MCNC: 20 MG/DL — SIGNIFICANT CHANGE UP (ref 7–23)
BUN SERPL-MCNC: 23 MG/DL — SIGNIFICANT CHANGE UP (ref 7–23)
CALCIUM SERPL-MCNC: 8.5 MG/DL — SIGNIFICANT CHANGE UP (ref 8.4–10.5)
CALCIUM SERPL-MCNC: 8.8 MG/DL — SIGNIFICANT CHANGE UP (ref 8.4–10.5)
CHLORIDE SERPL-SCNC: 96 MMOL/L — LOW (ref 98–107)
CHLORIDE SERPL-SCNC: 99 MMOL/L — SIGNIFICANT CHANGE UP (ref 98–107)
CK SERPL-CCNC: 17 U/L — LOW (ref 25–170)
CO2 SERPL-SCNC: 25 MMOL/L — SIGNIFICANT CHANGE UP (ref 22–31)
CO2 SERPL-SCNC: 26 MMOL/L — SIGNIFICANT CHANGE UP (ref 22–31)
CREAT SERPL-MCNC: 0.71 MG/DL — SIGNIFICANT CHANGE UP (ref 0.5–1.3)
CREAT SERPL-MCNC: 0.77 MG/DL — SIGNIFICANT CHANGE UP (ref 0.5–1.3)
EGFR: 75 ML/MIN/1.73M2 — SIGNIFICANT CHANGE UP
EGFR: 83 ML/MIN/1.73M2 — SIGNIFICANT CHANGE UP
ELLIPTOCYTES BLD QL SMEAR: SLIGHT — SIGNIFICANT CHANGE UP
EOSINOPHIL # BLD AUTO: 0 K/UL — SIGNIFICANT CHANGE UP (ref 0–0.5)
EOSINOPHIL NFR BLD AUTO: 0 % — SIGNIFICANT CHANGE UP (ref 0–6)
ESTIMATED AVERAGE GLUCOSE: 140 — SIGNIFICANT CHANGE UP
GLUCOSE SERPL-MCNC: 155 MG/DL — HIGH (ref 70–99)
GLUCOSE SERPL-MCNC: 215 MG/DL — HIGH (ref 70–99)
HCT VFR BLD CALC: 41.1 % — SIGNIFICANT CHANGE UP (ref 34.5–45)
HCT VFR BLD CALC: 42.9 % — SIGNIFICANT CHANGE UP (ref 34.5–45)
HGB BLD-MCNC: 13 G/DL — SIGNIFICANT CHANGE UP (ref 11.5–15.5)
HGB BLD-MCNC: 13.2 G/DL — SIGNIFICANT CHANGE UP (ref 11.5–15.5)
HYPOCHROMIA BLD QL: SLIGHT — SIGNIFICANT CHANGE UP
IANC: 24.82 K/UL — HIGH (ref 1.8–7.4)
LEGIONELLA AG UR QL: NEGATIVE — SIGNIFICANT CHANGE UP
LYMPHOCYTES # BLD AUTO: 0.73 K/UL — LOW (ref 1–3.3)
LYMPHOCYTES # BLD AUTO: 2.6 % — LOW (ref 13–44)
MACROCYTES BLD QL: SLIGHT — SIGNIFICANT CHANGE UP
MAGNESIUM SERPL-MCNC: 2.3 MG/DL — SIGNIFICANT CHANGE UP (ref 1.6–2.6)
MAGNESIUM SERPL-MCNC: 2.4 MG/DL — SIGNIFICANT CHANGE UP (ref 1.6–2.6)
MANUAL SMEAR VERIFICATION: SIGNIFICANT CHANGE UP
MCHC RBC-ENTMCNC: 27 PG — SIGNIFICANT CHANGE UP (ref 27–34)
MCHC RBC-ENTMCNC: 27.1 PG — SIGNIFICANT CHANGE UP (ref 27–34)
MCHC RBC-ENTMCNC: 30.8 GM/DL — LOW (ref 32–36)
MCHC RBC-ENTMCNC: 31.6 GM/DL — LOW (ref 32–36)
MCV RBC AUTO: 85.6 FL — SIGNIFICANT CHANGE UP (ref 80–100)
MCV RBC AUTO: 87.7 FL — SIGNIFICANT CHANGE UP (ref 80–100)
MICROCYTES BLD QL: SLIGHT — SIGNIFICANT CHANGE UP
MONOCYTES # BLD AUTO: 0.73 K/UL — SIGNIFICANT CHANGE UP (ref 0–0.9)
MONOCYTES NFR BLD AUTO: 2.6 % — SIGNIFICANT CHANGE UP (ref 2–14)
MYELOCYTES NFR BLD: 0.9 % — HIGH (ref 0–0)
NEUTROPHILS # BLD AUTO: 25.27 K/UL — HIGH (ref 1.8–7.4)
NEUTROPHILS NFR BLD AUTO: 89.5 % — HIGH (ref 43–77)
NRBC # BLD: 0 /100 WBCS — SIGNIFICANT CHANGE UP (ref 0–0)
NRBC # FLD: 0 K/UL — SIGNIFICANT CHANGE UP (ref 0–0)
OVALOCYTES BLD QL SMEAR: SLIGHT — SIGNIFICANT CHANGE UP
PHOSPHATE SERPL-MCNC: 3.4 MG/DL — SIGNIFICANT CHANGE UP (ref 2.5–4.5)
PHOSPHATE SERPL-MCNC: 3.7 MG/DL — SIGNIFICANT CHANGE UP (ref 2.5–4.5)
PLAT MORPH BLD: NORMAL — SIGNIFICANT CHANGE UP
PLATELET # BLD AUTO: 345 K/UL — SIGNIFICANT CHANGE UP (ref 150–400)
PLATELET # BLD AUTO: 358 K/UL — SIGNIFICANT CHANGE UP (ref 150–400)
PLATELET COUNT - ESTIMATE: NORMAL — SIGNIFICANT CHANGE UP
POIKILOCYTOSIS BLD QL AUTO: SLIGHT — SIGNIFICANT CHANGE UP
POTASSIUM SERPL-MCNC: 4 MMOL/L — SIGNIFICANT CHANGE UP (ref 3.5–5.3)
POTASSIUM SERPL-MCNC: 4.2 MMOL/L — SIGNIFICANT CHANGE UP (ref 3.5–5.3)
POTASSIUM SERPL-SCNC: 4 MMOL/L — SIGNIFICANT CHANGE UP (ref 3.5–5.3)
POTASSIUM SERPL-SCNC: 4.2 MMOL/L — SIGNIFICANT CHANGE UP (ref 3.5–5.3)
RBC # BLD: 4.8 M/UL — SIGNIFICANT CHANGE UP (ref 3.8–5.2)
RBC # BLD: 4.89 M/UL — SIGNIFICANT CHANGE UP (ref 3.8–5.2)
RBC # FLD: 14.2 % — SIGNIFICANT CHANGE UP (ref 10.3–14.5)
RBC # FLD: 14.2 % — SIGNIFICANT CHANGE UP (ref 10.3–14.5)
RBC BLD AUTO: ABNORMAL
SODIUM SERPL-SCNC: 133 MMOL/L — LOW (ref 135–145)
SODIUM SERPL-SCNC: 136 MMOL/L — SIGNIFICANT CHANGE UP (ref 135–145)
TARGETS BLD QL SMEAR: SLIGHT — SIGNIFICANT CHANGE UP
VARIANT LYMPHS # BLD: 4.4 % — SIGNIFICANT CHANGE UP (ref 0–6)
WBC # BLD: 21.85 K/UL — HIGH (ref 3.8–10.5)
WBC # BLD: 28.23 K/UL — HIGH (ref 3.8–10.5)
WBC # FLD AUTO: 21.85 K/UL — HIGH (ref 3.8–10.5)
WBC # FLD AUTO: 28.23 K/UL — HIGH (ref 3.8–10.5)

## 2022-11-14 PROCEDURE — 93010 ELECTROCARDIOGRAM REPORT: CPT

## 2022-11-14 RX ORDER — IPRATROPIUM/ALBUTEROL SULFATE 18-103MCG
3 AEROSOL WITH ADAPTER (GRAM) INHALATION EVERY 6 HOURS
Refills: 0 | Status: DISCONTINUED | OUTPATIENT
Start: 2022-11-14 | End: 2022-11-14

## 2022-11-14 RX ORDER — SODIUM CHLORIDE 9 MG/ML
500 INJECTION INTRAMUSCULAR; INTRAVENOUS; SUBCUTANEOUS
Refills: 0 | Status: DISCONTINUED | OUTPATIENT
Start: 2022-11-14 | End: 2022-11-16

## 2022-11-14 RX ADMIN — Medication 200 MILLIGRAM(S): at 08:52

## 2022-11-14 RX ADMIN — BUDESONIDE AND FORMOTEROL FUMARATE DIHYDRATE 2 PUFF(S): 160; 4.5 AEROSOL RESPIRATORY (INHALATION) at 08:54

## 2022-11-14 RX ADMIN — ATORVASTATIN CALCIUM 20 MILLIGRAM(S): 80 TABLET, FILM COATED ORAL at 22:14

## 2022-11-14 RX ADMIN — BUDESONIDE AND FORMOTEROL FUMARATE DIHYDRATE 2 PUFF(S): 160; 4.5 AEROSOL RESPIRATORY (INHALATION) at 22:10

## 2022-11-14 RX ADMIN — ALBUTEROL 2 PUFF(S): 90 AEROSOL, METERED ORAL at 22:11

## 2022-11-14 RX ADMIN — TIOTROPIUM BROMIDE 1 CAPSULE(S): 18 CAPSULE ORAL; RESPIRATORY (INHALATION) at 11:46

## 2022-11-14 RX ADMIN — Medication 40 MILLIGRAM(S): at 14:00

## 2022-11-14 RX ADMIN — PANTOPRAZOLE SODIUM 40 MILLIGRAM(S): 20 TABLET, DELAYED RELEASE ORAL at 05:28

## 2022-11-14 RX ADMIN — CEFTRIAXONE 100 MILLIGRAM(S): 500 INJECTION, POWDER, FOR SOLUTION INTRAMUSCULAR; INTRAVENOUS at 13:20

## 2022-11-14 RX ADMIN — ENOXAPARIN SODIUM 40 MILLIGRAM(S): 100 INJECTION SUBCUTANEOUS at 05:28

## 2022-11-14 RX ADMIN — Medication 25 MILLIGRAM(S): at 05:28

## 2022-11-14 RX ADMIN — Medication 20 MILLIGRAM(S): at 05:28

## 2022-11-14 RX ADMIN — Medication 88 MICROGRAM(S): at 05:29

## 2022-11-14 RX ADMIN — MONTELUKAST 10 MILLIGRAM(S): 4 TABLET, CHEWABLE ORAL at 11:46

## 2022-11-14 RX ADMIN — ALBUTEROL 2 PUFF(S): 90 AEROSOL, METERED ORAL at 05:43

## 2022-11-14 RX ADMIN — AMLODIPINE BESYLATE 2.5 MILLIGRAM(S): 2.5 TABLET ORAL at 05:28

## 2022-11-14 RX ADMIN — Medication 100 MILLIGRAM(S): at 22:24

## 2022-11-14 RX ADMIN — SODIUM CHLORIDE 50 MILLILITER(S): 9 INJECTION INTRAMUSCULAR; INTRAVENOUS; SUBCUTANEOUS at 20:37

## 2022-11-14 RX ADMIN — ALBUTEROL 2 PUFF(S): 90 AEROSOL, METERED ORAL at 17:25

## 2022-11-14 RX ADMIN — Medication 40 MILLIGRAM(S): at 22:11

## 2022-11-14 RX ADMIN — ALBUTEROL 2 PUFF(S): 90 AEROSOL, METERED ORAL at 11:46

## 2022-11-14 NOTE — PROGRESS NOTE ADULT - ASSESSMENT
This is a 87-year-old female with past medical history of coronary artery disease, stents, cardiac arrest, hyperlipidemia, hypertension, diabetes, asthma, hypothyroidism, cholecystectomy, presenting to the emergency department as directed by her cardiologist for complaint of shortness of breath, chest pain, epigastric pain, and cough increasing over the course of the past 3 weeks.  Patient treated with antibiotics for presumed pneumonia 3 weeks ago without significant improvement. She was non compliant with Augmentin.  Also treated with albuterol inhaler and steroids for ongoing shortness of breath.  Today patient and daughter report lack of improvement.  In office today patient severely weak and physician with concern for pneumonia versus cardiac etiology for her symptoms, and directed to emergency department.  Patient ambulatory with cane at baseline, currently too weak and to ambulate independently.  She had a CT performed which shows RUL opacities. (12 Nov 2022 10:51)     above noed:    she used to [employer] smoking in [place] before:  she is on Symbicort at home   never had covid infection:  and she has mild cough:

## 2022-11-14 NOTE — PROGRESS NOTE ADULT - SUBJECTIVE AND OBJECTIVE BOX
Date of Service: 11-14-22 @ 14:34    Patient is a 87y old  Female who presents with a chief complaint of SOB and cough (13 Nov 2022 14:21)      Any change in ROS: She is coughing more: and wheezing+      MEDICATIONS  (STANDING):  albuterol    90 MICROgram(s) HFA Inhaler 2 Puff(s) Inhalation every 6 hours  albuterol/ipratropium for Nebulization 3 milliLiter(s) Nebulizer every 6 hours  amLODIPine   Tablet 2.5 milliGRAM(s) Oral daily  atorvastatin 20 milliGRAM(s) Oral at bedtime  budesonide 160 MICROgram(s)/formoterol 4.5 MICROgram(s) Inhaler 2 Puff(s) Inhalation two times a day  cefTRIAXone   IVPB 1000 milliGRAM(s) IV Intermittent every 24 hours  enoxaparin Injectable 40 milliGRAM(s) SubCutaneous every 24 hours  influenza  Vaccine (HIGH DOSE) 0.7 milliLiter(s) IntraMuscular once  levothyroxine 88 MICROGram(s) Oral daily  methylPREDNISolone sodium succinate Injectable 40 milliGRAM(s) IV Push every 8 hours  metoprolol succinate ER 25 milliGRAM(s) Oral daily  montelukast 10 milliGRAM(s) Oral daily  pantoprazole    Tablet 40 milliGRAM(s) Oral before breakfast  polyethylene glycol 3350 17 Gram(s) Oral daily  tiotropium 18 MICROgram(s) Capsule 1 Capsule(s) Inhalation daily    MEDICATIONS  (PRN):  acetaminophen     Tablet .. 650 milliGRAM(s) Oral every 6 hours PRN Temp greater or equal to 38C (100.4F), Mild Pain (1 - 3)  aluminum hydroxide/magnesium hydroxide/simethicone Suspension 30 milliLiter(s) Oral every 4 hours PRN Dyspepsia  artificial  tears Solution 1 Drop(s) Both EYES three times a day PRN Dry Eyes  benzonatate 200 milliGRAM(s) Oral three times a day PRN Cough  guaiFENesin Oral Liquid (Sugar-Free) 200 milliGRAM(s) Oral every 6 hours PRN Cough    Vital Signs Last 24 Hrs  T(C): 36.6 (14 Nov 2022 11:10), Max: 36.8 (13 Nov 2022 21:25)  T(F): 97.9 (14 Nov 2022 11:10), Max: 98.2 (13 Nov 2022 21:25)  HR: 69 (14 Nov 2022 11:10) (69 - 96)  BP: 119/54 (14 Nov 2022 11:10) (119/54 - 145/86)  BP(mean): --  RR: 18 (14 Nov 2022 11:10) (18 - 18)  SpO2: 95% (14 Nov 2022 11:10) (95% - 98%)    Parameters below as of 14 Nov 2022 11:10  Patient On (Oxygen Delivery Method): room air        I&O's Summary        Physical Exam:   GENERAL: NAD, well-groomed, well-developed  HEENT: LEW/   Atraumatic, Normocephalic  ENMT: No tonsillar erythema, exudates, or enlargement; Moist mucous membranes, Good dentition, No lesions  NECK: Supple, No JVD, Normal thyroid  CHEST/LUNG: wheezing+  CVS: Regular rate and rhythm; No murmurs, rubs, or gallops  GI: : Soft, Nontender, Nondistended; Bowel sounds present  NERVOUS SYSTEM:  Alert & Oriented X3  EXTREMITIES:  - edema  LYMPH: No lymphadenopathy noted  SKIN: No rashes or lesions  ENDOCRINOLOGY: No Thyromegaly  PSYCH: Appropriate    Labs:  33                            13.2   28.23 )-----------( 345      ( 14 Nov 2022 06:04 )             42.9                         14.4   20.86 )-----------( 340      ( 13 Nov 2022 07:25 )             45.7                         14.1   16.66 )-----------( 328      ( 12 Nov 2022 07:28 )             46.2                         14.4   17.94 )-----------( 324      ( 11 Nov 2022 20:30 )             46.6     11-14    136  |  99  |  23  ----------------------------<  155<H>  4.0   |  26  |  0.77  11-13    135  |  97<L>  |  15  ----------------------------<  237<H>  5.0   |  30  |  0.77  11-12    135  |  96<L>  |  15  ----------------------------<  89  4.1   |  31  |  0.84  11-11    137  |  97<L>  |  13  ----------------------------<  112<H>  4.4   |  27  |  0.68    Ca    8.5      14 Nov 2022 06:04  Ca    9.4      13 Nov 2022 07:25  Phos  3.7     11-14  Phos  3.3     11-13  Mg     2.40     11-14  Mg     2.50     11-13    TPro  7.3  /  Alb  3.6  /  TBili  0.3  /  DBili  x   /  AST  12  /  ALT  18  /  AlkPhos  84  11-13  TPro  7.1  /  Alb  3.4  /  TBili  0.4  /  DBili  x   /  AST  20  /  ALT  26  /  AlkPhos  81  11-11    CAPILLARY BLOOD GLUCOSE          LIVER FUNCTIONS - ( 13 Nov 2022 07:25 )  Alb: 3.6 g/dL / Pro: 7.3 g/dL / ALK PHOS: 84 U/L / ALT: 18 U/L / AST: 12 U/L / GGT: x               Serum Pro-Brain Natriuretic Peptide: 82 pg/mL (11-11 @ 23:41)  Serum Pro-Brain Natriuretic Peptide: 112 pg/mL (11-11 @ 20:30)        RECENT CULTURES:  11-11 @ 23:50 Clean Catch Clean Catch (Midstream)                <10,000 CFU/mL Normal Urogenital Barbara    11-11 @ 20:55 .Blood Blood-Peripheral                No growth to date.    11-11 @ 20:30 .Blood Blood-Peripheral         rad< from: CT Chest w/ IV Cont (11.11.22 @ 23:15) >  n normal limits.  REPRODUCTIVE ORGANS: Uterus and adnexa within normal limits.    BOWEL: No bowel obstruction. Portions of the colon are degraded by motion   artifact. Appendix is normal.  PERITONEUM: No ascites.  VESSELS: Atherosclerotic changes.  RETROPERITONEUM/LYMPH NODES: Nolymphadenopathy.  ABDOMINAL WALL: Small fat-containing right inguinal hernia.  BONES: Degenerative changes.    IMPRESSION: Motion degraded exam.  Clustered nodular opacities in the right upper lobe and lingula may be   infectious/inflammatory in nature.    Trace right pleural effusion.    No acute abdominal pathology.    --- End of Report ---          DIYA TREVIZO MD; Resident Radiologist  This document has been electronically signed.  ISABEL MAGANA MD; Attending Radiologist  This document has been electronically signed. Nov 12 2022 12:51AM    < end of copied text >         No growth to date.          RESPIRATORY CULTURES:          Studies  Chest X-RAY  CT SCAN Chest   Venous Dopplers: LE:   CT Abdomen  Others

## 2022-11-14 NOTE — PROGRESS NOTE ADULT - SUBJECTIVE AND OBJECTIVE BOX
[place]  87y  Female      Patient is a 87y old  Female who presents with a chief complaint of SOB and cough (13 Nov 2022 14:21)      REVIEW OF SYSTEMS:  CONSTITUTIONAL: No fever  RESPIRATORY: No cough, hemoptysis or shortness of breath  CARDIOVASCULAR: No chest pain, palpitations, dizziness, or leg swelling  GASTROINTESTINAL: No abdominal pain. nausea, vomiting, hematemesis  GENITOURINARY: No dysuria, frequency, hematuria   NEUROLOGICAL: No headaches, no dizziness  MUSCULOSKELETAL: No joint pain or swelling;     INTERVAL HPI/OVERNIGHT EVENTS:  T(C): 36.6 (11-14-22 @ 11:10), Max: 36.8 (11-13-22 @ 21:25)  HR: 69 (11-14-22 @ 11:10) (69 - 96)  BP: 119/54 (11-14-22 @ 11:10) (119/54 - 145/86)  RR: 18 (11-14-22 @ 11:10) (18 - 18)  SpO2: 95% (11-14-22 @ 11:10) (95% - 98%)  Wt(kg): --  I&O's Summary    T(C): 36.6 (11-14-22 @ 11:10), Max: 36.8 (11-13-22 @ 21:25)  HR: 69 (11-14-22 @ 11:10) (69 - 96)  BP: 119/54 (11-14-22 @ 11:10) (119/54 - 145/86)  RR: 18 (11-14-22 @ 11:10) (18 - 18)  SpO2: 95% (11-14-22 @ 11:10) (95% - 98%)  Wt(kg): --Vital Signs Last 24 Hrs  T(C): 36.6 (14 Nov 2022 11:10), Max: 36.8 (13 Nov 2022 21:25)  T(F): 97.9 (14 Nov 2022 11:10), Max: 98.2 (13 Nov 2022 21:25)  HR: 69 (14 Nov 2022 11:10) (69 - 96)  BP: 119/54 (14 Nov 2022 11:10) (119/54 - 145/86)  BP(mean): --  RR: 18 (14 Nov 2022 11:10) (18 - 18)  SpO2: 95% (14 Nov 2022 11:10) (95% - 98%)    Parameters below as of 14 Nov 2022 11:10  Patient On (Oxygen Delivery Method): room air        LABS:                        13.2   28.23 )-----------( 345      ( 14 Nov 2022 06:04 )             42.9     11-14    136  |  99  |  23  ----------------------------<  155<H>  4.0   |  26  |  0.77    Ca    8.5      14 Nov 2022 06:04  Phos  3.7     11-14  Mg     2.40     11-14    TPro  7.3  /  Alb  3.6  /  TBili  0.3  /  DBili  x   /  AST  12  /  ALT  18  /  AlkPhos  84  11-13        CAPILLARY BLOOD GLUCOSE                PAST MEDICAL & SURGICAL HISTORY:  Cardiac arrest      HTN (hypertension)      COPD exacerbation      CAD (coronary artery disease)      Hypothyroid          MEDICATIONS  (STANDING):  albuterol    90 MICROgram(s) HFA Inhaler 2 Puff(s) Inhalation every 6 hours  amLODIPine   Tablet 2.5 milliGRAM(s) Oral daily  atorvastatin 20 milliGRAM(s) Oral at bedtime  benzonatate 100 milliGRAM(s) Oral every 8 hours  budesonide 160 MICROgram(s)/formoterol 4.5 MICROgram(s) Inhaler 2 Puff(s) Inhalation two times a day  cefTRIAXone   IVPB 1000 milliGRAM(s) IV Intermittent every 24 hours  enoxaparin Injectable 40 milliGRAM(s) SubCutaneous every 24 hours  influenza  Vaccine (HIGH DOSE) 0.7 milliLiter(s) IntraMuscular once  levothyroxine 88 MICROGram(s) Oral daily  methylPREDNISolone sodium succinate Injectable 40 milliGRAM(s) IV Push every 8 hours  metoprolol succinate ER 25 milliGRAM(s) Oral daily  montelukast 10 milliGRAM(s) Oral daily  pantoprazole    Tablet 40 milliGRAM(s) Oral before breakfast  polyethylene glycol 3350 17 Gram(s) Oral daily  tiotropium 18 MICROgram(s) Capsule 1 Capsule(s) Inhalation daily    MEDICATIONS  (PRN):  acetaminophen     Tablet .. 650 milliGRAM(s) Oral every 6 hours PRN Temp greater or equal to 38C (100.4F), Mild Pain (1 - 3)  aluminum hydroxide/magnesium hydroxide/simethicone Suspension 30 milliLiter(s) Oral every 4 hours PRN Dyspepsia  artificial  tears Solution 1 Drop(s) Both EYES three times a day PRN Dry Eyes  guaiFENesin Oral Liquid (Sugar-Free) 200 milliGRAM(s) Oral every 6 hours PRN Cough        RADIOLOGY & ADDITIONAL TESTS:    Imaging Personally Reviewed:  [ ] YES  [ ] NO    Consultant(s) Notes Reviewed:  [ ] YES  [ ] NO    PHYSICAL EXAM:  GENERAL: Alert and awake lying in bed in no distress  HEAD:  Atraumatic, Normocephalic  EYES: EOMI, LEW, conjunctiva and sclera clear  NECK: Supple, No JVD, Normal thyroid  NERVOUS SYSTEM:  Alert & Oriented X3, Motor and sensory systems are intact,   CHEST/LUNG: Bilateral clear breath sounds, no rhochi, no wheezing, no crepitations,  HEART: Regular rate and rhythm; No murmurs, rubs, or gallops  ABDOMEN: Soft, Nontender, Nondistended; Bowel sounds present  EXTREMITIES:   Peripheral Pulses are palpable, no  edema        Care Discussed with Consultants/Other Providers [ ] YES  [ ] NO      Code Status: [] Full Code [] DNR [] DNI [] Goals of Care:   Disposition: [] ICU [] Stroke Unit [] RCU []PCU []Floor [] Discharge Home         TAURUS CamposP     [place]  87y  Female      Patient is a 87y old  Female who presents with a chief complaint of SOB and cough (13 Nov 2022 14:21)  Patient was seen and examined.chart reviewed.Reported loose BMX3,c/o palpitation at times.no sob,no cp,no cough    REVIEW OF SYSTEMS:  CONSTITUTIONAL: No fever  no vomiting  INTERVAL HPI/OVERNIGHT EVENTS:  T(C): 36.6 (11-14-22 @ 11:10), Max: 36.8 (11-13-22 @ 21:25)  HR: 69 (11-14-22 @ 11:10) (69 - 96)  BP: 119/54 (11-14-22 @ 11:10) (119/54 - 145/86)  RR: 18 (11-14-22 @ 11:10) (18 - 18)  SpO2: 95% (11-14-22 @ 11:10) (95% - 98%)  Wt(kg): --  I&O's Summary    T(C): 36.6 (11-14-22 @ 11:10), Max: 36.8 (11-13-22 @ 21:25)  HR: 69 (11-14-22 @ 11:10) (69 - 96)  BP: 119/54 (11-14-22 @ 11:10) (119/54 - 145/86)  RR: 18 (11-14-22 @ 11:10) (18 - 18)  SpO2: 95% (11-14-22 @ 11:10) (95% - 98%)  Wt(kg): --Vital Signs Last 24 Hrs  T(C): 36.6 (14 Nov 2022 11:10), Max: 36.8 (13 Nov 2022 21:25)  T(F): 97.9 (14 Nov 2022 11:10), Max: 98.2 (13 Nov 2022 21:25)  HR: 69 (14 Nov 2022 11:10) (69 - 96)  BP: 119/54 (14 Nov 2022 11:10) (119/54 - 145/86)  BP(mean): --  RR: 18 (14 Nov 2022 11:10) (18 - 18)  SpO2: 95% (14 Nov 2022 11:10) (95% - 98%)    Parameters below as of 14 Nov 2022 11:10  Patient On (Oxygen Delivery Method): room air        LABS:                        13.2   28.23 )-----------( 345      ( 14 Nov 2022 06:04 )             42.9     11-14    136  |  99  |  23  ----------------------------<  155<H>  4.0   |  26  |  0.77    Ca    8.5      14 Nov 2022 06:04  Phos  3.7     11-14  Mg     2.40     11-14    TPro  7.3  /  Alb  3.6  /  TBili  0.3  /  DBili  x   /  AST  12  /  ALT  18  /  AlkPhos  84  11-13        CAPILLARY BLOOD GLUCOSE                PAST MEDICAL & SURGICAL HISTORY:  Cardiac arrest      HTN (hypertension)      COPD exacerbation      CAD (coronary artery disease)      Hypothyroid          MEDICATIONS  (STANDING):  albuterol    90 MICROgram(s) HFA Inhaler 2 Puff(s) Inhalation every 6 hours  amLODIPine   Tablet 2.5 milliGRAM(s) Oral daily  atorvastatin 20 milliGRAM(s) Oral at bedtime  benzonatate 100 milliGRAM(s) Oral every 8 hours  budesonide 160 MICROgram(s)/formoterol 4.5 MICROgram(s) Inhaler 2 Puff(s) Inhalation two times a day  cefTRIAXone   IVPB 1000 milliGRAM(s) IV Intermittent every 24 hours  enoxaparin Injectable 40 milliGRAM(s) SubCutaneous every 24 hours  influenza  Vaccine (HIGH DOSE) 0.7 milliLiter(s) IntraMuscular once  levothyroxine 88 MICROGram(s) Oral daily  methylPREDNISolone sodium succinate Injectable 40 milliGRAM(s) IV Push every 8 hours  metoprolol succinate ER 25 milliGRAM(s) Oral daily  montelukast 10 milliGRAM(s) Oral daily  pantoprazole    Tablet 40 milliGRAM(s) Oral before breakfast  polyethylene glycol 3350 17 Gram(s) Oral daily  tiotropium 18 MICROgram(s) Capsule 1 Capsule(s) Inhalation daily    MEDICATIONS  (PRN):  acetaminophen     Tablet .. 650 milliGRAM(s) Oral every 6 hours PRN Temp greater or equal to 38C (100.4F), Mild Pain (1 - 3)  aluminum hydroxide/magnesium hydroxide/simethicone Suspension 30 milliLiter(s) Oral every 4 hours PRN Dyspepsia  artificial  tears Solution 1 Drop(s) Both EYES three times a day PRN Dry Eyes  guaiFENesin Oral Liquid (Sugar-Free) 200 milliGRAM(s) Oral every 6 hours PRN Cough        RADIOLOGY & ADDITIONAL TESTS:    Imaging Personally Reviewed:  [ ] YES  [ ] NO    Consultant(s) Notes Reviewed:  x[ ] YES  [ ] NO    PHYSICAL EXAM:  GENERAL: Alert and awake lying in bed in no distress  HEAD:  Atraumatic, Normocephalic  EYES: EOMI, LEW, conjunctiva and sclera clear  NECK: Supple, No JVD, Normal thyroid  NERVOUS SYSTEM:  Alert & Oriented X3, Motor and sensory systems are intact,   CHEST/LUNG: Bilateral clear breath sounds, no rhochi, no wheezing, no crepitations,  HEART: Regular rate and rhythm; No murmurs, rubs, or gallops  ABDOMEN: Soft, Nontender, Nondistended; Bowel sounds present  EXTREMITIES:   Peripheral Pulses are palpable, no  edema        Care Discussed with Consultants/Other Providers [x ] YES  [ ] NO      Code Status: [] Full Code [] DNR [] DNI [] Goals of Care:   Disposition: [] ICU [] Stroke Unit [] RCU []PCU []Floor [] Discharge Home         [facility]posWhit[facility] and [facility] CenterP

## 2022-11-14 NOTE — CHART NOTE - NSCHARTNOTEFT_GEN_A_CORE
RN notified covering provider pt c/o diarrhea       Chart reviewed, pt assessed at bedside, NAD, AOx4. Pt reports 3 episodes of diarrhea for the past day. Patient denies abdominal pain, nausea, vomiting, presence of blood in stool, and melena. Pt reports palpitations denies shortness of breath and chest pain. No events on telemetry, Hr 80s NSR.       Vital Signs Last 24 Hrs  T(C): 36.6 (14 Nov 2022 11:10), Max: 36.8 (13 Nov 2022 21:25)  T(F): 97.9 (14 Nov 2022 11:10), Max: 98.2 (13 Nov 2022 21:25)  HR: 69 (14 Nov 2022 11:10) (69 - 96)  BP: 119/54 (14 Nov 2022 11:10) (119/54 - 145/86)  RR: 18 (14 Nov 2022 11:10) (18 - 18)  SpO2: 95% (14 Nov 2022 11:10) (95% - 98%)      Physical Exam  Gen: NAD, AOx4  Heart: S1 S2 no MRC  Lungs: CTABL no wheezing rales or rhonchi  Abdomen: soft, nontender, nondistended, normal active bowel sounds   Extremities: +skin turgor radial pulses +2 bilaterally warm acyanotic         PLAN:   -Normal saline 500cc @ 50cc/Hr x1  -d/c Miralax   -Will continue to monitor     Carlin Jovel PA-C  Department of Internal Medicine  In-House beeper number [id number]    -Discussed plan with patient's daughter Shalini rBavo (Phone #: [phone]) who is agreement with the plan. RN notified covering provider pt c/o diarrhea       Chart reviewed, pt assessed at bedside, NAD, AOx4. Pt reports 3 episodes of diarrhea for the past day. Patient denies abdominal pain, nausea, vomiting, presence of blood in stool, and melena. Pt reports palpitations denies shortness of breath and chest pain. No events on telemetry, Hr 80s NSR.       Vital Signs Last 24 Hrs  T(C): 36.6 (14 Nov 2022 11:10), Max: 36.8 (13 Nov 2022 21:25)  T(F): 97.9 (14 Nov 2022 11:10), Max: 98.2 (13 Nov 2022 21:25)  HR: 69 (14 Nov 2022 11:10) (69 - 96)  BP: 119/54 (14 Nov 2022 11:10) (119/54 - 145/86)  RR: 18 (14 Nov 2022 11:10) (18 - 18)  SpO2: 95% (14 Nov 2022 11:10) (95% - 98%)      Physical Exam  Gen: NAD, AOx4  Heart: S1 S2 no MRC  Lungs: CTABL no wheezing rales or rhonchi  Abdomen: soft, nontender, nondistended, normal active bowel sounds   Extremities: +skin turgor, radial pulses +2 bilaterally, warm, acyanotic         PLAN:   -Normal saline 500cc @ 50cc/Hr x1  -d/c Miralax   -Will continue to monitor     Carlin Jovel PA-C  Department of Internal Medicine  In-House beeper number [phone]    -Discussed plan with patient's daughter Shalini Bravo (Phone #: [phone]) who is agreement with the plan.

## 2022-11-15 LAB
ANION GAP SERPL CALC-SCNC: 12 MMOL/L — SIGNIFICANT CHANGE UP (ref 7–14)
BUN SERPL-MCNC: 18 MG/DL — SIGNIFICANT CHANGE UP (ref 7–23)
CALCIUM SERPL-MCNC: 8.5 MG/DL — SIGNIFICANT CHANGE UP (ref 8.4–10.5)
CHLORIDE SERPL-SCNC: 99 MMOL/L — SIGNIFICANT CHANGE UP (ref 98–107)
CK MB BLD-MCNC: 14.7 % — HIGH (ref 0–2.5)
CK MB CFR SERPL CALC: 2.5 NG/ML — SIGNIFICANT CHANGE UP
CO2 SERPL-SCNC: 25 MMOL/L — SIGNIFICANT CHANGE UP (ref 22–31)
CREAT SERPL-MCNC: 0.65 MG/DL — SIGNIFICANT CHANGE UP (ref 0.5–1.3)
EGFR: 86 ML/MIN/1.73M2 — SIGNIFICANT CHANGE UP
GLUCOSE SERPL-MCNC: 185 MG/DL — HIGH (ref 70–99)
HCT VFR BLD CALC: 40.2 % — SIGNIFICANT CHANGE UP (ref 34.5–45)
HGB BLD-MCNC: 12.7 G/DL — SIGNIFICANT CHANGE UP (ref 11.5–15.5)
MAGNESIUM SERPL-MCNC: 2.3 MG/DL — SIGNIFICANT CHANGE UP (ref 1.6–2.6)
MCHC RBC-ENTMCNC: 27.5 PG — SIGNIFICANT CHANGE UP (ref 27–34)
MCHC RBC-ENTMCNC: 31.6 GM/DL — LOW (ref 32–36)
MCV RBC AUTO: 87 FL — SIGNIFICANT CHANGE UP (ref 80–100)
NRBC # BLD: 0 /100 WBCS — SIGNIFICANT CHANGE UP (ref 0–0)
NRBC # FLD: 0 K/UL — SIGNIFICANT CHANGE UP (ref 0–0)
PHOSPHATE SERPL-MCNC: 3.7 MG/DL — SIGNIFICANT CHANGE UP (ref 2.5–4.5)
PLATELET # BLD AUTO: 332 K/UL — SIGNIFICANT CHANGE UP (ref 150–400)
POTASSIUM SERPL-MCNC: 4.4 MMOL/L — SIGNIFICANT CHANGE UP (ref 3.5–5.3)
POTASSIUM SERPL-SCNC: 4.4 MMOL/L — SIGNIFICANT CHANGE UP (ref 3.5–5.3)
RBC # BLD: 4.62 M/UL — SIGNIFICANT CHANGE UP (ref 3.8–5.2)
RBC # FLD: 14.5 % — SIGNIFICANT CHANGE UP (ref 10.3–14.5)
SODIUM SERPL-SCNC: 136 MMOL/L — SIGNIFICANT CHANGE UP (ref 135–145)
TROPONIN T, HIGH SENSITIVITY RESULT: 23 NG/L — SIGNIFICANT CHANGE UP
WBC # BLD: 18.58 K/UL — HIGH (ref 3.8–10.5)
WBC # FLD AUTO: 18.58 K/UL — HIGH (ref 3.8–10.5)

## 2022-11-15 RX ORDER — IPRATROPIUM/ALBUTEROL SULFATE 18-103MCG
3 AEROSOL WITH ADAPTER (GRAM) INHALATION EVERY 6 HOURS
Refills: 0 | Status: DISCONTINUED | OUTPATIENT
Start: 2022-11-15 | End: 2022-11-17

## 2022-11-15 RX ORDER — DEXTROSE 50 % IN WATER 50 %
12.5 SYRINGE (ML) INTRAVENOUS ONCE
Refills: 0 | Status: DISCONTINUED | OUTPATIENT
Start: 2022-11-15 | End: 2022-11-19

## 2022-11-15 RX ORDER — DEXTROSE 50 % IN WATER 50 %
15 SYRINGE (ML) INTRAVENOUS ONCE
Refills: 0 | Status: DISCONTINUED | OUTPATIENT
Start: 2022-11-15 | End: 2022-11-19

## 2022-11-15 RX ORDER — INSULIN LISPRO 100/ML
VIAL (ML) SUBCUTANEOUS
Refills: 0 | Status: DISCONTINUED | OUTPATIENT
Start: 2022-11-15 | End: 2022-11-19

## 2022-11-15 RX ORDER — SODIUM CHLORIDE 9 MG/ML
1000 INJECTION, SOLUTION INTRAVENOUS
Refills: 0 | Status: DISCONTINUED | OUTPATIENT
Start: 2022-11-15 | End: 2022-11-19

## 2022-11-15 RX ORDER — DEXTROSE 50 % IN WATER 50 %
25 SYRINGE (ML) INTRAVENOUS ONCE
Refills: 0 | Status: DISCONTINUED | OUTPATIENT
Start: 2022-11-15 | End: 2022-11-19

## 2022-11-15 RX ORDER — INSULIN LISPRO 100/ML
VIAL (ML) SUBCUTANEOUS AT BEDTIME
Refills: 0 | Status: DISCONTINUED | OUTPATIENT
Start: 2022-11-15 | End: 2022-11-19

## 2022-11-15 RX ORDER — LACTOBACILLUS ACIDOPHILUS 100MM CELL
1 CAPSULE ORAL AT BEDTIME
Refills: 0 | Status: DISCONTINUED | OUTPATIENT
Start: 2022-11-15 | End: 2022-11-19

## 2022-11-15 RX ORDER — GLUCAGON INJECTION, SOLUTION 0.5 MG/.1ML
1 INJECTION, SOLUTION SUBCUTANEOUS ONCE
Refills: 0 | Status: DISCONTINUED | OUTPATIENT
Start: 2022-11-15 | End: 2022-11-19

## 2022-11-15 RX ADMIN — Medication 100 MILLIGRAM(S): at 23:15

## 2022-11-15 RX ADMIN — ATORVASTATIN CALCIUM 20 MILLIGRAM(S): 80 TABLET, FILM COATED ORAL at 23:15

## 2022-11-15 RX ADMIN — Medication 100 MILLIGRAM(S): at 05:45

## 2022-11-15 RX ADMIN — MONTELUKAST 10 MILLIGRAM(S): 4 TABLET, CHEWABLE ORAL at 14:36

## 2022-11-15 RX ADMIN — BUDESONIDE AND FORMOTEROL FUMARATE DIHYDRATE 2 PUFF(S): 160; 4.5 AEROSOL RESPIRATORY (INHALATION) at 21:35

## 2022-11-15 RX ADMIN — Medication 88 MICROGRAM(S): at 05:45

## 2022-11-15 RX ADMIN — TIOTROPIUM BROMIDE 1 CAPSULE(S): 18 CAPSULE ORAL; RESPIRATORY (INHALATION) at 09:56

## 2022-11-15 RX ADMIN — Medication 1 TABLET(S): at 23:23

## 2022-11-15 RX ADMIN — Medication 3 MILLILITER(S): at 18:01

## 2022-11-15 RX ADMIN — BUDESONIDE AND FORMOTEROL FUMARATE DIHYDRATE 2 PUFF(S): 160; 4.5 AEROSOL RESPIRATORY (INHALATION) at 09:55

## 2022-11-15 RX ADMIN — SODIUM CHLORIDE 50 MILLILITER(S): 9 INJECTION INTRAMUSCULAR; INTRAVENOUS; SUBCUTANEOUS at 09:00

## 2022-11-15 RX ADMIN — Medication 1: at 13:02

## 2022-11-15 RX ADMIN — ALBUTEROL 2 PUFF(S): 90 AEROSOL, METERED ORAL at 05:44

## 2022-11-15 RX ADMIN — Medication 200 MILLIGRAM(S): at 18:54

## 2022-11-15 RX ADMIN — Medication 40 MILLIGRAM(S): at 05:45

## 2022-11-15 RX ADMIN — ENOXAPARIN SODIUM 40 MILLIGRAM(S): 100 INJECTION SUBCUTANEOUS at 05:44

## 2022-11-15 RX ADMIN — AMLODIPINE BESYLATE 2.5 MILLIGRAM(S): 2.5 TABLET ORAL at 05:45

## 2022-11-15 RX ADMIN — SODIUM CHLORIDE 50 MILLILITER(S): 9 INJECTION INTRAMUSCULAR; INTRAVENOUS; SUBCUTANEOUS at 23:24

## 2022-11-15 RX ADMIN — Medication 40 MILLIGRAM(S): at 23:15

## 2022-11-15 RX ADMIN — CEFTRIAXONE 100 MILLIGRAM(S): 500 INJECTION, POWDER, FOR SOLUTION INTRAMUSCULAR; INTRAVENOUS at 14:36

## 2022-11-15 RX ADMIN — PANTOPRAZOLE SODIUM 40 MILLIGRAM(S): 20 TABLET, DELAYED RELEASE ORAL at 05:45

## 2022-11-15 RX ADMIN — Medication 25 MILLIGRAM(S): at 05:45

## 2022-11-15 RX ADMIN — ALBUTEROL 2 PUFF(S): 90 AEROSOL, METERED ORAL at 09:56

## 2022-11-15 RX ADMIN — Medication 100 MILLIGRAM(S): at 18:55

## 2022-11-15 RX ADMIN — Medication 1 DROP(S): at 05:49

## 2022-11-15 RX ADMIN — Medication 40 MILLIGRAM(S): at 14:37

## 2022-11-15 NOTE — PROGRESS NOTE ADULT - SUBJECTIVE AND OBJECTIVE BOX
S/  Pt still SOB and coughing  Gen weakness  Pleuritic chest pain    ROS: diarrhea yesterday    O/  MEDICATIONS  (STANDING):  albuterol/ipratropium for Nebulization 3 milliLiter(s) Nebulizer every 6 hours  amLODIPine   Tablet 2.5 milliGRAM(s) Oral daily  atorvastatin 20 milliGRAM(s) Oral at bedtime  benzonatate 100 milliGRAM(s) Oral every 8 hours  budesonide 160 MICROgram(s)/formoterol 4.5 MICROgram(s) Inhaler 2 Puff(s) Inhalation two times a day  cefTRIAXone   IVPB 1000 milliGRAM(s) IV Intermittent every 24 hours  dextrose 5%. 1000 milliLiter(s) (50 mL/Hr) IV Continuous <Continuous>  dextrose 5%. 1000 milliLiter(s) (100 mL/Hr) IV Continuous <Continuous>  dextrose 50% Injectable 25 Gram(s) IV Push once  dextrose 50% Injectable 12.5 Gram(s) IV Push once  dextrose 50% Injectable 25 Gram(s) IV Push once  enoxaparin Injectable 40 milliGRAM(s) SubCutaneous every 24 hours  glucagon  Injectable 1 milliGRAM(s) IntraMuscular once  influenza  Vaccine (HIGH DOSE) 0.7 milliLiter(s) IntraMuscular once  insulin lispro (ADMELOG) corrective regimen sliding scale   SubCutaneous three times a day before meals  insulin lispro (ADMELOG) corrective regimen sliding scale   SubCutaneous at bedtime  levothyroxine 88 MICROGram(s) Oral daily  methylPREDNISolone sodium succinate Injectable 40 milliGRAM(s) IV Push every 8 hours  metoprolol succinate ER 25 milliGRAM(s) Oral daily  montelukast 10 milliGRAM(s) Oral daily  pantoprazole    Tablet 40 milliGRAM(s) Oral before breakfast  sodium chloride 0.9%. 500 milliLiter(s) (50 mL/Hr) IV Continuous <Continuous>  tiotropium 18 MICROgram(s) Capsule 1 Capsule(s) Inhalation daily    MEDICATIONS  (PRN):  acetaminophen     Tablet .. 650 milliGRAM(s) Oral every 6 hours PRN Temp greater or equal to 38C (100.4F), Mild Pain (1 - 3)  aluminum hydroxide/magnesium hydroxide/simethicone Suspension 30 milliLiter(s) Oral every 4 hours PRN Dyspepsia  artificial  tears Solution 1 Drop(s) Both EYES three times a day PRN Dry Eyes  dextrose Oral Gel 15 Gram(s) Oral once PRN Blood Glucose LESS THAN 70 milliGRAM(s)/deciliter  guaiFENesin Oral Liquid (Sugar-Free) 200 milliGRAM(s) Oral every 6 hours PRN Cough    Vital Signs Last 24 Hrs  T(C): 36.7 (15 Nov 2022 11:40), Max: 36.8 (15 Nov 2022 05:40)  T(F): 98 (15 Nov 2022 11:40), Max: 98.2 (15 Nov 2022 05:40)  HR: 73 (15 Nov 2022 11:40) (73 - 87)  BP: 107/81 (15 Nov 2022 11:40) (107/81 - 149/83)  BP(mean): --  RR: 17 (15 Nov 2022 11:40) (17 - 18)  SpO2: 99% (15 Nov 2022 11:40) (96% - 99%)    Parameters below as of 15 Nov 2022 11:40  Patient On (Oxygen Delivery Method): room air        I&O's Summary        Physical Exam:   GENERAL: NAD, well-groomed, well-developed  HEENT: LEW/   Atraumatic, Normocephalic  ENMT: No tonsillar erythema, exudates, or enlargement; Moist mucous membranes, Good dentition, No lesions  NECK: Supple, No JVD, Normal thyroid  CHEST/LUNG: wheezing+and rhonchi; no rales  CVS: s1s2 Regular rate and rhythm; No murmurs, rubs, or gallops  GI: : Soft, mild epigastric tender, Nondistended; Bowel sounds present  NERVOUS SYSTEM:  Alert & Oriented X3  EXTREMITIES:  - edema  LYMPH: No lymphadenopathy noted  SKIN: No rashes or lesions  ENDOCRINOLOGY: No Thyromegaly  PSYCH: Appropriate    Labs:  33  CARDIAC MARKERS ( 14 Nov 2022 23:16 )  x     / x     / 17 U/L / x     / 2.5 ng/mL                            12.7   18.58 )-----------( 332      ( 15 Nov 2022 06:20 )             40.2                         13.0   21.85 )-----------( 358      ( 14 Nov 2022 23:16 )             41.1                         13.2   28.23 )-----------( 345      ( 14 Nov 2022 06:04 )             42.9                         14.4   20.86 )-----------( 340      ( 13 Nov 2022 07:25 )             45.7                         14.1   16.66 )-----------( 328      ( 12 Nov 2022 07:28 )             46.2                         14.4   17.94 )-----------( 324      ( 11 Nov 2022 20:30 )             46.6     11-15    136  |  99  |  18  ----------------------------<  185<H>  4.4   |  25  |  0.65  11-14    133<L>  |  96<L>  |  20  ----------------------------<  215<H>  4.2   |  25  |  0.71  11-14    136  |  99  |  23  ----------------------------<  155<H>  4.0   |  26  |  0.77  11-13    135  |  97<L>  |  15  ----------------------------<  237<H>  5.0   |  30  |  0.77  11-12    135  |  96<L>  |  15  ----------------------------<  89  4.1   |  31  |  0.84  11-11    137  |  97<L>  |  13  ----------------------------<  112<H>  4.4   |  27  |  0.68    Ca    8.5      15 Nov 2022 06:20  Ca    8.8      14 Nov 2022 23:16  Ca    8.5      14 Nov 2022 06:04  Phos  3.7     11-15  Phos  3.4     11-14  Phos  3.7     11-14  Mg     2.30     11-15  Mg     2.30     11-14  Mg     2.40     11-14    TPro  7.3  /  Alb  3.6  /  TBili  0.3  /  DBili  x   /  AST  12  /  ALT  18  /  AlkPhos  84  11-13  TPro  7.1  /  Alb  3.4  /  TBili  0.4  /  DBili  x   /  AST  20  /  ALT  26  /  AlkPhos  81  11-11

## 2022-11-15 NOTE — PROVIDER CONTACT NOTE (OTHER) - ASSESSMENT
Patient says their body feels numb and they are unable to move lower extremities. Neuro test conducted. Patient showed now deficits. Patient stated they were very tired

## 2022-11-15 NOTE — PROGRESS NOTE ADULT - SUBJECTIVE AND OBJECTIVE BOX
[place]  87y  Female      Patient is a 87y old  Female who presents with a chief complaint of SOB and cough (15 Nov 2022 18:53)  Patient feels better today,no diarrhoea today,no cp,no fever,no cough  wbc count improving    REVIEW OF SYSTEMS:  CONSTITUTIONAL: No fever  RESPIRATORY: No cough, hemoptysis or shortness of breath  CARDIOVASCULAR: No chest pain, palpitations, dizziness, or leg swelling  GASTROINTESTINAL: No abdominal pain. nausea, vomiting, hematemesis  GENITOURINARY: No dysuria, frequency, hematuria   NEUROLOGICAL: No headaches, no dizziness  MUSCULOSKELETAL: No joint pain or swelling;     INTERVAL HPI/OVERNIGHT EVENTS:  T(C): 36.7 (11-15-22 @ 11:40), Max: 36.8 (11-15-22 @ 05:40)  HR: 73 (11-15-22 @ 11:40) (73 - 87)  BP: 107/81 (11-15-22 @ 11:40) (107/81 - 149/83)  RR: 17 (11-15-22 @ 11:40) (17 - 18)  SpO2: 99% (11-15-22 @ 11:40) (97% - 99%)  Wt(kg): --  I&O's Summary    T(C): 36.7 (11-15-22 @ 11:40), Max: 36.8 (11-15-22 @ 05:40)  HR: 73 (11-15-22 @ 11:40) (73 - 87)  BP: 107/81 (11-15-22 @ 11:40) (107/81 - 149/83)  RR: 17 (11-15-22 @ 11:40) (17 - 18)  SpO2: 99% (11-15-22 @ 11:40) (97% - 99%)  Wt(kg): --Vital Signs Last 24 Hrs  T(C): 36.7 (15 Nov 2022 11:40), Max: 36.8 (15 Nov 2022 05:40)  T(F): 98 (15 Nov 2022 11:40), Max: 98.2 (15 Nov 2022 05:40)  HR: 73 (15 Nov 2022 11:40) (73 - 87)  BP: 107/81 (15 Nov 2022 11:40) (107/81 - 149/83)  BP(mean): --  RR: 17 (15 Nov 2022 11:40) (17 - 18)  SpO2: 99% (15 Nov 2022 11:40) (97% - 99%)    Parameters below as of 15 Nov 2022 11:40  Patient On (Oxygen Delivery Method): room air        LABS:                        12.7   18.58 )-----------( 332      ( 15 Nov 2022 06:20 )             40.2     11-15    136  |  99  |  18  ----------------------------<  185<H>  4.4   |  25  |  0.65    Ca    8.5      15 Nov 2022 06:20  Phos  3.7     11-15  Mg     2.30     11-15          CAPILLARY BLOOD GLUCOSE      POCT Blood Glucose.: 139 mg/dL (15 Nov 2022 17:12)  POCT Blood Glucose.: 159 mg/dL (15 Nov 2022 12:20)  POCT Blood Glucose.: 150 mg/dL (15 Nov 2022 08:27)  POCT Blood Glucose.: 206 mg/dL (14 Nov 2022 22:18)            PAST MEDICAL & SURGICAL HISTORY:  Cardiac arrest      HTN (hypertension)      COPD exacerbation      CAD (coronary artery disease)      Hypothyroid          MEDICATIONS  (STANDING):  albuterol/ipratropium for Nebulization 3 milliLiter(s) Nebulizer every 6 hours  amLODIPine   Tablet 2.5 milliGRAM(s) Oral daily  atorvastatin 20 milliGRAM(s) Oral at bedtime  benzonatate 100 milliGRAM(s) Oral every 8 hours  budesonide 160 MICROgram(s)/formoterol 4.5 MICROgram(s) Inhaler 2 Puff(s) Inhalation two times a day  cefTRIAXone   IVPB 1000 milliGRAM(s) IV Intermittent every 24 hours  dextrose 5%. 1000 milliLiter(s) (50 mL/Hr) IV Continuous <Continuous>  dextrose 5%. 1000 milliLiter(s) (100 mL/Hr) IV Continuous <Continuous>  dextrose 50% Injectable 25 Gram(s) IV Push once  dextrose 50% Injectable 12.5 Gram(s) IV Push once  dextrose 50% Injectable 25 Gram(s) IV Push once  enoxaparin Injectable 40 milliGRAM(s) SubCutaneous every 24 hours  glucagon  Injectable 1 milliGRAM(s) IntraMuscular once  guaiFENesin Oral Liquid (Sugar-Free) 200 milliGRAM(s) Oral every 6 hours  influenza  Vaccine (HIGH DOSE) 0.7 milliLiter(s) IntraMuscular once  insulin lispro (ADMELOG) corrective regimen sliding scale   SubCutaneous three times a day before meals  insulin lispro (ADMELOG) corrective regimen sliding scale   SubCutaneous at bedtime  lactobacillus acidophilus 1 Tablet(s) Oral at bedtime  levothyroxine 88 MICROGram(s) Oral daily  methylPREDNISolone sodium succinate Injectable 40 milliGRAM(s) IV Push every 8 hours  metoprolol succinate ER 25 milliGRAM(s) Oral daily  montelukast 10 milliGRAM(s) Oral daily  pantoprazole    Tablet 40 milliGRAM(s) Oral before breakfast  sodium chloride 0.9%. 500 milliLiter(s) (50 mL/Hr) IV Continuous <Continuous>  tiotropium 18 MICROgram(s) Capsule 1 Capsule(s) Inhalation daily    MEDICATIONS  (PRN):  acetaminophen     Tablet .. 650 milliGRAM(s) Oral every 6 hours PRN Temp greater or equal to 38C (100.4F), Mild Pain (1 - 3)  aluminum hydroxide/magnesium hydroxide/simethicone Suspension 30 milliLiter(s) Oral every 4 hours PRN Dyspepsia  artificial  tears Solution 1 Drop(s) Both EYES three times a day PRN Dry Eyes  dextrose Oral Gel 15 Gram(s) Oral once PRN Blood Glucose LESS THAN 70 milliGRAM(s)/deciliter  guaiFENesin Oral Liquid (Sugar-Free) 200 milliGRAM(s) Oral every 6 hours PRN Cough        RADIOLOGY & ADDITIONAL TESTS:    Imaging Personally Reviewed:  [ ] YES  [ ] NO    Consultant(s) Notes Reviewed:  [x ] YES  [ ] NO    PHYSICAL EXAM:  GENERAL: Alert and awake lying in bed in no distress  HEAD:  Atraumatic, Normocephalic  EYES: EOMI, LEW, conjunctiva and sclera clear  NECK: Supple, No JVD, Normal thyroid  NERVOUS SYSTEM:  Alert & Oriented X3, Motor and sensory systems are intact,   CHEST/LUNG: Bilateral clear breath sounds, no rhochi, no wheezing, no crepitations,  HEART: Regular rate and rhythm; No murmurs, rubs, or gallops  ABDOMEN: Soft, Nontender, Nondistended; Bowel sounds present  EXTREMITIES:   Peripheral Pulses are palpable, no  edema        Care Discussed with Consultants/Other Providers [ ] YES  [ ] NO      Code Status: [] Full Code [] DNR [] DNI [] Goals of Care:   Disposition: [] ICU [] Stroke Unit [] RCU []PCU []Floor [] Discharge Home         TAURUS CamposP

## 2022-11-15 NOTE — PROGRESS NOTE ADULT - ASSESSMENT
This is a 87-year-old female with past medical history of coronary artery disease, stents, cardiac arrest, hyperlipidemia, hypertension, diabetes, asthma, hypothyroidism, cholecystectomy, presenting to the emergency department as directed by her cardiologist for complaint of shortness of breath, chest pain, epigastric pain, and cough increasing over the course of the past 3 weeks.  Patient treated with antibiotics for presumed pneumonia 3 weeks ago without significant improvement. She was non compliant with Augmentin.  Also treated with albuterol inhaler and steroids for ongoing shortness of breath.  Today patient and daughter report lack of improvement.  In office today patient severely weak and physician with concern for pneumonia versus cardiac etiology for her symptoms, and directed to emergency department.  Patient ambulatory with cane at baseline, currently too weak and to ambulate independently.  She had a CT performed which shows RUL opacities. (12 Nov 2022 10:51)     above noed:    she used to "[employer] smoking in [place] before:  she is on Symbicort at home   never had covid infection:  and she has mild cough:

## 2022-11-15 NOTE — PROGRESS NOTE ADULT - ASSESSMENT
87-year-old female with underlying history significant for coronary artery disease s/p PCI stents LAD/RCA,, cardiac arrest, hyperlipidemia, hypertension, diabetes, chronic diastolic CHF, GERD, asthma, hypothyroidism, cholecystectomy who presented to ED via EMS    Chest pain --pleuritic type due to PNA  Sepsis due to RUL PNA  RSV infection  Acute Hypoxic Resp Failure due to RUL PNA and RSV infection  Chronic Diastolic CHF  hx CAD s/p MI PCI stents to LAD and RCA  DM, HTN, HLD  GERD    Telemetry  resume home cardiac meds  IV antibiotics per Medical / ID team  Gentle IVF  PPI     87-year-old female with underlying history significant for coronary artery disease s/p PCI stents LAD/RCA,, cardiac arrest, hyperlipidemia, hypertension, diabetes, chronic diastolic CHF, GERD, asthma, hypothyroidism, cholecystectomy who presented to ED via EMS    Chest pain --pleuritic type due to PNA  Sepsis due to RUL PNA  RSV infection  Acute Hypoxic Resp Failure due to RUL PNA and RSV infection  Chronic Diastolic CHF  hx CAD s/p MI PCI stents to LAD and RCA  DM, HTN, HLD  GERD    Telemetry  resume home cardiac meds  IV antibiotics per Medical / ID team  Gentle IVF  PPI and probiotics  ID consult d/w Medical Attending covering

## 2022-11-15 NOTE — CHART NOTE - NSCHARTNOTEFT_GEN_A_CORE
RN alerted covering provider to generalized paralysis and numbness.     #: [phone]    *History and physical exam limited 2/2 patient non-compliance*    Chart reviewed, pt assessed at bedside, NAD, AOx2. Pt reports generalized numbness family friend at bedside states pt unable to move. Pt refuses to elaborate on current complaints and was intermittently refusing to answer questions. Patient denies fevers chills. Of note, when discussing case and plan with covering RN patients family friend states patient has Hx of diabetes. No current A1c pt not currently on ISS.       Vital Signs Last 24 Hrs  T(C): 36.6 (14 Nov 2022 11:10), Max: 36.8 (14 Nov 2022 05:20)  T(F): 97.9 (14 Nov 2022 11:10), Max: 98.2 (14 Nov 2022 05:20)  HR: 69 (14 Nov 2022 11:10) (69 - 77)  BP: 119/54 (14 Nov 2022 11:10) (119/54 - 134/65)  RR: 18 (14 Nov 2022 11:10) (18 - 18)  SpO2: 95% (14 Nov 2022 11:10) (95% - 97%)    Parameters below as of 14 Nov 2022 11:10  Patient On (Oxygen Delivery Method): room air    Physical Exam  Gen: NAD, AOx4  Heart: S1 S2 no MRC  Lungs: CTABL no wheezing rales or rhonchi  Abdomen: soft, nontender, nondistended, normal active bowel sounds  Neuro: (Limited 2/2 patient non-compliance) 3/5 hand  BL | 3/5 dorsiflexion BL, sensation intact in UE and LE BL. PERRLA, no facial droop, speech not slurred as per .  Extremities: DP and radial pulses palpable, extremities warm acyanotic capillary refill <2 seconds    STAT EKG: TWI in v4-v6 no ST-segment elevations, no recent EKG for comparison.     PLAN:  -c/w IV rehydration   -f/u CBC BMP w/ Mg and iPhos, cardiac enzymes, A1c  -Will continue to monitor     -Shortly after initial assessment patient was observed walking from the bathroom to her bed       Carlin Jovel PA-C  Department of Internal Medicine  In-House beeper number [phone] RN alerted covering provider to generalized paralysis and numbness.     #: [phone]    *History and physical exam limited 2/2 patient non-compliance*    Chart reviewed, pt assessed at bedside, NAD, AOx2. Pt reports generalized numbness family friend at bedside states pt unable to move. Pt refuses to elaborate on current complaints and was intermittently refusing to answer questions. Patient denies fevers chills. Of note, when discussing case and plan with covering RN patients family friend states patient has Hx of diabetes. No current A1c pt not currently on ISS.       Vital Signs Last 24 Hrs  T(C): 36.6 (14 Nov 2022 11:10), Max: 36.8 (14 Nov 2022 05:20)  T(F): 97.9 (14 Nov 2022 11:10), Max: 98.2 (14 Nov 2022 05:20)  HR: 69 (14 Nov 2022 11:10) (69 - 77)  BP: 119/54 (14 Nov 2022 11:10) (119/54 - 134/65)  RR: 18 (14 Nov 2022 11:10) (18 - 18)  SpO2: 95% (14 Nov 2022 11:10) (95% - 97%)    Parameters below as of 14 Nov 2022 11:10  Patient On (Oxygen Delivery Method): room air    Physical Exam  Gen: NAD, AOx4  Heart: S1 S2 no MRC  Lungs: CTABL no wheezing rales or rhonchi  Abdomen: soft, nontender, nondistended, normal active bowel sounds  Neuro: (Limited 2/2 patient non-compliance) 3/5 hand  BL | 3/5 dorsiflexion BL, sensation intact in UE and LE BL. PERRLA, no facial droop, speech not slurred as per .  Extremities: DP and radial pulses palpable, extremities warm acyanotic capillary refill <2 seconds    STAT EKG: TWI in v4-v6 no ST-segment elevations, no recent EKG for comparison.     PLAN:  -c/w IV rehydration   -f/u CBC BMP w/ Mg and iPhos, cardiac enzymes, A1c  -Will continue to monitor     -Shortly after initial assessment patient was observed walking from the bathroom to her bed       Carlin Jovel PA-C  Department of Internal Medicine  In-House beeper number [phone]    -Attempted to update daughter however phone number provided not in service.

## 2022-11-15 NOTE — PROGRESS NOTE ADULT - SUBJECTIVE AND OBJECTIVE BOX
Date of Service: 11-15-22 @ 14:33    Patient is a 87y old  Female who presents with a chief complaint of SOB and cough (13 Nov 2022 14:21)      Any change in ROS: seems OK :no sob: ++ cough:       MEDICATIONS  (STANDING):  albuterol/ipratropium for Nebulization 3 milliLiter(s) Nebulizer every 6 hours  amLODIPine   Tablet 2.5 milliGRAM(s) Oral daily  atorvastatin 20 milliGRAM(s) Oral at bedtime  benzonatate 100 milliGRAM(s) Oral every 8 hours  budesonide 160 MICROgram(s)/formoterol 4.5 MICROgram(s) Inhaler 2 Puff(s) Inhalation two times a day  cefTRIAXone   IVPB 1000 milliGRAM(s) IV Intermittent every 24 hours  dextrose 5%. 1000 milliLiter(s) (50 mL/Hr) IV Continuous <Continuous>  dextrose 5%. 1000 milliLiter(s) (100 mL/Hr) IV Continuous <Continuous>  dextrose 50% Injectable 25 Gram(s) IV Push once  dextrose 50% Injectable 12.5 Gram(s) IV Push once  dextrose 50% Injectable 25 Gram(s) IV Push once  enoxaparin Injectable 40 milliGRAM(s) SubCutaneous every 24 hours  glucagon  Injectable 1 milliGRAM(s) IntraMuscular once  influenza  Vaccine (HIGH DOSE) 0.7 milliLiter(s) IntraMuscular once  insulin lispro (ADMELOG) corrective regimen sliding scale   SubCutaneous three times a day before meals  insulin lispro (ADMELOG) corrective regimen sliding scale   SubCutaneous at bedtime  levothyroxine 88 MICROGram(s) Oral daily  methylPREDNISolone sodium succinate Injectable 40 milliGRAM(s) IV Push every 8 hours  metoprolol succinate ER 25 milliGRAM(s) Oral daily  montelukast 10 milliGRAM(s) Oral daily  pantoprazole    Tablet 40 milliGRAM(s) Oral before breakfast  sodium chloride 0.9%. 500 milliLiter(s) (50 mL/Hr) IV Continuous <Continuous>  tiotropium 18 MICROgram(s) Capsule 1 Capsule(s) Inhalation daily    MEDICATIONS  (PRN):  acetaminophen     Tablet .. 650 milliGRAM(s) Oral every 6 hours PRN Temp greater or equal to 38C (100.4F), Mild Pain (1 - 3)  aluminum hydroxide/magnesium hydroxide/simethicone Suspension 30 milliLiter(s) Oral every 4 hours PRN Dyspepsia  artificial  tears Solution 1 Drop(s) Both EYES three times a day PRN Dry Eyes  dextrose Oral Gel 15 Gram(s) Oral once PRN Blood Glucose LESS THAN 70 milliGRAM(s)/deciliter  guaiFENesin Oral Liquid (Sugar-Free) 200 milliGRAM(s) Oral every 6 hours PRN Cough    Vital Signs Last 24 Hrs  T(C): 36.7 (15 Nov 2022 11:40), Max: 36.8 (15 Nov 2022 05:40)  T(F): 98 (15 Nov 2022 11:40), Max: 98.2 (15 Nov 2022 05:40)  HR: 73 (15 Nov 2022 11:40) (73 - 87)  BP: 107/81 (15 Nov 2022 11:40) (107/81 - 149/83)  BP(mean): --  RR: 17 (15 Nov 2022 11:40) (17 - 18)  SpO2: 99% (15 Nov 2022 11:40) (96% - 99%)    Parameters below as of 15 Nov 2022 11:40  Patient On (Oxygen Delivery Method): room air        I&O's Summary        Physical Exam:   GENERAL: NAD, well-groomed, well-developed  HEENT: LEW/   Atraumatic, Normocephalic  ENMT: No tonsillar erythema, exudates, or enlargement; Moist mucous membranes, Good dentition, No lesions  NECK: Supple, No JVD, Normal thyroid  CHEST/LUNG: wheezing+  CVS: Regular rate and rhythm; No murmurs, rubs, or gallops  GI: : Soft, Nontender, Nondistended; Bowel sounds present  NERVOUS SYSTEM:  Alert & Oriented X3  EXTREMITIES:  - edema  LYMPH: No lymphadenopathy noted  SKIN: No rashes or lesions  ENDOCRINOLOGY: No Thyromegaly  PSYCH: Appropriate    Labs:  33  CARDIAC MARKERS ( 14 Nov 2022 23:16 )  x     / x     / 17 U/L / x     / 2.5 ng/mL                            12.7   18.58 )-----------( 332      ( 15 Nov 2022 06:20 )             40.2                         13.0   21.85 )-----------( 358      ( 14 Nov 2022 23:16 )             41.1                         13.2   28.23 )-----------( 345      ( 14 Nov 2022 06:04 )             42.9                         14.4   20.86 )-----------( 340      ( 13 Nov 2022 07:25 )             45.7                         14.1   16.66 )-----------( 328      ( 12 Nov 2022 07:28 )             46.2                         14.4   17.94 )-----------( 324      ( 11 Nov 2022 20:30 )             46.6     11-15    136  |  99  |  18  ----------------------------<  185<H>  4.4   |  25  |  0.65  11-14    133<L>  |  96<L>  |  20  ----------------------------<  215<H>  4.2   |  25  |  0.71  11-14    136  |  99  |  23  ----------------------------<  155<H>  4.0   |  26  |  0.77  11-13    135  |  97<L>  |  15  ----------------------------<  237<H>  5.0   |  30  |  0.77  11-12    135  |  96<L>  |  15  ----------------------------<  89  4.1   |  31  |  0.84  11-11    137  |  97<L>  |  13  ----------------------------<  112<H>  4.4   |  27  |  0.68    Ca    8.5      15 Nov 2022 06:20  Ca    8.8      14 Nov 2022 23:16  Ca    8.5      14 Nov 2022 06:04  Phos  3.7     11-15  Phos  3.4     11-14  Phos  3.7     11-14  Mg     2.30     11-15  Mg     2.30     11-14  Mg     2.40     11-14    TPro  7.3  /  Alb  3.6  /  TBili  0.3  /  DBili  x   /  AST  12  /  ALT  18  /  AlkPhos  84  11-13  TPro  7.1  /  Alb  3.4  /  TBili  0.4  /  DBili  x   /  AST  20  /  ALT  26  /  AlkPhos  81  11-11    CAPILLARY BLOOD GLUCOSE      POCT Blood Glucose.: 159 mg/dL (15 Nov 2022 12:20)  POCT Blood Glucose.: 150 mg/dL (15 Nov 2022 08:27)  POCT Blood Glucose.: 206 mg/dL (14 Nov 2022 22:18)        rad< from: CT Chest w/ IV Cont (11.11.22 @ 23:15) >    BOWEL: No bowel obstruction. Portions of the colon are degraded by motion   artifact. Appendix is normal.  PERITONEUM: No ascites.  VESSELS: Atherosclerotic changes.  RETROPERITONEUM/LYMPH NODES: Nolymphadenopathy.  ABDOMINAL WALL: Small fat-containing right inguinal hernia.  BONES: Degenerative changes.    IMPRESSION: Motion degraded exam.  Clustered nodular opacities in the right upper lobe and lingula may be   infectious/inflammatory in nature.    Trace right pleural effusion.    No acute abdominal pathology.    --- End of Report ---          DIYA TREVIZO MD; Resident Radiologist  This document has been electronically signed.  ISABEL MAGANA MD; Attending Radiologist  This document has been electronically signed. Nov 12 2022 12:51AM    < end of copied text >  < from: CT Abdomen and Pelvis w/ IV Cont (11.11.22 @ 23:14) >  GALLBLADDER: Cholecystectomy.  SPLEEN: Within normal limits.  PANCREAS: Within normal limits.  ADRENALS: Within normal limits.  KIDNEYS/URETERS: No hydronephrosis. Subcentimeter hypodensities   bilaterally, too small to characterize.    BLADDER: Within normal limits.  REPRODUCTIVE ORGANS: Uterus and adnexa within normal limits.    BOWEL: No bowel obstruction. Portions of the colon are degraded by motion   artifact. Appendix is normal.  PERITONEUM: No ascites.  VESSELS: Atherosclerotic changes.  RETROPERITONEUM/LYMPH NODES: Nolymphadenopathy.  ABDOMINAL WALL: Small fat-containing right inguinal hernia.  BONES: Degenerative changes.    IMPRESSION: Motion degraded exam.  Clustered nodular opacities in the right upper lobe and lingula may be   infectious/inflammatory in nature.    Trace right pleural effusion.    No acute abdominal pathology.    --- End of Report ---          DIYA TREVIZO MD; Resident Radiologist  This document has been electronically signed.  ISABEL MAGANA MD; Attending Radiologist  This document has been electronically signed. Nov 12 2022 12:51AM    < end of copied text >            RECENT CULTURES:  11-11 @ 23:50 Clean Catch Clean Catch (Midstream)                <10,000 CFU/mL Normal Urogenital Barbara    11-11 @ 20:55 .Blood Blood-Peripheral                No growth to date.    11-11 @ 20:30 .Blood Blood-Peripheral                No growth to date.          RESPIRATORY CULTURES:          Studies  Chest X-RAY  CT SCAN Chest   Venous Dopplers: LE:   CT Abdomen  Others

## 2022-11-15 NOTE — PROVIDER CONTACT NOTE (OTHER) - ACTION/TREATMENT ORDERED:
ACP notified and came to bedside. RN give night time medications as ordered, do EKG and draw ordered labs. RN continue to monitor

## 2022-11-16 LAB
ANION GAP SERPL CALC-SCNC: 13 MMOL/L — SIGNIFICANT CHANGE UP (ref 7–14)
BASOPHILS # BLD AUTO: 0.03 K/UL — SIGNIFICANT CHANGE UP (ref 0–0.2)
BASOPHILS NFR BLD AUTO: 0.2 % — SIGNIFICANT CHANGE UP (ref 0–2)
BUN SERPL-MCNC: 22 MG/DL — SIGNIFICANT CHANGE UP (ref 7–23)
CALCIUM SERPL-MCNC: 8.3 MG/DL — LOW (ref 8.4–10.5)
CHLORIDE SERPL-SCNC: 100 MMOL/L — SIGNIFICANT CHANGE UP (ref 98–107)
CO2 SERPL-SCNC: 22 MMOL/L — SIGNIFICANT CHANGE UP (ref 22–31)
CREAT SERPL-MCNC: 0.77 MG/DL — SIGNIFICANT CHANGE UP (ref 0.5–1.3)
EGFR: 75 ML/MIN/1.73M2 — SIGNIFICANT CHANGE UP
EOSINOPHIL # BLD AUTO: 0 K/UL — SIGNIFICANT CHANGE UP (ref 0–0.5)
EOSINOPHIL NFR BLD AUTO: 0 % — SIGNIFICANT CHANGE UP (ref 0–6)
GLUCOSE SERPL-MCNC: 203 MG/DL — HIGH (ref 70–99)
HCT VFR BLD CALC: 39.5 % — SIGNIFICANT CHANGE UP (ref 34.5–45)
HGB BLD-MCNC: 12.5 G/DL — SIGNIFICANT CHANGE UP (ref 11.5–15.5)
IANC: 13.69 K/UL — HIGH (ref 1.8–7.4)
IMM GRANULOCYTES NFR BLD AUTO: 2.1 % — HIGH (ref 0–0.9)
LYMPHOCYTES # BLD AUTO: 1.03 K/UL — SIGNIFICANT CHANGE UP (ref 1–3.3)
LYMPHOCYTES # BLD AUTO: 6.7 % — LOW (ref 13–44)
MAGNESIUM SERPL-MCNC: 2.4 MG/DL — SIGNIFICANT CHANGE UP (ref 1.6–2.6)
MCHC RBC-ENTMCNC: 28.1 PG — SIGNIFICANT CHANGE UP (ref 27–34)
MCHC RBC-ENTMCNC: 31.6 GM/DL — LOW (ref 32–36)
MCV RBC AUTO: 88.8 FL — SIGNIFICANT CHANGE UP (ref 80–100)
MONOCYTES # BLD AUTO: 0.39 K/UL — SIGNIFICANT CHANGE UP (ref 0–0.9)
MONOCYTES NFR BLD AUTO: 2.5 % — SIGNIFICANT CHANGE UP (ref 2–14)
NEUTROPHILS # BLD AUTO: 13.69 K/UL — HIGH (ref 1.8–7.4)
NEUTROPHILS NFR BLD AUTO: 88.5 % — HIGH (ref 43–77)
NRBC # BLD: 0 /100 WBCS — SIGNIFICANT CHANGE UP (ref 0–0)
NRBC # FLD: 0 K/UL — SIGNIFICANT CHANGE UP (ref 0–0)
PHOSPHATE SERPL-MCNC: 3.4 MG/DL — SIGNIFICANT CHANGE UP (ref 2.5–4.5)
PLATELET # BLD AUTO: 292 K/UL — SIGNIFICANT CHANGE UP (ref 150–400)
POTASSIUM SERPL-MCNC: 4.9 MMOL/L — SIGNIFICANT CHANGE UP (ref 3.5–5.3)
POTASSIUM SERPL-SCNC: 4.9 MMOL/L — SIGNIFICANT CHANGE UP (ref 3.5–5.3)
RBC # BLD: 4.45 M/UL — SIGNIFICANT CHANGE UP (ref 3.8–5.2)
RBC # FLD: 14.5 % — SIGNIFICANT CHANGE UP (ref 10.3–14.5)
SARS-COV-2 RNA SPEC QL NAA+PROBE: SIGNIFICANT CHANGE UP
SODIUM SERPL-SCNC: 135 MMOL/L — SIGNIFICANT CHANGE UP (ref 135–145)
WBC # BLD: 15.47 K/UL — HIGH (ref 3.8–10.5)
WBC # FLD AUTO: 15.47 K/UL — HIGH (ref 3.8–10.5)

## 2022-11-16 RX ORDER — HYDROCORTISONE 1 %
1 OINTMENT (GRAM) TOPICAL
Refills: 0 | Status: DISCONTINUED | OUTPATIENT
Start: 2022-11-16 | End: 2022-11-19

## 2022-11-16 RX ORDER — ZINC OXIDE 200 MG/G
1 OINTMENT TOPICAL
Refills: 0 | Status: DISCONTINUED | OUTPATIENT
Start: 2022-11-16 | End: 2022-11-19

## 2022-11-16 RX ORDER — ALPRAZOLAM 0.25 MG
0.25 TABLET ORAL AT BEDTIME
Refills: 0 | Status: DISCONTINUED | OUTPATIENT
Start: 2022-11-16 | End: 2022-11-16

## 2022-11-16 RX ORDER — ZINC OXIDE 200 MG/G
1 OINTMENT TOPICAL
Refills: 0 | Status: DISCONTINUED | OUTPATIENT
Start: 2022-11-16 | End: 2022-11-16

## 2022-11-16 RX ADMIN — Medication 200 MILLIGRAM(S): at 17:45

## 2022-11-16 RX ADMIN — Medication 3 MILLILITER(S): at 08:57

## 2022-11-16 RX ADMIN — BUDESONIDE AND FORMOTEROL FUMARATE DIHYDRATE 2 PUFF(S): 160; 4.5 AEROSOL RESPIRATORY (INHALATION) at 08:54

## 2022-11-16 RX ADMIN — Medication 200 MILLIGRAM(S): at 13:17

## 2022-11-16 RX ADMIN — ATORVASTATIN CALCIUM 20 MILLIGRAM(S): 80 TABLET, FILM COATED ORAL at 21:43

## 2022-11-16 RX ADMIN — Medication 88 MICROGRAM(S): at 06:43

## 2022-11-16 RX ADMIN — CEFTRIAXONE 100 MILLIGRAM(S): 500 INJECTION, POWDER, FOR SOLUTION INTRAMUSCULAR; INTRAVENOUS at 13:26

## 2022-11-16 RX ADMIN — Medication 100 MILLIGRAM(S): at 21:44

## 2022-11-16 RX ADMIN — Medication 1: at 08:43

## 2022-11-16 RX ADMIN — BUDESONIDE AND FORMOTEROL FUMARATE DIHYDRATE 2 PUFF(S): 160; 4.5 AEROSOL RESPIRATORY (INHALATION) at 21:43

## 2022-11-16 RX ADMIN — Medication 1: at 17:45

## 2022-11-16 RX ADMIN — Medication 100 MILLIGRAM(S): at 13:19

## 2022-11-16 RX ADMIN — Medication 40 MILLIGRAM(S): at 06:44

## 2022-11-16 RX ADMIN — PANTOPRAZOLE SODIUM 40 MILLIGRAM(S): 20 TABLET, DELAYED RELEASE ORAL at 06:43

## 2022-11-16 RX ADMIN — Medication 200 MILLIGRAM(S): at 06:42

## 2022-11-16 RX ADMIN — Medication 1 SUPPOSITORY(S): at 23:07

## 2022-11-16 RX ADMIN — Medication 1: at 13:19

## 2022-11-16 RX ADMIN — AMLODIPINE BESYLATE 2.5 MILLIGRAM(S): 2.5 TABLET ORAL at 06:42

## 2022-11-16 RX ADMIN — MONTELUKAST 10 MILLIGRAM(S): 4 TABLET, CHEWABLE ORAL at 13:18

## 2022-11-16 RX ADMIN — ENOXAPARIN SODIUM 40 MILLIGRAM(S): 100 INJECTION SUBCUTANEOUS at 06:43

## 2022-11-16 RX ADMIN — Medication 200 MILLIGRAM(S): at 23:07

## 2022-11-16 RX ADMIN — Medication 3 MILLILITER(S): at 15:05

## 2022-11-16 RX ADMIN — Medication 100 MILLIGRAM(S): at 06:49

## 2022-11-16 RX ADMIN — Medication 25 MILLIGRAM(S): at 06:43

## 2022-11-16 RX ADMIN — Medication 200 MILLIGRAM(S): at 00:20

## 2022-11-16 NOTE — PROGRESS NOTE ADULT - SUBJECTIVE AND OBJECTIVE BOX
S/  Pt SOB and cough improving  Pleuritic chest pain improving    ROS: LBM improved      O/  T, P, R, SpO2, BP    Temperature  Temp (F): 98.3 Degrees F  Temp (C) Temp (C): 36.8 Degrees C  Temp site Temp Site: oral    Heart Rate  Heart Rate Heart Rate (beats/min): 72 /min  Heart Rate Method Method: noninvasive blood pressure monitor    Noninvasive Blood Pressure  BP Systolic Systolic: 138 mm Hg  BP Diastolic Diastolic (mm Hg): 65 mm Hg  Blood Pressure - Method Method: electronic    Respiratory/Pulse Oximetry/Oxygen Therapy  Respiration Rate (breaths/min) Respiration Rate (breaths/min): 18 /min  SpO2 (%) SpO2 (%): 99 %  O2 Delivery/Oxygen Delivery Method Patient On (Oxygen Delivery Method): room air    Neck: no JVP  Lungs: occ rhonchi; no wheezing or rales  CVS: s1s2 NRRR no murmurs or gallops  Abd: mild epigastric tender; +BS; soft  Ext: no edema  CNS: aao x 3 non focal  Skin: no lesions    WBC 21 to 18 to 15 K   Telemetry

## 2022-11-16 NOTE — PROGRESS NOTE ADULT - SUBJECTIVE AND OBJECTIVE BOX
[place]  87y  Female      Patient is a 87y old  Female who presents with a chief complaint of SOB and cough (16 Nov 2022 15:15)  c/o hemorrhoids,diarrhoea stopped.no abd pain,no sob,no cp    REVIEW OF SYSTEMS:  CONSTITUTIONAL: No fever  RESPIRATORY: No cough, hemoptysis or shortness of breath  CARDIOVASCULAR: No chest pain, palpitations, dizziness, or leg swelling  GASTROINTESTINAL: No abdominal pain. nausea, vomiting, hematemesis  GENITOURINARY: No dysuria, frequency, hematuria   NEUROLOGICAL: No headaches, no dizziness  MUSCULOSKELETAL: No joint pain or swelling;     INTERVAL HPI/OVERNIGHT EVENTS:  T(C): 36.7 (11-16-22 @ 11:52), Max: 36.8 (11-16-22 @ 06:30)  HR: 78 (11-16-22 @ 15:21) (72 - 83)  BP: 134/68 (11-16-22 @ 11:52) (134/68 - 142/78)  RR: 17 (11-16-22 @ 11:52) (17 - 18)  SpO2: 98% (11-16-22 @ 15:21) (95% - 99%)  Wt(kg): --  I&O's Summary    16 Nov 2022 07:01  -  16 Nov 2022 20:11  --------------------------------------------------------  IN: 240 mL / OUT: 0 mL / NET: 240 mL      T(C): 36.7 (11-16-22 @ 11:52), Max: 36.8 (11-16-22 @ 06:30)  HR: 78 (11-16-22 @ 15:21) (72 - 83)  BP: 134/68 (11-16-22 @ 11:52) (134/68 - 142/78)  RR: 17 (11-16-22 @ 11:52) (17 - 18)  SpO2: 98% (11-16-22 @ 15:21) (95% - 99%)  Wt(kg): --Vital Signs Last 24 Hrs  T(C): 36.7 (16 Nov 2022 11:52), Max: 36.8 (16 Nov 2022 06:30)  T(F): 98.1 (16 Nov 2022 11:52), Max: 98.3 (16 Nov 2022 06:30)  HR: 78 (16 Nov 2022 15:21) (72 - 83)  BP: 134/68 (16 Nov 2022 11:52) (134/68 - 142/78)  BP(mean): --  RR: 17 (16 Nov 2022 11:52) (17 - 18)  SpO2: 98% (16 Nov 2022 15:21) (95% - 99%)    Parameters below as of 16 Nov 2022 15:21  Patient On (Oxygen Delivery Method): room air        LABS:                        12.5   15.47 )-----------( 292      ( 16 Nov 2022 05:50 )             39.5     11-16    135  |  100  |  22  ----------------------------<  203<H>  4.9   |  22  |  0.77    Ca    8.3<L>      16 Nov 2022 05:50  Phos  3.4     11-16  Mg     2.40     11-16          CAPILLARY BLOOD GLUCOSE      POCT Blood Glucose.: 197 mg/dL (16 Nov 2022 17:06)  POCT Blood Glucose.: 173 mg/dL (16 Nov 2022 12:22)  POCT Blood Glucose.: 166 mg/dL (16 Nov 2022 08:28)  POCT Blood Glucose.: 173 mg/dL (15 Nov 2022 22:25)            PAST MEDICAL & SURGICAL HISTORY:  Cardiac arrest      HTN (hypertension)      COPD exacerbation      CAD (coronary artery disease)      Hypothyroid          MEDICATIONS  (STANDING):  albuterol/ipratropium for Nebulization 3 milliLiter(s) Nebulizer every 6 hours  ALPRAZolam 0.25 milliGRAM(s) Oral at bedtime  amLODIPine   Tablet 2.5 milliGRAM(s) Oral daily  atorvastatin 20 milliGRAM(s) Oral at bedtime  benzonatate 100 milliGRAM(s) Oral every 8 hours  budesonide 160 MICROgram(s)/formoterol 4.5 MICROgram(s) Inhaler 2 Puff(s) Inhalation two times a day  cefTRIAXone   IVPB 1000 milliGRAM(s) IV Intermittent every 24 hours  dextrose 5%. 1000 milliLiter(s) (100 mL/Hr) IV Continuous <Continuous>  dextrose 5%. 1000 milliLiter(s) (50 mL/Hr) IV Continuous <Continuous>  dextrose 50% Injectable 25 Gram(s) IV Push once  dextrose 50% Injectable 12.5 Gram(s) IV Push once  dextrose 50% Injectable 25 Gram(s) IV Push once  enoxaparin Injectable 40 milliGRAM(s) SubCutaneous every 24 hours  glucagon  Injectable 1 milliGRAM(s) IntraMuscular once  guaiFENesin Oral Liquid (Sugar-Free) 200 milliGRAM(s) Oral every 6 hours  hydrocortisone hemorrhoidal Suppository 1 Suppository(s) Rectal two times a day  influenza  Vaccine (HIGH DOSE) 0.7 milliLiter(s) IntraMuscular once  insulin lispro (ADMELOG) corrective regimen sliding scale   SubCutaneous three times a day before meals  insulin lispro (ADMELOG) corrective regimen sliding scale   SubCutaneous at bedtime  lactobacillus acidophilus 1 Tablet(s) Oral at bedtime  levothyroxine 88 MICROGram(s) Oral daily  metoprolol succinate ER 25 milliGRAM(s) Oral daily  montelukast 10 milliGRAM(s) Oral daily  pantoprazole    Tablet 40 milliGRAM(s) Oral before breakfast  tiotropium 18 MICROgram(s) Capsule 1 Capsule(s) Inhalation daily  zinc oxide 20% Ointment 1 Application(s) Topical two times a day    MEDICATIONS  (PRN):  acetaminophen     Tablet .. 650 milliGRAM(s) Oral every 6 hours PRN Temp greater or equal to 38C (100.4F), Mild Pain (1 - 3)  aluminum hydroxide/magnesium hydroxide/simethicone Suspension 30 milliLiter(s) Oral every 4 hours PRN Dyspepsia  artificial  tears Solution 1 Drop(s) Both EYES three times a day PRN Dry Eyes  dextrose Oral Gel 15 Gram(s) Oral once PRN Blood Glucose LESS THAN 70 milliGRAM(s)/deciliter  guaiFENesin Oral Liquid (Sugar-Free) 200 milliGRAM(s) Oral every 6 hours PRN Cough        RADIOLOGY & ADDITIONAL TESTS:    Imaging Personally Reviewed:  [ ] YES  [ ] NO    Consultant(s) Notes Reviewed:  [x ] YES  [ ] NO    PHYSICAL EXAM:  GENERAL: Alert and awake lying in bed in no distress  HEAD:  Atraumatic, Normocephalic  EYES: EOMI, LEW, conjunctiva and sclera clear  NECK: Supple, No JVD, Normal thyroid  NERVOUS SYSTEM:  Alert & Oriented X3, Motor and sensory systems are intact,   CHEST/LUNG: Bilateral clear breath sounds, no rhochi, no wheezing, no crepitations,  HEART: Regular rate and rhythm; No murmurs, rubs, or gallops  ABDOMEN: Soft, Nontender, Nondistended; Bowel sounds present  EXTREMITIES:   Peripheral Pulses are palpable, no  edema        Care Discussed with Consultants/Other Providers [ ] YES  [ ] NO      Code Status: [] Full Code [] DNR [] DNI [] Goals of Care:   Disposition: [] ICU [] Stroke Unit [] RCU []PCU []Floor [] Discharge Home         [facility] Campos.[facility]

## 2022-11-16 NOTE — PROGRESS NOTE ADULT - TIME BILLING
-Diarrhoea-d/c miralax.give gentle iVF-d/c .D/W ACP,Family on phone.  -Hemorrhoids-Anusol suppository as ordered

## 2022-11-16 NOTE — PROGRESS NOTE ADULT - ASSESSMENT
This is a 87-year-old female with past medical history of coronary artery disease, stents, cardiac arrest, hyperlipidemia, hypertension, diabetes, asthma, hypothyroidism, cholecystectomy, presenting to the emergency department as directed by her cardiologist for complaint of shortness of breath, chest pain, epigastric pain, and cough increasing over the course of the past 3 weeks.  Patient treated with antibiotics for presumed pneumonia 3 weeks ago without significant improvement. She was non compliant with Augmentin.  Also treated with albuterol inhaler and steroids for ongoing shortness of breath.  Today patient and daughter report lack of improvement.  In office today patient severely weak and physician with concern for pneumonia versus cardiac etiology for her symptoms, and directed to emergency department.  Patient ambulatory with cane at baseline, currently too weak and to ambulate independently.  She had a CT performed which shows RUL opacities. (12 Nov 2022 10:51)     above noed:    she used to [employer] smoking in [place] before:  she is on Symbicort at home   never had covid infection:  and she has mild cough:

## 2022-11-16 NOTE — PROGRESS NOTE ADULT - ASSESSMENT
87-year-old female with underlying history significant for coronary artery disease s/p PCI stents LAD/RCA,, cardiac arrest, hyperlipidemia, hypertension, diabetes, chronic diastolic CHF, GERD, asthma, hypothyroidism, cholecystectomy who presented to ED via EMS    Chest pain --pleuritic type due to PNA  Sepsis due to RUL PNA  RSV infection  Acute Hypoxic Resp Failure due to RUL PNA and RSV infection  Chronic Diastolic CHF  hx CAD s/p MI PCI stents to LAD and RCA  DM, HTN, HLD  GERD    Telemetry  resume home cardiac meds  IV antibiotics per Medical / ID team  Gentle IVF  PPI and probiotics  d/w Medical Attending covering yesterday  PT evaluation

## 2022-11-16 NOTE — PROGRESS NOTE ADULT - SUBJECTIVE AND OBJECTIVE BOX
Date of Service: 11-16-22 @ 17:06    Patient is a 87y old  Female who presents with a chief complaint of SOB and cough (16 Nov 2022 15:15)      Any change in ROS: she feels O but feels anxious:     MEDICATIONS  (STANDING):  albuterol/ipratropium for Nebulization 3 milliLiter(s) Nebulizer every 6 hours  amLODIPine   Tablet 2.5 milliGRAM(s) Oral daily  atorvastatin 20 milliGRAM(s) Oral at bedtime  benzonatate 100 milliGRAM(s) Oral every 8 hours  budesonide 160 MICROgram(s)/formoterol 4.5 MICROgram(s) Inhaler 2 Puff(s) Inhalation two times a day  cefTRIAXone   IVPB 1000 milliGRAM(s) IV Intermittent every 24 hours  dextrose 5%. 1000 milliLiter(s) (50 mL/Hr) IV Continuous <Continuous>  dextrose 5%. 1000 milliLiter(s) (100 mL/Hr) IV Continuous <Continuous>  dextrose 50% Injectable 25 Gram(s) IV Push once  dextrose 50% Injectable 12.5 Gram(s) IV Push once  dextrose 50% Injectable 25 Gram(s) IV Push once  enoxaparin Injectable 40 milliGRAM(s) SubCutaneous every 24 hours  glucagon  Injectable 1 milliGRAM(s) IntraMuscular once  guaiFENesin Oral Liquid (Sugar-Free) 200 milliGRAM(s) Oral every 6 hours  influenza  Vaccine (HIGH DOSE) 0.7 milliLiter(s) IntraMuscular once  insulin lispro (ADMELOG) corrective regimen sliding scale   SubCutaneous three times a day before meals  insulin lispro (ADMELOG) corrective regimen sliding scale   SubCutaneous at bedtime  lactobacillus acidophilus 1 Tablet(s) Oral at bedtime  levothyroxine 88 MICROGram(s) Oral daily  metoprolol succinate ER 25 milliGRAM(s) Oral daily  montelukast 10 milliGRAM(s) Oral daily  pantoprazole    Tablet 40 milliGRAM(s) Oral before breakfast  sodium chloride 0.9%. 500 milliLiter(s) (50 mL/Hr) IV Continuous <Continuous>  tiotropium 18 MICROgram(s) Capsule 1 Capsule(s) Inhalation daily  zinc oxide 40% Paste 1 Application(s) Topical two times a day    MEDICATIONS  (PRN):  acetaminophen     Tablet .. 650 milliGRAM(s) Oral every 6 hours PRN Temp greater or equal to 38C (100.4F), Mild Pain (1 - 3)  aluminum hydroxide/magnesium hydroxide/simethicone Suspension 30 milliLiter(s) Oral every 4 hours PRN Dyspepsia  artificial  tears Solution 1 Drop(s) Both EYES three times a day PRN Dry Eyes  dextrose Oral Gel 15 Gram(s) Oral once PRN Blood Glucose LESS THAN 70 milliGRAM(s)/deciliter  guaiFENesin Oral Liquid (Sugar-Free) 200 milliGRAM(s) Oral every 6 hours PRN Cough    Vital Signs Last 24 Hrs  T(C): 36.7 (16 Nov 2022 11:52), Max: 36.8 (16 Nov 2022 06:30)  T(F): 98.1 (16 Nov 2022 11:52), Max: 98.3 (16 Nov 2022 06:30)  HR: 78 (16 Nov 2022 15:21) (72 - 83)  BP: 134/68 (16 Nov 2022 11:52) (134/68 - 142/78)  BP(mean): --  RR: 17 (16 Nov 2022 11:52) (17 - 18)  SpO2: 98% (16 Nov 2022 15:21) (95% - 99%)    Parameters below as of 16 Nov 2022 15:21  Patient On (Oxygen Delivery Method): room air        I&O's Summary    16 Nov 2022 07:01  -  16 Nov 2022 17:06  --------------------------------------------------------  IN: 240 mL / OUT: 0 mL / NET: 240 mL          Physical Exam:   GENERAL: NAD, well-groomed, well-developed  HEENT: LEW/   Atraumatic, Normocephalic  ENMT: No tonsillar erythema, exudates, or enlargement; Moist mucous membranes, Good dentition, No lesions  NECK: Supple, No JVD, Normal thyroid  CHEST/LUNG: mild wheezing:   CVS: Regular rate and rhythm; No murmurs, rubs, or gallops  GI: : Soft, Nontender, Nondistended; Bowel sounds present  NERVOUS SYSTEM:  Alert & Oriented X3  EXTREMITIES:  - edema  LYMPH: No lymphadenopathy noted  SKIN: No rashes or lesions  ENDOCRINOLOGY: No Thyromegaly  PSYCH: Appropriate    Labs:  33  CARDIAC MARKERS ( 14 Nov 2022 23:16 )  x     / x     / 17 U/L / x     / 2.5 ng/mL                            12.5   15.47 )-----------( 292      ( 16 Nov 2022 05:50 )             39.5                         12.7   18.58 )-----------( 332      ( 15 Nov 2022 06:20 )             40.2                         13.0   21.85 )-----------( 358      ( 14 Nov 2022 23:16 )             41.1                         13.2   28.23 )-----------( 345      ( 14 Nov 2022 06:04 )             42.9                         14.4   20.86 )-----------( 340      ( 13 Nov 2022 07:25 )             45.7     11-16    135  |  100  |  22  ----------------------------<  203<H>  4.9   |  22  |  0.77  11-15    136  |  99  |  18  ----------------------------<  185<H>  4.4   |  25  |  0.65  11-14    133<L>  |  96<L>  |  20  ----------------------------<  215<H>  4.2   |  25  |  0.71  11-14    136  |  99  |  23  ----------------------------<  155<H>  4.0   |  26  |  0.77  11-13    135  |  97<L>  |  15  ----------------------------<  237<H>  5.0   |  30  |  0.77    Ca    8.3<L>      16 Nov 2022 05:50  Ca    8.5      15 Nov 2022 06:20  Ca    8.8      14 Nov 2022 23:16  Phos  3.4     11-16  Phos  3.7     11-15  Phos  3.4     11-14  Mg     2.40     11-16  Mg     2.30     11-15  Mg     2.30     11-14    TPro  7.3  /  Alb  3.6  /  TBili  0.3  /  DBili  x   /  AST  12  /  ALT  18  /  AlkPhos  84  11-13    CAPILLARY BLOOD GLUCOSE      POCT Blood Glucose.: 173 mg/dL (16 Nov 2022 12:22)  POCT Blood Glucose.: 166 mg/dL (16 Nov 2022 08:28)  POCT Blood Glucose.: 173 mg/dL (15 Nov 2022 22:25)  POCT Blood Glucose.: 139 mg/dL (15 Nov 2022 17:12)            r< from: CT Chest w/ IV Cont (11.11.22 @ 23:15) >  PLEURA: Trace right pleural effusion. Left posterior fat-containing   Bochdalek hernia.  VESSELS: Atherosclerotic changes of the aorta and coronary arteries.  HEART: Heart size is normal. No pericardial effusion.  MEDIASTINUM AND ASHA: No lymphadenopathy.  CHEST WALL AND LOWER NECK: Within normal limits.    ABDOMEN AND PELVIS:  LIVER: Within normal limits.  BILE DUCTS: Nonspecific CBD dilatation and mild intrahepatic ductal   dilatation in the setting of prior cholecystectomy.  GALLBLADDER: Cholecystectomy.  SPLEEN: Within normal limits.  PANCREAS: Within normal limits.  ADRENALS: Within normal limits.  KIDNEYS/URETERS: No hydronephrosis. Subcentimeter hypodensities   bilaterally, too small to characterize.    BLADDER: Within normal limits.  REPRODUCTIVE ORGANS: Uterus and adnexa within normal limits.    BOWEL: No bowel obstruction. Portions of the colon are degraded by motion   artifact. Appendix is normal.  PERITONEUM: No ascites.  VESSELS: Atherosclerotic changes.  RETROPERITONEUM/LYMPH NODES: Nolymphadenopathy.  ABDOMINAL WALL: Small fat-containing right inguinal hernia.  BONES: Degenerative changes.    IMPRESSION: Motion degraded exam.  Clustered nodular opacities in the right upper lobe and lingula may be   infectious/inflammatory in nature.    Trace right pleural effusion.    No acute abdominal pathology.    --- End of Report ---    < end of copied text >        RECENT CULTURES:  11-11 @ 23:50 Clean Catch Clean Catch (Midstream)                <10,000 CFU/mL Normal Urogenital Barbara    11-11 @ 20:55 .Blood Blood-Peripheral                No growth to date.    11-11 @ 20:30 .Blood Blood-Peripheral                No growth to date.          RESPIRATORY CULTURES:          Studies  Chest X-RAY  CT SCAN Chest   Venous Dopplers: LE:   CT Abdomen  Others

## 2022-11-17 LAB
ANION GAP SERPL CALC-SCNC: 6 MMOL/L — LOW (ref 7–14)
BASOPHILS # BLD AUTO: 0.03 K/UL — SIGNIFICANT CHANGE UP (ref 0–0.2)
BASOPHILS NFR BLD AUTO: 0.2 % — SIGNIFICANT CHANGE UP (ref 0–2)
BUN SERPL-MCNC: 18 MG/DL — SIGNIFICANT CHANGE UP (ref 7–23)
CALCIUM SERPL-MCNC: 8.2 MG/DL — LOW (ref 8.4–10.5)
CHLORIDE SERPL-SCNC: 105 MMOL/L — SIGNIFICANT CHANGE UP (ref 98–107)
CO2 SERPL-SCNC: 30 MMOL/L — SIGNIFICANT CHANGE UP (ref 22–31)
CREAT SERPL-MCNC: 0.68 MG/DL — SIGNIFICANT CHANGE UP (ref 0.5–1.3)
CULTURE RESULTS: SIGNIFICANT CHANGE UP
CULTURE RESULTS: SIGNIFICANT CHANGE UP
EGFR: 85 ML/MIN/1.73M2 — SIGNIFICANT CHANGE UP
EOSINOPHIL # BLD AUTO: 0.01 K/UL — SIGNIFICANT CHANGE UP (ref 0–0.5)
EOSINOPHIL NFR BLD AUTO: 0.1 % — SIGNIFICANT CHANGE UP (ref 0–6)
GLUCOSE SERPL-MCNC: 132 MG/DL — HIGH (ref 70–99)
HCT VFR BLD CALC: 36.8 % — SIGNIFICANT CHANGE UP (ref 34.5–45)
HGB BLD-MCNC: 11.8 G/DL — SIGNIFICANT CHANGE UP (ref 11.5–15.5)
IANC: 10.68 K/UL — HIGH (ref 1.8–7.4)
IMM GRANULOCYTES NFR BLD AUTO: 2 % — HIGH (ref 0–0.9)
LYMPHOCYTES # BLD AUTO: 14.7 % — SIGNIFICANT CHANGE UP (ref 13–44)
LYMPHOCYTES # BLD AUTO: 2.08 K/UL — SIGNIFICANT CHANGE UP (ref 1–3.3)
MAGNESIUM SERPL-MCNC: 2.1 MG/DL — SIGNIFICANT CHANGE UP (ref 1.6–2.6)
MCHC RBC-ENTMCNC: 27.6 PG — SIGNIFICANT CHANGE UP (ref 27–34)
MCHC RBC-ENTMCNC: 32.1 GM/DL — SIGNIFICANT CHANGE UP (ref 32–36)
MCV RBC AUTO: 86.2 FL — SIGNIFICANT CHANGE UP (ref 80–100)
MONOCYTES # BLD AUTO: 1.06 K/UL — HIGH (ref 0–0.9)
MONOCYTES NFR BLD AUTO: 7.5 % — SIGNIFICANT CHANGE UP (ref 2–14)
NEUTROPHILS # BLD AUTO: 10.68 K/UL — HIGH (ref 1.8–7.4)
NEUTROPHILS NFR BLD AUTO: 75.5 % — SIGNIFICANT CHANGE UP (ref 43–77)
NRBC # BLD: 0 /100 WBCS — SIGNIFICANT CHANGE UP (ref 0–0)
NRBC # FLD: 0 K/UL — SIGNIFICANT CHANGE UP (ref 0–0)
PHOSPHATE SERPL-MCNC: 2.7 MG/DL — SIGNIFICANT CHANGE UP (ref 2.5–4.5)
PLATELET # BLD AUTO: 317 K/UL — SIGNIFICANT CHANGE UP (ref 150–400)
POTASSIUM SERPL-MCNC: 3.7 MMOL/L — SIGNIFICANT CHANGE UP (ref 3.5–5.3)
POTASSIUM SERPL-SCNC: 3.7 MMOL/L — SIGNIFICANT CHANGE UP (ref 3.5–5.3)
RBC # BLD: 4.27 M/UL — SIGNIFICANT CHANGE UP (ref 3.8–5.2)
RBC # FLD: 14.5 % — SIGNIFICANT CHANGE UP (ref 10.3–14.5)
SODIUM SERPL-SCNC: 141 MMOL/L — SIGNIFICANT CHANGE UP (ref 135–145)
SPECIMEN SOURCE: SIGNIFICANT CHANGE UP
SPECIMEN SOURCE: SIGNIFICANT CHANGE UP
WBC # BLD: 14.15 K/UL — HIGH (ref 3.8–10.5)
WBC # FLD AUTO: 14.15 K/UL — HIGH (ref 3.8–10.5)

## 2022-11-17 RX ORDER — IPRATROPIUM/ALBUTEROL SULFATE 18-103MCG
3 AEROSOL WITH ADAPTER (GRAM) INHALATION EVERY 6 HOURS
Refills: 0 | Status: DISCONTINUED | OUTPATIENT
Start: 2022-11-17 | End: 2022-11-19

## 2022-11-17 RX ORDER — METOPROLOL TARTRATE 50 MG
50 TABLET ORAL DAILY
Refills: 0 | Status: DISCONTINUED | OUTPATIENT
Start: 2022-11-17 | End: 2022-11-19

## 2022-11-17 RX ORDER — LOSARTAN POTASSIUM 100 MG/1
25 TABLET, FILM COATED ORAL DAILY
Refills: 0 | Status: DISCONTINUED | OUTPATIENT
Start: 2022-11-17 | End: 2022-11-19

## 2022-11-17 RX ORDER — LOSARTAN POTASSIUM 100 MG/1
25 TABLET, FILM COATED ORAL DAILY
Refills: 0 | Status: DISCONTINUED | OUTPATIENT
Start: 2022-11-17 | End: 2022-11-17

## 2022-11-17 RX ADMIN — Medication 100 MILLIGRAM(S): at 05:31

## 2022-11-17 RX ADMIN — Medication 200 MILLIGRAM(S): at 21:49

## 2022-11-17 RX ADMIN — Medication 200 MILLIGRAM(S): at 05:31

## 2022-11-17 RX ADMIN — Medication 200 MILLIGRAM(S): at 17:36

## 2022-11-17 RX ADMIN — ZINC OXIDE 1 APPLICATION(S): 200 OINTMENT TOPICAL at 17:35

## 2022-11-17 RX ADMIN — Medication 100 MILLIGRAM(S): at 21:49

## 2022-11-17 RX ADMIN — ATORVASTATIN CALCIUM 20 MILLIGRAM(S): 80 TABLET, FILM COATED ORAL at 21:48

## 2022-11-17 RX ADMIN — AMLODIPINE BESYLATE 2.5 MILLIGRAM(S): 2.5 TABLET ORAL at 05:30

## 2022-11-17 RX ADMIN — BUDESONIDE AND FORMOTEROL FUMARATE DIHYDRATE 2 PUFF(S): 160; 4.5 AEROSOL RESPIRATORY (INHALATION) at 08:57

## 2022-11-17 RX ADMIN — PANTOPRAZOLE SODIUM 40 MILLIGRAM(S): 20 TABLET, DELAYED RELEASE ORAL at 05:30

## 2022-11-17 RX ADMIN — Medication 1 TABLET(S): at 21:52

## 2022-11-17 RX ADMIN — LOSARTAN POTASSIUM 25 MILLIGRAM(S): 100 TABLET, FILM COATED ORAL at 18:42

## 2022-11-17 RX ADMIN — BUDESONIDE AND FORMOTEROL FUMARATE DIHYDRATE 2 PUFF(S): 160; 4.5 AEROSOL RESPIRATORY (INHALATION) at 21:48

## 2022-11-17 RX ADMIN — Medication 88 MICROGRAM(S): at 05:31

## 2022-11-17 RX ADMIN — Medication 1 SUPPOSITORY(S): at 17:35

## 2022-11-17 RX ADMIN — CEFTRIAXONE 100 MILLIGRAM(S): 500 INJECTION, POWDER, FOR SOLUTION INTRAMUSCULAR; INTRAVENOUS at 13:53

## 2022-11-17 RX ADMIN — ENOXAPARIN SODIUM 40 MILLIGRAM(S): 100 INJECTION SUBCUTANEOUS at 05:32

## 2022-11-17 RX ADMIN — ZINC OXIDE 1 APPLICATION(S): 200 OINTMENT TOPICAL at 05:35

## 2022-11-17 RX ADMIN — Medication 1 SUPPOSITORY(S): at 08:57

## 2022-11-17 RX ADMIN — MONTELUKAST 10 MILLIGRAM(S): 4 TABLET, CHEWABLE ORAL at 13:52

## 2022-11-17 RX ADMIN — Medication 25 MILLIGRAM(S): at 05:29

## 2022-11-17 RX ADMIN — Medication 100 MILLIGRAM(S): at 13:53

## 2022-11-17 NOTE — PROGRESS NOTE ADULT - ASSESSMENT
87-year-old female with underlying history significant for coronary artery disease s/p PCI stents LAD/RCA,, cardiac arrest, hyperlipidemia, hypertension, diabetes, chronic diastolic CHF, GERD, asthma, hypothyroidism, cholecystectomy who presented to ED via EMS    Chest pain --pleuritic type due to PNA  Sepsis due to RUL PNA  RSV infection  Acute COPD exac  Acute Hypoxic Resp Failure due to RUL PNA and RSV infection  Chronic Diastolic CHF  hx CAD s/p MI PCI stents to LAD and RCA  DM, HTN, HLD  Uncontrolled HTN  GERD    Telemetry  add losartan 25mg daily for HTN  IV antibiotics per Medical / ID team  PPI and probiotics  PT evaluation and discharge planning per Medical  87-year-old female with underlying history significant for coronary artery disease s/p PCI stents LAD/RCA,, cardiac arrest, hyperlipidemia, hypertension, diabetes, chronic diastolic CHF, GERD, asthma, hypothyroidism, cholecystectomy who presented to ED via EMS    Chest pain --pleuritic type due to PNA  Sepsis due to RUL PNA  RSV infection  Acute COPD exac  Acute Hypoxic Resp Failure due to RUL PNA and RSV infection  Chronic Diastolic CHF  hx CAD s/p MI PCI stents to LAD and RCA  DM, HTN, HLD  Uncontrolled HTN  GERD    Telemetry  add losartan 25mg daily for HTN  increase metoprolol succinate 50mg daily for HTN and tachycardia  IV antibiotics per Medical / ID team  PPI and probiotics  PT evaluation

## 2022-11-17 NOTE — PROGRESS NOTE ADULT - SUBJECTIVE AND OBJECTIVE BOX
Date of Service: 11-17-22 @ 14:58    Patient is a 87y old  Female who presents with a chief complaint of SOB and cough (16 Nov 2022 15:15)      Any change in ROS: She neil OK: cough is better : wheezing is better:   daughter  is at bedside:       MEDICATIONS  (STANDING):  albuterol/ipratropium for Nebulization 3 milliLiter(s) Nebulizer every 6 hours  amLODIPine   Tablet 2.5 milliGRAM(s) Oral daily  atorvastatin 20 milliGRAM(s) Oral at bedtime  benzonatate 100 milliGRAM(s) Oral every 8 hours  budesonide 160 MICROgram(s)/formoterol 4.5 MICROgram(s) Inhaler 2 Puff(s) Inhalation two times a day  cefTRIAXone   IVPB 1000 milliGRAM(s) IV Intermittent every 24 hours  dextrose 5%. 1000 milliLiter(s) (50 mL/Hr) IV Continuous <Continuous>  dextrose 5%. 1000 milliLiter(s) (100 mL/Hr) IV Continuous <Continuous>  dextrose 50% Injectable 25 Gram(s) IV Push once  dextrose 50% Injectable 12.5 Gram(s) IV Push once  dextrose 50% Injectable 25 Gram(s) IV Push once  enoxaparin Injectable 40 milliGRAM(s) SubCutaneous every 24 hours  glucagon  Injectable 1 milliGRAM(s) IntraMuscular once  guaiFENesin Oral Liquid (Sugar-Free) 200 milliGRAM(s) Oral every 6 hours  hydrocortisone hemorrhoidal Suppository 1 Suppository(s) Rectal two times a day  influenza  Vaccine (HIGH DOSE) 0.7 milliLiter(s) IntraMuscular once  insulin lispro (ADMELOG) corrective regimen sliding scale   SubCutaneous three times a day before meals  insulin lispro (ADMELOG) corrective regimen sliding scale   SubCutaneous at bedtime  lactobacillus acidophilus 1 Tablet(s) Oral at bedtime  levothyroxine 88 MICROGram(s) Oral daily  metoprolol succinate ER 25 milliGRAM(s) Oral daily  montelukast 10 milliGRAM(s) Oral daily  pantoprazole    Tablet 40 milliGRAM(s) Oral before breakfast  tiotropium 18 MICROgram(s) Capsule 1 Capsule(s) Inhalation daily  zinc oxide 20% Ointment 1 Application(s) Topical two times a day    MEDICATIONS  (PRN):  acetaminophen     Tablet .. 650 milliGRAM(s) Oral every 6 hours PRN Temp greater or equal to 38C (100.4F), Mild Pain (1 - 3)  aluminum hydroxide/magnesium hydroxide/simethicone Suspension 30 milliLiter(s) Oral every 4 hours PRN Dyspepsia  artificial  tears Solution 1 Drop(s) Both EYES three times a day PRN Dry Eyes  dextrose Oral Gel 15 Gram(s) Oral once PRN Blood Glucose LESS THAN 70 milliGRAM(s)/deciliter  guaiFENesin Oral Liquid (Sugar-Free) 200 milliGRAM(s) Oral every 6 hours PRN Cough    Vital Signs Last 24 Hrs  T(C): 36.8 (17 Nov 2022 11:43), Max: 36.8 (17 Nov 2022 11:43)  T(F): 98.2 (17 Nov 2022 11:43), Max: 98.2 (17 Nov 2022 11:43)  HR: 68 (17 Nov 2022 11:43) (66 - 82)  BP: 150/72 (17 Nov 2022 11:43) (134/73 - 150/72)  BP(mean): --  RR: 18 (17 Nov 2022 11:43) (18 - 18)  SpO2: 95% (17 Nov 2022 11:43) (95% - 98%)    Parameters below as of 17 Nov 2022 11:43  Patient On (Oxygen Delivery Method): room air        I&O's Summary    16 Nov 2022 07:01  -  17 Nov 2022 07:00  --------------------------------------------------------  IN: 240 mL / OUT: 0 mL / NET: 240 mL          Physical Exam:   GENERAL: NAD, well-groomed, well-developed  HEENT: LEW/   Atraumatic, Normocephalic  ENMT: No tonsillar erythema, exudates, or enlargement; Moist mucous membranes, Good dentition, No lesions  NECK: Supple, No JVD, Normal thyroid  CHEST/LUNG: mild coarse rhonchi +  CVS: Regular rate and rhythm; No murmurs, rubs, or gallops  GI: : Soft, Nontender, Nondistended; Bowel sounds present  NERVOUS SYSTEM:  Alert & Oriented X3  EXTREMITIES: + edema  LYMPH: No lymphadenopathy noted  SKIN: No rashes or lesions  ENDOCRINOLOGY: No Thyromegaly  PSYCH: Appropriate    Labs:  33                            11.8   14.15 )-----------( 317      ( 17 Nov 2022 05:27 )             36.8                         12.5   15.47 )-----------( 292      ( 16 Nov 2022 05:50 )             39.5                         12.7   18.58 )-----------( 332      ( 15 Nov 2022 06:20 )             40.2                         13.0   21.85 )-----------( 358      ( 14 Nov 2022 23:16 )             41.1                         13.2   28.23 )-----------( 345      ( 14 Nov 2022 06:04 )             42.9     11-17    141  |  105  |  18  ----------------------------<  132<H>  3.7   |  30  |  0.68  11-16    135  |  100  |  22  ----------------------------<  203<H>  4.9   |  22  |  0.77  11-15    136  |  99  |  18  ----------------------------<  185<H>  4.4   |  25  |  0.65  11-14    133<L>  |  96<L>  |  20  ----------------------------<  215<H>  4.2   |  25  |  0.71  11-14    136  |  99  |  23  ----------------------------<  155<H>  4.0   |  26  |  0.77    Ca    8.2<L>      17 Nov 2022 05:27  Ca    8.3<L>      16 Nov 2022 05:50  Phos  2.7     11-17  Phos  3.4     11-16  Mg     2.10     11-17  Mg     2.40     11-16      CAPILLARY BLOOD GLUCOSE      POCT Blood Glucose.: 125 mg/dL (17 Nov 2022 12:08)  POCT Blood Glucose.: 93 mg/dL (17 Nov 2022 08:20)  POCT Blood Glucose.: 167 mg/dL (16 Nov 2022 21:26)  POCT Blood Glucose.: 197 mg/dL (16 Nov 2022 17:06)      rad< from: CT Chest w/ IV Cont (11.11.22 @ 23:15) >  CHEST:  LUNGS AND LARGE AIRWAYS: Patent central airways. Peripheral clustered   nodular opacities in the right upper lobe and lingula. Tubular left upper   lobe opacity, likely an impacted airway (2:31). Few scattered bilateral   subpleural nodules measuring up to 3 mm, likely intrapulmonary lymph   nodes. Right apical scarring/atelectasis.  PLEURA: Trace right pleural effusion. Left posterior fat-containing   Bochdalek hernia.  VESSELS: Atherosclerotic changes of the aorta and coronary arteries.  HEART: Heart size is normal. No pericardial effusion.  MEDIASTINUM AND ASHA: No lymphadenopathy.  CHEST WALL AND LOWER NECK: Within normal limits.    ABDOMEN AND PELVIS:  LIVER: Within normal limits.  BILE DUCTS: Nonspecific CBD dilatation and mild intrahepatic ductal   dilatation in the setting of prior cholecystectomy.  GALLBLADDER: Cholecystectomy.  SPLEEN: Within normal limits.  PANCREAS: Within normal limits.  ADRENALS: Within normal limits.  KIDNEYS/URETERS: No hydronephrosis. Subcentimeter hypodensities   bilaterally, too small to characterize.    BLADDER: Within normal limits.  REPRODUCTIVE ORGANS: Uterus and adnexa within normal limits.    BOWEL: No bowel obstruction. Portions of the colon are degraded by motion   artifact. Appendix is normal.  PERITONEUM: No ascites.  VESSELS: Atherosclerotic changes.  RETROPERITONEUM/LYMPH NODES: Nolymphadenopathy.  ABDOMINAL WALL: Small fat-containing right inguinal hernia.  BONES: Degenerative changes.    IMPRESSION: Motion degraded exam.  Clustered nodular opacities in the right upper lobe and lingula may be   infectious/inflammatory in nature.    Trace right pleural effusion.    No acute abdominal pathology.    --- End of Report ---      < end of copied text >              RECENT CULTURES:  11-11 @ 23:50 Clean Catch Clean Catch (Midstream)                <10,000 CFU/mL Normal Urogenital Barbara    11-11 @ 20:55 .Blood Blood-Peripheral                No Growth Final    11-11 @ 20:30 .Blood Blood-Peripheral                No Growth Final          RESPIRATORY CULTURES:          Studies  Chest X-RAY  CT SCAN Chest   Venous Dopplers: LE:   CT Abdomen  Others

## 2022-11-17 NOTE — PROGRESS NOTE ADULT - SUBJECTIVE AND OBJECTIVE BOX
S/  Pt SOB and cough improving  Pleuritic chest pain improved    MEDICATIONS  (STANDING):  albuterol/ipratropium for Nebulization 3 milliLiter(s) Nebulizer every 6 hours  amLODIPine   Tablet 2.5 milliGRAM(s) Oral daily  atorvastatin 20 milliGRAM(s) Oral at bedtime  benzonatate 100 milliGRAM(s) Oral every 8 hours  budesonide 160 MICROgram(s)/formoterol 4.5 MICROgram(s) Inhaler 2 Puff(s) Inhalation two times a day  cefTRIAXone   IVPB 1000 milliGRAM(s) IV Intermittent every 24 hours  dextrose 5%. 1000 milliLiter(s) (50 mL/Hr) IV Continuous <Continuous>  dextrose 5%. 1000 milliLiter(s) (100 mL/Hr) IV Continuous <Continuous>  dextrose 50% Injectable 25 Gram(s) IV Push once  dextrose 50% Injectable 12.5 Gram(s) IV Push once  dextrose 50% Injectable 25 Gram(s) IV Push once  enoxaparin Injectable 40 milliGRAM(s) SubCutaneous every 24 hours  glucagon  Injectable 1 milliGRAM(s) IntraMuscular once  guaiFENesin Oral Liquid (Sugar-Free) 200 milliGRAM(s) Oral every 6 hours  hydrocortisone hemorrhoidal Suppository 1 Suppository(s) Rectal two times a day  influenza  Vaccine (HIGH DOSE) 0.7 milliLiter(s) IntraMuscular once  insulin lispro (ADMELOG) corrective regimen sliding scale   SubCutaneous three times a day before meals  insulin lispro (ADMELOG) corrective regimen sliding scale   SubCutaneous at bedtime  lactobacillus acidophilus 1 Tablet(s) Oral at bedtime  levothyroxine 88 MICROGram(s) Oral daily  metoprolol succinate ER 25 milliGRAM(s) Oral daily  montelukast 10 milliGRAM(s) Oral daily  pantoprazole    Tablet 40 milliGRAM(s) Oral before breakfast  tiotropium 18 MICROgram(s) Capsule 1 Capsule(s) Inhalation daily  zinc oxide 20% Ointment 1 Application(s) Topical two times a day    MEDICATIONS  (PRN):  acetaminophen     Tablet .. 650 milliGRAM(s) Oral every 6 hours PRN Temp greater or equal to 38C (100.4F), Mild Pain (1 - 3)  aluminum hydroxide/magnesium hydroxide/simethicone Suspension 30 milliLiter(s) Oral every 4 hours PRN Dyspepsia  artificial  tears Solution 1 Drop(s) Both EYES three times a day PRN Dry Eyes  dextrose Oral Gel 15 Gram(s) Oral once PRN Blood Glucose LESS THAN 70 milliGRAM(s)/deciliter  guaiFENesin Oral Liquid (Sugar-Free) 200 milliGRAM(s) Oral every 6 hours PRN Cough    Vital Signs Last 24 Hrs  T(C): 36.8 (17 Nov 2022 11:43), Max: 36.8 (17 Nov 2022 11:43)  T(F): 98.2 (17 Nov 2022 11:43), Max: 98.2 (17 Nov 2022 11:43)  HR: 68 (17 Nov 2022 11:43) (66 - 82)  BP: 150/72 (17 Nov 2022 11:43) (134/73 - 150/72)  BP(mean): --  RR: 18 (17 Nov 2022 11:43) (18 - 18)  SpO2: 95% (17 Nov 2022 11:43) (95% - 98%)    Parameters below as of 17 Nov 2022 11:43  Patient On (Oxygen Delivery Method): room air        I&O's Summary    16 Nov 2022 07:01  -  17 Nov 2022 07:00  --------------------------------------------------------  IN: 240 mL / OUT: 0 mL / NET: 240 mL      O/    Physical Exam:   GENERAL: NAD, well-groomed, well-developed  HEENT: LEW/   Atraumatic, Normocephalic  ENMT: No tonsillar erythema, exudates, or enlargement; Moist mucous membranes, Good dentition, No lesions  NECK: Supple, No JVD, Normal thyroid  CHEST/LUNG: mild coarse rhonchi +; no rales or wheezing  CVS: Regular rate and rhythm; No murmurs, rubs, or gallops  GI: : Soft, Nontender, Nondistended; Bowel sounds present  NERVOUS SYSTEM:  Alert & Oriented X3  EXTREMITIES: + edema  LYMPH: No lymphadenopathy noted  SKIN: No rashes or lesions  ENDOCRINOLOGY: No Thyromegaly  PSYCH: Appropriate    Labs:  33                            11.8   14.15 )-----------( 317      ( 17 Nov 2022 05:27 )             36.8                         12.5   15.47 )-----------( 292      ( 16 Nov 2022 05:50 )             39.5                         12.7   18.58 )-----------( 332      ( 15 Nov 2022 06:20 )             40.2                         13.0   21.85 )-----------( 358      ( 14 Nov 2022 23:16 )             41.1                         13.2   28.23 )-----------( 345      ( 14 Nov 2022 06:04 )             42.9     11-17    141  |  105  |  18  ----------------------------<  132<H>  3.7   |  30  |  0.68  11-16    135  |  100  |  22  ----------------------------<  203<H>  4.9   |  22  |  0.77  11-15    136  |  99  |  18  ----------------------------<  185<H>  4.4   |  25  |  0.65  11-14    133<L>  |  96<L>  |  20  ----------------------------<  215<H>  4.2   |  25  |  0.71  11-14    136  |  99  |  23  ----------------------------<  155<H>  4.0   |  26  |  0.77    Ca    8.2<L>      17 Nov 2022 05:27  Ca    8.3<L>      16 Nov 2022 05:50  Phos  2.7     11-17  Phos  3.4     11-16  Mg     2.10     11-17  Mg     2.40     11-16

## 2022-11-17 NOTE — PROGRESS NOTE ADULT - SUBJECTIVE AND OBJECTIVE BOX
[place]  87y  Female      Patient is a 87y old  Female who presents with a chief complaint of SOB and cough (17 Nov 2022 18:21)  feels better,no sob,no cp,no abd pain    REVIEW OF SYSTEMS:  CONSTITUTIONAL: No fever  RESPIRATORY: No cough, hemoptysis or shortness of breath  CARDIOVASCULAR: No chest pain, palpitations, dizziness, or leg swelling  GASTROINTESTINAL: No abdominal pain. nausea, vomiting, hematemesis  GENITOURINARY: No dysuria, frequency, hematuria   NEUROLOGICAL: No headaches, no dizziness  MUSCULOSKELETAL: No joint pain or swelling;     INTERVAL HPI/OVERNIGHT EVENTS:  T(C): 36.8 (11-17-22 @ 11:43), Max: 36.8 (11-17-22 @ 11:43)  HR: 95 (11-17-22 @ 18:35) (66 - 95)  BP: 142/85 (11-17-22 @ 18:35) (134/73 - 150/72)  RR: 18 (11-17-22 @ 11:43) (18 - 18)  SpO2: 95% (11-17-22 @ 11:43) (95% - 95%)  Wt(kg): --  I&O's Summary    16 Nov 2022 07:01  -  17 Nov 2022 07:00  --------------------------------------------------------  IN: 240 mL / OUT: 0 mL / NET: 240 mL      T(C): 36.8 (11-17-22 @ 11:43), Max: 36.8 (11-17-22 @ 11:43)  HR: 95 (11-17-22 @ 18:35) (66 - 95)  BP: 142/85 (11-17-22 @ 18:35) (134/73 - 150/72)  RR: 18 (11-17-22 @ 11:43) (18 - 18)  SpO2: 95% (11-17-22 @ 11:43) (95% - 95%)  Wt(kg): --Vital Signs Last 24 Hrs  T(C): 36.8 (17 Nov 2022 11:43), Max: 36.8 (17 Nov 2022 11:43)  T(F): 98.2 (17 Nov 2022 11:43), Max: 98.2 (17 Nov 2022 11:43)  HR: 95 (17 Nov 2022 18:35) (66 - 95)  BP: 142/85 (17 Nov 2022 18:35) (134/73 - 150/72)  BP(mean): --  RR: 18 (17 Nov 2022 11:43) (18 - 18)  SpO2: 95% (17 Nov 2022 11:43) (95% - 95%)    Parameters below as of 17 Nov 2022 11:43  Patient On (Oxygen Delivery Method): room air        LABS:                        11.8   14.15 )-----------( 317      ( 17 Nov 2022 05:27 )             36.8     11-17    141  |  105  |  18  ----------------------------<  132<H>  3.7   |  30  |  0.68    Ca    8.2<L>      17 Nov 2022 05:27  Phos  2.7     11-17  Mg     2.10     11-17          CAPILLARY BLOOD GLUCOSE      POCT Blood Glucose.: 87 mg/dL (17 Nov 2022 17:16)  POCT Blood Glucose.: 125 mg/dL (17 Nov 2022 12:08)  POCT Blood Glucose.: 93 mg/dL (17 Nov 2022 08:20)  POCT Blood Glucose.: 167 mg/dL (16 Nov 2022 21:26)            PAST MEDICAL & SURGICAL HISTORY:  Cardiac arrest      HTN (hypertension)      COPD exacerbation      CAD (coronary artery disease)      Hypothyroid          MEDICATIONS  (STANDING):  amLODIPine   Tablet 2.5 milliGRAM(s) Oral daily  atorvastatin 20 milliGRAM(s) Oral at bedtime  benzonatate 100 milliGRAM(s) Oral every 8 hours  budesonide 160 MICROgram(s)/formoterol 4.5 MICROgram(s) Inhaler 2 Puff(s) Inhalation two times a day  cefTRIAXone   IVPB 1000 milliGRAM(s) IV Intermittent every 24 hours  dextrose 5%. 1000 milliLiter(s) (50 mL/Hr) IV Continuous <Continuous>  dextrose 5%. 1000 milliLiter(s) (100 mL/Hr) IV Continuous <Continuous>  dextrose 50% Injectable 25 Gram(s) IV Push once  dextrose 50% Injectable 12.5 Gram(s) IV Push once  dextrose 50% Injectable 25 Gram(s) IV Push once  enoxaparin Injectable 40 milliGRAM(s) SubCutaneous every 24 hours  glucagon  Injectable 1 milliGRAM(s) IntraMuscular once  guaiFENesin Oral Liquid (Sugar-Free) 200 milliGRAM(s) Oral every 6 hours  hydrocortisone hemorrhoidal Suppository 1 Suppository(s) Rectal two times a day  influenza  Vaccine (HIGH DOSE) 0.7 milliLiter(s) IntraMuscular once  insulin lispro (ADMELOG) corrective regimen sliding scale   SubCutaneous three times a day before meals  insulin lispro (ADMELOG) corrective regimen sliding scale   SubCutaneous at bedtime  lactobacillus acidophilus 1 Tablet(s) Oral at bedtime  levothyroxine 88 MICROGram(s) Oral daily  losartan 25 milliGRAM(s) Oral daily  metoprolol succinate ER 50 milliGRAM(s) Oral daily  montelukast 10 milliGRAM(s) Oral daily  pantoprazole    Tablet 40 milliGRAM(s) Oral before breakfast  tiotropium 18 MICROgram(s) Capsule 1 Capsule(s) Inhalation daily  zinc oxide 20% Ointment 1 Application(s) Topical two times a day    MEDICATIONS  (PRN):  acetaminophen     Tablet .. 650 milliGRAM(s) Oral every 6 hours PRN Temp greater or equal to 38C (100.4F), Mild Pain (1 - 3)  albuterol/ipratropium for Nebulization 3 milliLiter(s) Nebulizer every 6 hours PRN Shortness of Breath and/or Wheezing  aluminum hydroxide/magnesium hydroxide/simethicone Suspension 30 milliLiter(s) Oral every 4 hours PRN Dyspepsia  artificial  tears Solution 1 Drop(s) Both EYES three times a day PRN Dry Eyes  dextrose Oral Gel 15 Gram(s) Oral once PRN Blood Glucose LESS THAN 70 milliGRAM(s)/deciliter  guaiFENesin Oral Liquid (Sugar-Free) 200 milliGRAM(s) Oral every 6 hours PRN Cough        RADIOLOGY & ADDITIONAL TESTS:    Imaging Personally Reviewed:  [ ] YES  [ ] NO    Consultant(s) Notes Reviewed:  [ ] YES  [ ] NO    PHYSICAL EXAM:  GENERAL: Alert and awake lying in bed in no distress  HEAD:  Atraumatic, Normocephalic  EYES: EOMI, LEW, conjunctiva and sclera clear  NECK: Supple, No JVD, Normal thyroid  NERVOUS SYSTEM:  Alert & Oriented X3, Motor and sensory systems are intact,   CHEST/LUNG: minimal exp,wheezing,  HEART: Regular rate and rhythm; No murmurs, rubs, or gallops  ABDOMEN: Soft, Nontender, Nondistended; Bowel sounds present  EXTREMITIES:   Peripheral Pulses are palpable, no  edema        Care Discussed with Consultants/Other Providers [x ] YES  [ ] NO      Code Status: [] Full Code [] DNR [] DNI [] Goals of Care:   Disposition: [] ICU [] Stroke Unit [] RCU []PCU []Floor [] Discharge Home         ALDO [email or web address]

## 2022-11-18 LAB
ANION GAP SERPL CALC-SCNC: 7 MMOL/L — SIGNIFICANT CHANGE UP (ref 7–14)
BUN SERPL-MCNC: 15 MG/DL — SIGNIFICANT CHANGE UP (ref 7–23)
CALCIUM SERPL-MCNC: 8.1 MG/DL — LOW (ref 8.4–10.5)
CHLORIDE SERPL-SCNC: 99 MMOL/L — SIGNIFICANT CHANGE UP (ref 98–107)
CO2 SERPL-SCNC: 33 MMOL/L — HIGH (ref 22–31)
CREAT SERPL-MCNC: 0.76 MG/DL — SIGNIFICANT CHANGE UP (ref 0.5–1.3)
EGFR: 76 ML/MIN/1.73M2 — SIGNIFICANT CHANGE UP
GLUCOSE SERPL-MCNC: 102 MG/DL — HIGH (ref 70–99)
HCT VFR BLD CALC: 43.4 % — SIGNIFICANT CHANGE UP (ref 34.5–45)
HGB BLD-MCNC: 13.6 G/DL — SIGNIFICANT CHANGE UP (ref 11.5–15.5)
MAGNESIUM SERPL-MCNC: 2 MG/DL — SIGNIFICANT CHANGE UP (ref 1.6–2.6)
MCHC RBC-ENTMCNC: 27.4 PG — SIGNIFICANT CHANGE UP (ref 27–34)
MCHC RBC-ENTMCNC: 31.3 GM/DL — LOW (ref 32–36)
MCV RBC AUTO: 87.3 FL — SIGNIFICANT CHANGE UP (ref 80–100)
NRBC # BLD: 0 /100 WBCS — SIGNIFICANT CHANGE UP (ref 0–0)
NRBC # FLD: 0 K/UL — SIGNIFICANT CHANGE UP (ref 0–0)
PHOSPHATE SERPL-MCNC: 3.7 MG/DL — SIGNIFICANT CHANGE UP (ref 2.5–4.5)
PLATELET # BLD AUTO: 364 K/UL — SIGNIFICANT CHANGE UP (ref 150–400)
POTASSIUM SERPL-MCNC: 3.8 MMOL/L — SIGNIFICANT CHANGE UP (ref 3.5–5.3)
POTASSIUM SERPL-SCNC: 3.8 MMOL/L — SIGNIFICANT CHANGE UP (ref 3.5–5.3)
RBC # BLD: 4.97 M/UL — SIGNIFICANT CHANGE UP (ref 3.8–5.2)
RBC # FLD: 14.6 % — HIGH (ref 10.3–14.5)
SODIUM SERPL-SCNC: 139 MMOL/L — SIGNIFICANT CHANGE UP (ref 135–145)
WBC # BLD: 11.64 K/UL — HIGH (ref 3.8–10.5)
WBC # FLD AUTO: 11.64 K/UL — HIGH (ref 3.8–10.5)

## 2022-11-18 RX ADMIN — MONTELUKAST 10 MILLIGRAM(S): 4 TABLET, CHEWABLE ORAL at 12:34

## 2022-11-18 RX ADMIN — Medication 200 MILLIGRAM(S): at 22:18

## 2022-11-18 RX ADMIN — ENOXAPARIN SODIUM 40 MILLIGRAM(S): 100 INJECTION SUBCUTANEOUS at 05:53

## 2022-11-18 RX ADMIN — CEFTRIAXONE 100 MILLIGRAM(S): 500 INJECTION, POWDER, FOR SOLUTION INTRAMUSCULAR; INTRAVENOUS at 13:59

## 2022-11-18 RX ADMIN — Medication 1 TABLET(S): at 22:18

## 2022-11-18 RX ADMIN — Medication 100 MILLIGRAM(S): at 14:01

## 2022-11-18 RX ADMIN — Medication 88 MICROGRAM(S): at 05:53

## 2022-11-18 RX ADMIN — PANTOPRAZOLE SODIUM 40 MILLIGRAM(S): 20 TABLET, DELAYED RELEASE ORAL at 05:54

## 2022-11-18 RX ADMIN — Medication 100 MILLIGRAM(S): at 05:54

## 2022-11-18 RX ADMIN — AMLODIPINE BESYLATE 2.5 MILLIGRAM(S): 2.5 TABLET ORAL at 05:54

## 2022-11-18 RX ADMIN — Medication 1 SUPPOSITORY(S): at 05:53

## 2022-11-18 RX ADMIN — ZINC OXIDE 1 APPLICATION(S): 200 OINTMENT TOPICAL at 05:58

## 2022-11-18 RX ADMIN — Medication 200 MILLIGRAM(S): at 05:53

## 2022-11-18 RX ADMIN — BUDESONIDE AND FORMOTEROL FUMARATE DIHYDRATE 2 PUFF(S): 160; 4.5 AEROSOL RESPIRATORY (INHALATION) at 22:20

## 2022-11-18 RX ADMIN — BUDESONIDE AND FORMOTEROL FUMARATE DIHYDRATE 2 PUFF(S): 160; 4.5 AEROSOL RESPIRATORY (INHALATION) at 09:11

## 2022-11-18 RX ADMIN — ATORVASTATIN CALCIUM 20 MILLIGRAM(S): 80 TABLET, FILM COATED ORAL at 22:18

## 2022-11-18 RX ADMIN — Medication 1: at 12:35

## 2022-11-18 RX ADMIN — Medication 100 MILLIGRAM(S): at 22:19

## 2022-11-18 RX ADMIN — Medication 200 MILLIGRAM(S): at 17:30

## 2022-11-18 RX ADMIN — Medication 1 SUPPOSITORY(S): at 17:30

## 2022-11-18 RX ADMIN — ZINC OXIDE 1 APPLICATION(S): 200 OINTMENT TOPICAL at 17:30

## 2022-11-18 RX ADMIN — Medication 200 MILLIGRAM(S): at 12:34

## 2022-11-18 RX ADMIN — Medication 50 MILLIGRAM(S): at 05:54

## 2022-11-18 NOTE — PHYSICAL THERAPY INITIAL EVALUATION ADULT - ADDITIONAL COMMENTS
Pt lives in an apartment, +4 steps to enter, was independent with all ADLs and functional mobility using a Single Axis Cane prior to admission.     Pt left semi-supine in bed, all lines intact, all needs in reach, bed alarm set, in NAD. MALINDA fitzgerald.

## 2022-11-18 NOTE — PHYSICAL THERAPY INITIAL EVALUATION ADULT - GENERAL OBSERVATIONS, REHAB EVAL
Pt received semi-supine in bed, +telemetry, all lines intact, in NAD. Pt agreeable to participate in PT evaluation.

## 2022-11-18 NOTE — PROGRESS NOTE ADULT - SUBJECTIVE AND OBJECTIVE BOX
Date of Service: 11-18-22 @ 14:56    Patient is a 87y old  Female who presents with a chief complaint of SOB and cough (17 Nov 2022 18:21)      Any change in ROS: seems OK :no sob: no cough :       MEDICATIONS  (STANDING):  amLODIPine   Tablet 2.5 milliGRAM(s) Oral daily  atorvastatin 20 milliGRAM(s) Oral at bedtime  benzonatate 100 milliGRAM(s) Oral every 8 hours  budesonide 160 MICROgram(s)/formoterol 4.5 MICROgram(s) Inhaler 2 Puff(s) Inhalation two times a day  dextrose 5%. 1000 milliLiter(s) (50 mL/Hr) IV Continuous <Continuous>  dextrose 5%. 1000 milliLiter(s) (100 mL/Hr) IV Continuous <Continuous>  dextrose 50% Injectable 25 Gram(s) IV Push once  dextrose 50% Injectable 12.5 Gram(s) IV Push once  dextrose 50% Injectable 25 Gram(s) IV Push once  enoxaparin Injectable 40 milliGRAM(s) SubCutaneous every 24 hours  glucagon  Injectable 1 milliGRAM(s) IntraMuscular once  guaiFENesin Oral Liquid (Sugar-Free) 200 milliGRAM(s) Oral every 6 hours  hydrocortisone hemorrhoidal Suppository 1 Suppository(s) Rectal two times a day  influenza  Vaccine (HIGH DOSE) 0.7 milliLiter(s) IntraMuscular once  insulin lispro (ADMELOG) corrective regimen sliding scale   SubCutaneous three times a day before meals  insulin lispro (ADMELOG) corrective regimen sliding scale   SubCutaneous at bedtime  lactobacillus acidophilus 1 Tablet(s) Oral at bedtime  levothyroxine 88 MICROGram(s) Oral daily  losartan 25 milliGRAM(s) Oral daily  metoprolol succinate ER 50 milliGRAM(s) Oral daily  montelukast 10 milliGRAM(s) Oral daily  pantoprazole    Tablet 40 milliGRAM(s) Oral before breakfast  tiotropium 18 MICROgram(s) Capsule 1 Capsule(s) Inhalation daily  zinc oxide 20% Ointment 1 Application(s) Topical two times a day    MEDICATIONS  (PRN):  acetaminophen     Tablet .. 650 milliGRAM(s) Oral every 6 hours PRN Temp greater or equal to 38C (100.4F), Mild Pain (1 - 3)  albuterol/ipratropium for Nebulization 3 milliLiter(s) Nebulizer every 6 hours PRN Shortness of Breath and/or Wheezing  aluminum hydroxide/magnesium hydroxide/simethicone Suspension 30 milliLiter(s) Oral every 4 hours PRN Dyspepsia  artificial  tears Solution 1 Drop(s) Both EYES three times a day PRN Dry Eyes  dextrose Oral Gel 15 Gram(s) Oral once PRN Blood Glucose LESS THAN 70 milliGRAM(s)/deciliter  guaiFENesin Oral Liquid (Sugar-Free) 200 milliGRAM(s) Oral every 6 hours PRN Cough    Vital Signs Last 24 Hrs  T(C): 36.7 (18 Nov 2022 10:18), Max: 36.7 (18 Nov 2022 05:50)  T(F): 98 (18 Nov 2022 10:18), Max: 98.1 (18 Nov 2022 05:50)  HR: 81 (18 Nov 2022 10:18) (81 - 95)  BP: 124/63 (18 Nov 2022 10:18) (124/63 - 142/85)  BP(mean): --  RR: 16 (18 Nov 2022 10:18) (16 - 18)  SpO2: 96% (18 Nov 2022 10:18) (95% - 96%)    Parameters below as of 18 Nov 2022 07:36  Patient On (Oxygen Delivery Method): room air        I&O's Summary        Physical Exam:   GENERAL: NAD, well-groomed, well-developed  HEENT: LEW/   Atraumatic, Normocephalic  ENMT: No tonsillar erythema, exudates, or enlargement; Moist mucous membranes, Good dentition, No lesions  NECK: Supple, No JVD, Normal thyroid  CHEST/LUNG: Clear to auscultaion  CVS: Regular rate and rhythm; No murmurs, rubs, or gallops  GI: : Soft, Nontender, Nondistended; Bowel sounds present  NERVOUS SYSTEM:  Alert & Oriented X3  EXTREMITIES: -edema  LYMPH: No lymphadenopathy noted  SKIN: No rashes or lesions  ENDOCRINOLOGY: No Thyromegaly  PSYCH: Appropriate    Labs:  33                            13.6   11.64 )-----------( 364      ( 18 Nov 2022 05:24 )             43.4                         11.8   14.15 )-----------( 317      ( 17 Nov 2022 05:27 )             36.8                         12.5   15.47 )-----------( 292      ( 16 Nov 2022 05:50 )             39.5                         12.7   18.58 )-----------( 332      ( 15 Nov 2022 06:20 )             40.2                         13.0   21.85 )-----------( 358      ( 14 Nov 2022 23:16 )             41.1     11-18    139  |  99  |  15  ----------------------------<  102<H>  3.8   |  33<H>  |  0.76  11-17    141  |  105  |  18  ----------------------------<  132<H>  3.7   |  30  |  0.68  11-16    135  |  100  |  22  ----------------------------<  203<H>  4.9   |  22  |  0.77  11-15    136  |  99  |  18  ----------------------------<  185<H>  4.4   |  25  |  0.65  11-14    133<L>  |  96<L>  |  20  ----------------------------<  215<H>  4.2   |  25  |  0.71    Ca    8.1<L>      18 Nov 2022 05:24  Ca    8.2<L>      17 Nov 2022 05:27  Phos  3.7     11-18  Phos  2.7     11-17  Mg     2.00     11-18  Mg     2.10     11-17      CAPILLARY BLOOD GLUCOSE      POCT Blood Glucose.: 152 mg/dL (18 Nov 2022 12:19)  POCT Blood Glucose.: 93 mg/dL (18 Nov 2022 08:18)  POCT Blood Glucose.: 108 mg/dL (17 Nov 2022 22:41)  POCT Blood Glucose.: 87 mg/dL (17 Nov 2022 17:16)          rad< from: CT Chest w/ IV Cont (11.11.22 @ 23:15) >  BILE DUCTS: Nonspecific CBD dilatation and mild intrahepatic ductal   dilatation in the setting of prior cholecystectomy.  GALLBLADDER: Cholecystectomy.  SPLEEN: Within normal limits.  PANCREAS: Within normal limits.  ADRENALS: Within normal limits.  KIDNEYS/URETERS: No hydronephrosis. Subcentimeter hypodensities   bilaterally, too small to characterize.    BLADDER: Within normal limits.  REPRODUCTIVE ORGANS: Uterus and adnexa within normal limits.    BOWEL: No bowel obstruction. Portions of the colon are degraded by motion   artifact. Appendix is normal.  PERITONEUM: No ascites.  VESSELS: Atherosclerotic changes.  RETROPERITONEUM/LYMPH NODES: Nolymphadenopathy.  ABDOMINAL WALL: Small fat-containing right inguinal hernia.  BONES: Degenerative changes.    IMPRESSION: Motion degraded exam.  Clustered nodular opacities in the right upper lobe and lingula may be   infectious/inflammatory in nature.    Trace right pleural effusion.    No acute abdominal pathology.    --- End of Report ---          DIYA TREVIZO MD; Resident Radiologist  This document has been electronically signed.  ISABEL MAGANA MD; Attending Radiologist  This document has been electronically signed. Nov 12 2022 12:51AM    < end of copied text >          RECENT CULTURES:  11-11 @ 23:50 Clean Catch Clean Catch (Midstream)                <10,000 CFU/mL Normal Urogenital Barbara    11-11 @ 20:55 .Blood Blood-Peripheral                No Growth Final    11-11 @ 20:30 .Blood Blood-Peripheral                No Growth Final          RESPIRATORY CULTURES:          Studies  Chest X-RAY  CT SCAN Chest   Venous Dopplers: LE:   CT Abdomen  Others

## 2022-11-18 NOTE — PROGRESS NOTE ADULT - SUBJECTIVE AND OBJECTIVE BOX
[place]  87y  Female      Patient is a 87y old  Female who presents with a chief complaint of SOB and cough (18 Nov 2022 18:27)  comfortable,feels better,no sob,no cp    REVIEW OF SYSTEMS:  CONSTITUTIONAL: No fever  RESPIRATORY: No cough, hemoptysis or shortness of breath  CARDIOVASCULAR: No chest pain, palpitations, dizziness, or leg swelling  GASTROINTESTINAL: No abdominal pain. nausea, vomiting, hematemesis  GENITOURINARY: No dysuria, frequency, hematuria   NEUROLOGICAL: No headaches, no dizziness  MUSCULOSKELETAL: No joint pain or swelling;     INTERVAL HPI/OVERNIGHT EVENTS:  T(C): 36.7 (11-18-22 @ 10:18), Max: 36.7 (11-18-22 @ 05:50)  HR: 81 (11-18-22 @ 10:18) (81 - 90)  BP: 124/63 (11-18-22 @ 10:18) (124/63 - 133/71)  RR: 16 (11-18-22 @ 10:18) (16 - 18)  SpO2: 96% (11-18-22 @ 10:18) (95% - 96%)  Wt(kg): --  I&O's Summary    T(C): 36.7 (11-18-22 @ 10:18), Max: 36.7 (11-18-22 @ 05:50)  HR: 81 (11-18-22 @ 10:18) (81 - 90)  BP: 124/63 (11-18-22 @ 10:18) (124/63 - 133/71)  RR: 16 (11-18-22 @ 10:18) (16 - 18)  SpO2: 96% (11-18-22 @ 10:18) (95% - 96%)  Wt(kg): --Vital Signs Last 24 Hrs  T(C): 36.7 (18 Nov 2022 10:18), Max: 36.7 (18 Nov 2022 05:50)  T(F): 98 (18 Nov 2022 10:18), Max: 98.1 (18 Nov 2022 05:50)  HR: 81 (18 Nov 2022 10:18) (81 - 90)  BP: 124/63 (18 Nov 2022 10:18) (124/63 - 133/71)  BP(mean): --  RR: 16 (18 Nov 2022 10:18) (16 - 18)  SpO2: 96% (18 Nov 2022 10:18) (95% - 96%)    Parameters below as of 18 Nov 2022 07:36  Patient On (Oxygen Delivery Method): room air        LABS:                        13.6   11.64 )-----------( 364      ( 18 Nov 2022 05:24 )             43.4     11-18    139  |  99  |  15  ----------------------------<  102<H>  3.8   |  33<H>  |  0.76    Ca    8.1<L>      18 Nov 2022 05:24  Phos  3.7     11-18  Mg     2.00     11-18          CAPILLARY BLOOD GLUCOSE      POCT Blood Glucose.: 129 mg/dL (18 Nov 2022 17:24)  POCT Blood Glucose.: 152 mg/dL (18 Nov 2022 12:19)  POCT Blood Glucose.: 93 mg/dL (18 Nov 2022 08:18)  POCT Blood Glucose.: 108 mg/dL (17 Nov 2022 22:41)            PAST MEDICAL & SURGICAL HISTORY:  Cardiac arrest      HTN (hypertension)      COPD exacerbation      CAD (coronary artery disease)      Hypothyroid          MEDICATIONS  (STANDING):  amLODIPine   Tablet 2.5 milliGRAM(s) Oral daily  atorvastatin 20 milliGRAM(s) Oral at bedtime  benzonatate 100 milliGRAM(s) Oral every 8 hours  budesonide 160 MICROgram(s)/formoterol 4.5 MICROgram(s) Inhaler 2 Puff(s) Inhalation two times a day  dextrose 5%. 1000 milliLiter(s) (50 mL/Hr) IV Continuous <Continuous>  dextrose 5%. 1000 milliLiter(s) (100 mL/Hr) IV Continuous <Continuous>  dextrose 50% Injectable 25 Gram(s) IV Push once  dextrose 50% Injectable 12.5 Gram(s) IV Push once  dextrose 50% Injectable 25 Gram(s) IV Push once  enoxaparin Injectable 40 milliGRAM(s) SubCutaneous every 24 hours  glucagon  Injectable 1 milliGRAM(s) IntraMuscular once  guaiFENesin Oral Liquid (Sugar-Free) 200 milliGRAM(s) Oral every 6 hours  hydrocortisone hemorrhoidal Suppository 1 Suppository(s) Rectal two times a day  influenza  Vaccine (HIGH DOSE) 0.7 milliLiter(s) IntraMuscular once  insulin lispro (ADMELOG) corrective regimen sliding scale   SubCutaneous three times a day before meals  insulin lispro (ADMELOG) corrective regimen sliding scale   SubCutaneous at bedtime  lactobacillus acidophilus 1 Tablet(s) Oral at bedtime  levothyroxine 88 MICROGram(s) Oral daily  losartan 25 milliGRAM(s) Oral daily  metoprolol succinate ER 50 milliGRAM(s) Oral daily  montelukast 10 milliGRAM(s) Oral daily  pantoprazole    Tablet 40 milliGRAM(s) Oral before breakfast  tiotropium 18 MICROgram(s) Capsule 1 Capsule(s) Inhalation daily  zinc oxide 20% Ointment 1 Application(s) Topical two times a day    MEDICATIONS  (PRN):  acetaminophen     Tablet .. 650 milliGRAM(s) Oral every 6 hours PRN Temp greater or equal to 38C (100.4F), Mild Pain (1 - 3)  albuterol/ipratropium for Nebulization 3 milliLiter(s) Nebulizer every 6 hours PRN Shortness of Breath and/or Wheezing  aluminum hydroxide/magnesium hydroxide/simethicone Suspension 30 milliLiter(s) Oral every 4 hours PRN Dyspepsia  artificial  tears Solution 1 Drop(s) Both EYES three times a day PRN Dry Eyes  dextrose Oral Gel 15 Gram(s) Oral once PRN Blood Glucose LESS THAN 70 milliGRAM(s)/deciliter  guaiFENesin Oral Liquid (Sugar-Free) 200 milliGRAM(s) Oral every 6 hours PRN Cough        RADIOLOGY & ADDITIONAL TESTS:    Imaging Personally Reviewed:  [ ] YES  [ ] NO    Consultant(s) Notes Reviewed:  [x ] YES  [ ] NO    PHYSICAL EXAM:  GENERAL: Alert and awake lying in bed in no distress  HEAD:  Atraumatic, Normocephalic  EYES: EOMI, LEW, conjunctiva and sclera clear  NECK: Supple, No JVD, Normal thyroid  NERVOUS SYSTEM:  Alert & Oriented X3, Motor and sensory systems are intact,   CHEST/LUNG: Bilateral clear breath sounds, no rhochi, no wheezing, no crepitations,  HEART: Regular rate and rhythm; No murmurs, rubs, or gallops  ABDOMEN: Soft, Nontender, Nondistended; Bowel sounds present  EXTREMITIES:   Peripheral Pulses are palpable, no  edema        Care Discussed with Consultants/Other Providersx [ ] YES  [ ] NO      Code Status: [] Full Code [] DNR [] DNI [] Goals of Care:   Disposition: [] ICU [] Stroke Unit [] RCU []PCU []Floor [] Discharge Home         TAURUS CamposP

## 2022-11-18 NOTE — PHYSICAL THERAPY INITIAL EVALUATION ADULT - PERTINENT HX OF CURRENT PROBLEM, REHAB EVAL
87-year-old female with past medical history of coronary artery disease, stents, cardiac arrest, hyperlipidemia, hypertension, diabetes, asthma, hypothyroidism, cholecystectomy, presenting to the emergency department as directed by her cardiologist for complaint of shortness of breath

## 2022-11-18 NOTE — PROGRESS NOTE ADULT - ASSESSMENT
[facility]  INFECTIOUS DISEASES AND INTERNAL MEDICINE at [place]  =======================================================  Diaz Campos MD  Diplomates American Board of Internal Medicine and Infectious Diseases  =======================================================    KARLA TOTH [id number]    Follow up: Osteomyelitis     No fevers or chills  No reaction to Meropenem    Allergies:  No Known Allergies      Antibiotics:  meropenem  IVPB 1000 milliGRAM(s) IV Intermittent every 8 hours       REVIEW OF SYSTEMS:  CONSTITUTIONAL:  No Fever or chills  HEENT:   No diplopia or blurred vision.  No earache, sore throat or runny nose.  CARDIOVASCULAR:  No pressure, squeezing, strangling, tightness, heaviness or aching about the chest, neck, axilla or epigastrium.  RESPIRATORY:  No cough, shortness of breath  GASTROINTESTINAL:  No nausea, vomiting or diarrhea.  GENITOURINARY:  No dysuria, frequency or urgency  MUSCULOSKELETAL:  no joint aches, no muscle pain  SKIN:  No change in skin, hair or nails.  NEUROLOGIC:  No paresthesias, fasciculations  PSYCHIATRIC:  No disorder of thought or mood.  ENDOCRINE:  No heat or cold intolerance  HEMATOLOGICAL:  No easy bruising or bleeding.       Physical Exam:  Vital Signs Last 24 Hrs  T(C): 36.7 (03 Dec 2018 08:22), Max: 36.8 (02 Dec 2018 17:03)  T(F): 98 (03 Dec 2018 08:22), Max: 98.3 (02 Dec 2018 17:03)  HR: 79 (03 Dec 2018 08:22) (78 - 81)  BP: 153/83 (03 Dec 2018 08:22) (127/71 - 153/83)  RR: 18 (03 Dec 2018 08:22) (18 - 18)  SpO2: 98% (03 Dec 2018 08:22) (97% - 99%)      GEN: NAD, pleasant  HEENT: normocephalic and atraumatic. EOMI. PERRL.    NECK: Supple.   LUNGS: Clear to auscultation.  HEART: Regular rate and rhythm   ABDOMEN: Soft, nontender, and nondistended.  Positive bowel sounds.    : No CVA tenderness  EXTREMITIES: Without any edema.  MSK: no joint swelling  NEUROLOGIC: No focal deficits  PSYCHIATRIC: Appropriate affect .  SKIN: No Rash      Labs:             11.9   8.3   )-----------( 215      ( 02 Dec 2018 09:54 )             36.6       RECENT CULTURES:  11-30 @ 02:35 .Urine     <10,000 CFU/ml Gram Positive Rods      11-30 @ 01:49    RVP  NotDetec      11-29 @ 18:30 .Blood     No growth at 48 hours 87-year-old female with underlying history significant for coronary artery disease s/p PCI stents LAD/RCA,, cardiac arrest, hyperlipidemia, hypertension, diabetes, chronic diastolic CHF, GERD, asthma, hypothyroidism, cholecystectomy who presented to ED via EMS    Chest pain --pleuritic type due to PNA--resolved  Sepsis due to RUL PNA--resolved  RSV infection  Acute COPD exac--improved  Acute Hypoxic Resp Failure due to RUL PNA and RSV infection  Chronic Diastolic CHF  hx CAD s/p MI PCI stents to LAD and RCA  DM, HTN, HLD  Uncontrolled HTN  GERD    continue losartan 25mg daily for HTN  continue metoprolol succinate 50mg daily for HTN and tachycardia  IV antibiotics per Medical / ID team  PPI and probiotics  PT evaluation noted  discharge home tomorrow    ffup in my office in 2 weeks  [phone]   ffup PCP Dr. Ziegler outpt

## 2022-11-18 NOTE — PROGRESS NOTE ADULT - SUBJECTIVE AND OBJECTIVE BOX
S/  Pt SOB and cough improved  No chest pain    MEDICATIONS  (STANDING):  amLODIPine   Tablet 2.5 milliGRAM(s) Oral daily  atorvastatin 20 milliGRAM(s) Oral at bedtime  benzonatate 100 milliGRAM(s) Oral every 8 hours  budesonide 160 MICROgram(s)/formoterol 4.5 MICROgram(s) Inhaler 2 Puff(s) Inhalation two times a day  dextrose 5%. 1000 milliLiter(s) (50 mL/Hr) IV Continuous <Continuous>  dextrose 5%. 1000 milliLiter(s) (100 mL/Hr) IV Continuous <Continuous>  dextrose 50% Injectable 25 Gram(s) IV Push once  dextrose 50% Injectable 12.5 Gram(s) IV Push once  dextrose 50% Injectable 25 Gram(s) IV Push once  enoxaparin Injectable 40 milliGRAM(s) SubCutaneous every 24 hours  glucagon  Injectable 1 milliGRAM(s) IntraMuscular once  guaiFENesin Oral Liquid (Sugar-Free) 200 milliGRAM(s) Oral every 6 hours  hydrocortisone hemorrhoidal Suppository 1 Suppository(s) Rectal two times a day  influenza  Vaccine (HIGH DOSE) 0.7 milliLiter(s) IntraMuscular once  insulin lispro (ADMELOG) corrective regimen sliding scale   SubCutaneous three times a day before meals  insulin lispro (ADMELOG) corrective regimen sliding scale   SubCutaneous at bedtime  lactobacillus acidophilus 1 Tablet(s) Oral at bedtime  levothyroxine 88 MICROGram(s) Oral daily  losartan 25 milliGRAM(s) Oral daily  metoprolol succinate ER 50 milliGRAM(s) Oral daily  montelukast 10 milliGRAM(s) Oral daily  pantoprazole    Tablet 40 milliGRAM(s) Oral before breakfast  tiotropium 18 MICROgram(s) Capsule 1 Capsule(s) Inhalation daily  zinc oxide 20% Ointment 1 Application(s) Topical two times a day    MEDICATIONS  (PRN):  acetaminophen     Tablet .. 650 milliGRAM(s) Oral every 6 hours PRN Temp greater or equal to 38C (100.4F), Mild Pain (1 - 3)  albuterol/ipratropium for Nebulization 3 milliLiter(s) Nebulizer every 6 hours PRN Shortness of Breath and/or Wheezing  aluminum hydroxide/magnesium hydroxide/simethicone Suspension 30 milliLiter(s) Oral every 4 hours PRN Dyspepsia  artificial  tears Solution 1 Drop(s) Both EYES three times a day PRN Dry Eyes  dextrose Oral Gel 15 Gram(s) Oral once PRN Blood Glucose LESS THAN 70 milliGRAM(s)/deciliter  guaiFENesin Oral Liquid (Sugar-Free) 200 milliGRAM(s) Oral every 6 hours PRN Cough    O/    Vital Signs Last 24 Hrs  T(C): 36.7 (18 Nov 2022 10:18), Max: 36.7 (18 Nov 2022 05:50)  T(F): 98 (18 Nov 2022 10:18), Max: 98.1 (18 Nov 2022 05:50)  HR: 81 (18 Nov 2022 10:18) (81 - 95)  BP: 124/63 (18 Nov 2022 10:18) (124/63 - 142/85)  BP(mean): --  RR: 16 (18 Nov 2022 10:18) (16 - 18)  SpO2: 96% (18 Nov 2022 10:18) (95% - 96%)    Parameters below as of 18 Nov 2022 07:36  Patient On (Oxygen Delivery Method): room air        I&O's Summary        Physical Exam:   GENERAL: NAD, well-groomed, well-developed  HEENT: LEW/   Atraumatic, Normocephalic  ENMT: No tonsillar erythema, exudates, or enlargement; Moist mucous membranes, Good dentition, No lesions  NECK: Supple, No JVD, Normal thyroid  CHEST/LUNG: Clear to auscultation; no wheezing or rales  CVS: Regular rate and rhythm; No murmurs, rubs, or gallops  GI: : Soft, Nontender, Nondistended; Bowel sounds present  NERVOUS SYSTEM:  Alert & Oriented X3  EXTREMITIES: -edema  LYMPH: No lymphadenopathy noted  SKIN: No rashes or lesions  ENDOCRINOLOGY: No Thyromegaly  PSYCH: Appropriate    Labs:  33                            13.6   11.64 )-----------( 364      ( 18 Nov 2022 05:24 )             43.4                         11.8   14.15 )-----------( 317      ( 17 Nov 2022 05:27 )             36.8                         12.5   15.47 )-----------( 292      ( 16 Nov 2022 05:50 )             39.5                         12.7   18.58 )-----------( 332      ( 15 Nov 2022 06:20 )             40.2                         13.0   21.85 )-----------( 358      ( 14 Nov 2022 23:16 )             41.1     11-18    139  |  99  |  15  ----------------------------<  102<H>  3.8   |  33<H>  |  0.76  11-17    141  |  105  |  18  ----------------------------<  132<H>  3.7   |  30  |  0.68  11-16    135  |  100  |  22  ----------------------------<  203<H>  4.9   |  22  |  0.77  11-15    136  |  99  |  18  ----------------------------<  185<H>  4.4   |  25  |  0.65  11-14    133<L>  |  96<L>  |  20  ----------------------------<  215<H>  4.2   |  25  |  0.71    Ca    8.1<L>      18 Nov 2022 05:24  Ca    8.2<L>      17 Nov 2022 05:27  Phos  3.7     11-18  Phos  2.7     11-17  Mg     2.00     11-18  Mg     2.10     11-17

## 2022-11-18 NOTE — PHYSICAL THERAPY INITIAL EVALUATION ADULT - NSPTDISCHREC_GEN_A_CORE
Anticipating discharge to home with no skilled PT needs at this time; pt appears to be functioning at or near her baseline/No skilled PT needs

## 2022-11-18 NOTE — PROGRESS NOTE ADULT - ASSESSMENT
This is a 87-year-old female with past medical history of coronary artery disease, stents, cardiac arrest, hyperlipidemia, hypertension, diabetes, asthma, hypothyroidism, cholecystectomy, presenting to the emergency department as directed by her cardiologist for complaint of shortness of breath, chest pain, epigastric pain, and cough increasing over the course of the past 3 weeks.  Patient treated with antibiotics for presumed pneumonia 3 weeks ago without significant improvement. She was non compliant with Augmentin.  Also treated with albuterol inhaler and steroids for ongoing shortness of breath.  Today patient and daughter report lack of improvement.  In office today patient severely weak and physician with concern for pneumonia versus cardiac etiology for her symptoms, and directed to emergency department.  Patient ambulatory with cane at baseline, currently too weak and to ambulate independently.  She had a CT performed which shows RUL opacities. (12 Nov 2022 10:51)     above noed:    she used to [employer] smoking in [place] before:  she is on Symbicort at home   never had covid infection:  and she has mild cough:

## 2022-11-19 ENCOUNTER — TRANSCRIPTION ENCOUNTER (OUTPATIENT)
Age: 87
End: 2022-11-19

## 2022-11-19 VITALS
HEART RATE: 73 BPM | TEMPERATURE: 98 F | OXYGEN SATURATION: 97 % | DIASTOLIC BLOOD PRESSURE: 75 MMHG | RESPIRATION RATE: 17 BRPM | SYSTOLIC BLOOD PRESSURE: 143 MMHG

## 2022-11-19 LAB
ANION GAP SERPL CALC-SCNC: 13 MMOL/L — SIGNIFICANT CHANGE UP (ref 7–14)
BASOPHILS # BLD AUTO: 0.03 K/UL — SIGNIFICANT CHANGE UP (ref 0–0.2)
BASOPHILS NFR BLD AUTO: 0.2 % — SIGNIFICANT CHANGE UP (ref 0–2)
BUN SERPL-MCNC: 14 MG/DL — SIGNIFICANT CHANGE UP (ref 7–23)
CALCIUM SERPL-MCNC: 8.1 MG/DL — LOW (ref 8.4–10.5)
CHLORIDE SERPL-SCNC: 98 MMOL/L — SIGNIFICANT CHANGE UP (ref 98–107)
CO2 SERPL-SCNC: 26 MMOL/L — SIGNIFICANT CHANGE UP (ref 22–31)
CREAT SERPL-MCNC: 0.66 MG/DL — SIGNIFICANT CHANGE UP (ref 0.5–1.3)
EGFR: 85 ML/MIN/1.73M2 — SIGNIFICANT CHANGE UP
EOSINOPHIL # BLD AUTO: 0.33 K/UL — SIGNIFICANT CHANGE UP (ref 0–0.5)
EOSINOPHIL NFR BLD AUTO: 2.6 % — SIGNIFICANT CHANGE UP (ref 0–6)
GLUCOSE SERPL-MCNC: 86 MG/DL — SIGNIFICANT CHANGE UP (ref 70–99)
HCT VFR BLD CALC: 42.9 % — SIGNIFICANT CHANGE UP (ref 34.5–45)
HGB BLD-MCNC: 13.5 G/DL — SIGNIFICANT CHANGE UP (ref 11.5–15.5)
IANC: 8.48 K/UL — HIGH (ref 1.8–7.4)
IMM GRANULOCYTES NFR BLD AUTO: 1.3 % — HIGH (ref 0–0.9)
LYMPHOCYTES # BLD AUTO: 18.9 % — SIGNIFICANT CHANGE UP (ref 13–44)
LYMPHOCYTES # BLD AUTO: 2.37 K/UL — SIGNIFICANT CHANGE UP (ref 1–3.3)
MAGNESIUM SERPL-MCNC: 2 MG/DL — SIGNIFICANT CHANGE UP (ref 1.6–2.6)
MCHC RBC-ENTMCNC: 27.4 PG — SIGNIFICANT CHANGE UP (ref 27–34)
MCHC RBC-ENTMCNC: 31.5 GM/DL — LOW (ref 32–36)
MCV RBC AUTO: 87 FL — SIGNIFICANT CHANGE UP (ref 80–100)
MONOCYTES # BLD AUTO: 1.15 K/UL — HIGH (ref 0–0.9)
MONOCYTES NFR BLD AUTO: 9.2 % — SIGNIFICANT CHANGE UP (ref 2–14)
NEUTROPHILS # BLD AUTO: 8.48 K/UL — HIGH (ref 1.8–7.4)
NEUTROPHILS NFR BLD AUTO: 67.8 % — SIGNIFICANT CHANGE UP (ref 43–77)
NRBC # BLD: 0 /100 WBCS — SIGNIFICANT CHANGE UP (ref 0–0)
NRBC # FLD: 0 K/UL — SIGNIFICANT CHANGE UP (ref 0–0)
PHOSPHATE SERPL-MCNC: 3.7 MG/DL — SIGNIFICANT CHANGE UP (ref 2.5–4.5)
PLATELET # BLD AUTO: 319 K/UL — SIGNIFICANT CHANGE UP (ref 150–400)
POTASSIUM SERPL-MCNC: 4.1 MMOL/L — SIGNIFICANT CHANGE UP (ref 3.5–5.3)
POTASSIUM SERPL-SCNC: 4.1 MMOL/L — SIGNIFICANT CHANGE UP (ref 3.5–5.3)
RBC # BLD: 4.93 M/UL — SIGNIFICANT CHANGE UP (ref 3.8–5.2)
RBC # FLD: 14.5 % — SIGNIFICANT CHANGE UP (ref 10.3–14.5)
SODIUM SERPL-SCNC: 137 MMOL/L — SIGNIFICANT CHANGE UP (ref 135–145)
WBC # BLD: 12.52 K/UL — HIGH (ref 3.8–10.5)
WBC # FLD AUTO: 12.52 K/UL — HIGH (ref 3.8–10.5)

## 2022-11-19 RX ORDER — ZINC OXIDE 200 MG/G
1 OINTMENT TOPICAL
Qty: 1 | Refills: 0
Start: 2022-11-19 | End: 2022-11-28

## 2022-11-19 RX ORDER — ATORVASTATIN CALCIUM 80 MG/1
1 TABLET, FILM COATED ORAL
Qty: 30 | Refills: 0
Start: 2022-11-19 | End: 2022-12-18

## 2022-11-19 RX ORDER — MONTELUKAST 4 MG/1
1 TABLET, CHEWABLE ORAL
Qty: 30 | Refills: 0
Start: 2022-11-19 | End: 2022-12-18

## 2022-11-19 RX ORDER — PANTOPRAZOLE SODIUM 20 MG/1
1 TABLET, DELAYED RELEASE ORAL
Qty: 30 | Refills: 0
Start: 2022-11-19 | End: 2022-12-18

## 2022-11-19 RX ORDER — AMLODIPINE BESYLATE 2.5 MG/1
1 TABLET ORAL
Qty: 30 | Refills: 0
Start: 2022-11-19 | End: 2022-12-18

## 2022-11-19 RX ORDER — LEVOTHYROXINE SODIUM 125 MCG
1 TABLET ORAL
Qty: 0 | Refills: 0 | DISCHARGE
Start: 2022-11-19

## 2022-11-19 RX ORDER — TIOTROPIUM BROMIDE 18 UG/1
1 CAPSULE ORAL; RESPIRATORY (INHALATION)
Qty: 0 | Refills: 0 | DISCHARGE
Start: 2022-11-19

## 2022-11-19 RX ORDER — LOSARTAN POTASSIUM 100 MG/1
1 TABLET, FILM COATED ORAL
Qty: 30 | Refills: 0
Start: 2022-11-19 | End: 2022-12-18

## 2022-11-19 RX ORDER — METOPROLOL TARTRATE 50 MG
1 TABLET ORAL
Qty: 30 | Refills: 0
Start: 2022-11-19 | End: 2022-12-18

## 2022-11-19 RX ADMIN — BUDESONIDE AND FORMOTEROL FUMARATE DIHYDRATE 2 PUFF(S): 160; 4.5 AEROSOL RESPIRATORY (INHALATION) at 12:49

## 2022-11-19 RX ADMIN — PANTOPRAZOLE SODIUM 40 MILLIGRAM(S): 20 TABLET, DELAYED RELEASE ORAL at 06:13

## 2022-11-19 RX ADMIN — Medication 100 MILLIGRAM(S): at 12:50

## 2022-11-19 RX ADMIN — Medication 200 MILLIGRAM(S): at 06:16

## 2022-11-19 RX ADMIN — TIOTROPIUM BROMIDE 1 CAPSULE(S): 18 CAPSULE ORAL; RESPIRATORY (INHALATION) at 12:49

## 2022-11-19 RX ADMIN — AMLODIPINE BESYLATE 2.5 MILLIGRAM(S): 2.5 TABLET ORAL at 06:13

## 2022-11-19 RX ADMIN — ZINC OXIDE 1 APPLICATION(S): 200 OINTMENT TOPICAL at 06:17

## 2022-11-19 RX ADMIN — Medication 88 MICROGRAM(S): at 06:13

## 2022-11-19 RX ADMIN — MONTELUKAST 10 MILLIGRAM(S): 4 TABLET, CHEWABLE ORAL at 12:50

## 2022-11-19 RX ADMIN — LOSARTAN POTASSIUM 25 MILLIGRAM(S): 100 TABLET, FILM COATED ORAL at 06:15

## 2022-11-19 RX ADMIN — Medication 200 MILLIGRAM(S): at 12:56

## 2022-11-19 RX ADMIN — ENOXAPARIN SODIUM 40 MILLIGRAM(S): 100 INJECTION SUBCUTANEOUS at 06:14

## 2022-11-19 RX ADMIN — Medication 100 MILLIGRAM(S): at 06:14

## 2022-11-19 RX ADMIN — Medication 50 MILLIGRAM(S): at 06:14

## 2022-11-19 NOTE — PROGRESS NOTE ADULT - PROVIDER SPECIALTY LIST ADULT
Cardiology
Internal Medicine
Internal Medicine
Cardiology
Internal Medicine
Cardiology
Internal Medicine
Cardiology
Pulmonology
Internal Medicine
Pulmonology
Pulmonology
Internal Medicine
Pulmonology
Pulmonology
Internal Medicine

## 2022-11-19 NOTE — DISCHARGE NOTE PROVIDER - CARE PROVIDER_API CALL
Teresa Ziegler)  Internal Medicine  [address] [place] [address]  Phone: [phone]  Fax: [phone]  Follow Up Time:     Ryan Villar)  Critical Care Medicine; Internal Medicine; Pulmonary Disease  [address] [place] [address]  Phone: [phone]  Fax: [phone]  Follow Up Time:     Manpreet Dotson  CARDIOVASCULAR DISEASE  [address] [place] [address]  Phone: [phone]  Fax: [phone]  Follow Up Time:

## 2022-11-19 NOTE — PROGRESS NOTE ADULT - PROBLEM SELECTOR PLAN 1
she is wheezing a lot with lot of coughing:  cont antbitiocs and increase solumedrol as well as BD: change to atc tessalon perles; ct chest reviewed:    11/16: sheis still wheezing and  has lot of dry cough : would cont steroids in same dosages and add Robitussin ATC:
sheis wheezing a lot with lot of coughing:  cont antbitiocs and increase solumedrol as wellas BD: change to atc tessalon perles; ct chest reviewed:
With sepsis from Pneumonia  Pulm karina noted, Cont current meds, Switched solucortef to Medrol.  Add Tessalon for cough  -Leucocytosis-also liekly sec to steroids,mnitor,improving,ID Consult if needed  -c/w albuterol/ipotropium to prn  -pulm f/u noted
With sepsis from Pneumonia  Pulm karina noted, Cont current meds, Switched solucortef to Medrol.  Add Tessalon for cough
she is wheezing a lot with lot of coughing:  cont antbitiocs and increase solumedrol as well as BD: change to atc tessalon perles; ct chest reviewed:    11/16: sheis still wheezing and  has lot of dry cough : would cont steroids in same dosages and add Robitussin ATC:  11/17: she feels anxious:  other wise says her sob as well as cough is better/    can have small dose of xanax in the night to calm her down    11/17: daughter is at bedside:  she looks OK : minimal wheezing:  feels much better: come diarrhea today  : needs to be watched:
With sepsis from Pneumonia.s/p antibiotic  Pulm karina noted, Cont current meds, Switched solucortef to Medrol.  Add Tessalon for cough  -Leucocytosis-also liekly sec to steroids,mnitor,improving,  -c/w albuterol/ipotropium to prn  -pulm f/u noted
she is wheezing a lot with lot of coughing:  cont antbitiocs and increase solumedrol as well as BD: change to atc tessalon perles; ct chest reviewed:    11/16: sheis still wheezing and  has lot of dry cough : would cont steroids in same dosages and add Robitussin ATC:  11/17: she feekls anxious:  other wise says her sob as well as cough is better/    can have small dose of xanax in th enight to calm her down
she is wheezing a lot with lot of coughing:  cont antbitiocs and increase solumedrol as well as BD: change to atc tessalon perles; ct chest reviewed:    11/16: sheis still wheezing and  has lot of dry cough : would cont steroids in same dosages and add Robitussin ATC:  11/17: she feels anxious:  other wise says her sob as well as cough is better/    can have small dose of xanax in the night to calm her down    11/17: daughter is at bedside:  she looks OK : minimal wheezing:  feels much better: come diarrhea today  : needs to be watched:    11/18: she looks better to me:  on room air : not much of wheezing:
With sepsis from Pneumonia  Pulm karina noted, Cont current meds, Switched solucortef to Medrol.  Add Tessalon for cough  -Leucocytosis-also liekly sec to steroids,mnitor,ID Consult
With sepsis from Pneumonia.s/p antibiotic  Pulm karina noted, Cont current meds, Switched solucortef to Medrol.  Add Tessalon for cough  -Leucocytosis-also liekly sec to steroids,mnitor,improving,  -c/w albuterol/ipotropium to prn  -pulm f/u noted
With sepsis from Pneumonia  Pulm karina noted, Cont current meds, Switched solucortef to Medrol.  Add Tessalon for cough  -Leucocytosis-also liekly sec to steroids,mnitor,improving,ID Consult if needed  -change albuterol/ipotropium to prn  -pulm f/u noted
With sepsis from Pneumonia  Pulm karina noted, Cont current meds, Switched solucortef to Medrol.  Add Tessalon for cough  -Leucocytosis-also liekly sec to steroids,mnitor

## 2022-11-19 NOTE — DISCHARGE NOTE PROVIDER - HOSPITAL COURSE
87-year-old female with past medical history of coronary artery disease, stents, cardiac arrest, hyperlipidemia, hypertension, diabetes, asthma, hypothyroidism, cholecystectomy, presenting to the emergency department as directed by her cardiologist for complaint of shortness of breath    Pneumonia   - Pt was treated w/ augmentin 3 weeks ago for presumably pneumonia for no improvement of SOB.   - CT chest showed RUL opacities   - sepsis from pneumonia complicated by COPD exacerbation   - completed 7 days of Antibiotics in hospital and no further signs of infection.     COPD exacerbation.   -Pulm comsulted and recs appreciated Cont current meds, Switched solucortef to Medrol.  -Added Tessalon for cough  -Leucocytosis-also liekly sec to steroids,mnitor,improving,  -c/w albuterol/ipotropium to prn  -pulm outpt f/u.     Coronary artery disease.   -Cont DAPT, BB, Statin.     Hypertension.    -amlodipine,metoprolol,losartan 25 mg added-d/w Cardiology,monitor BP-improved.    Hypothyroid.   - Cont synthroid 88 mcg.    On_________, case was discussed with __________, patient is medically cleared and optimized for discharge today. All medications were reviewed with attending, and sent to mutually agreed upon pharmacy.      87-year-old female with past medical history of coronary artery disease, stents, cardiac arrest, hyperlipidemia, hypertension, diabetes, asthma, hypothyroidism, cholecystectomy, presenting to the emergency department as directed by her cardiologist for complaint of shortness of breath    Pneumonia   - Pt was treated w/ augmentin 3 weeks ago for presumably pneumonia for no improvement of SOB.   - CT chest showed RUL opacities   - sepsis from pneumonia complicated by COPD exacerbation   - completed 7 days of IV ceftriaxone in hospital and no further signs of infection.     COPD exacerbation.   -Pulm comsulted and recs appreciated Cont current meds, Switched solucortef to Medrol.  -Added Tessalon for cough  -Leucocytosis-also liekly sec to steroids,mnitor,improving,  -c/w albuterol/ipotropium to prn  -pulm outpt f/u.     Coronary artery disease.   -Cont DAPT, BB, Statin.     Hypertension.    - cardiology consulted and recs appreciated.   -amlodipine,metoprolol,losartan 25 mg added-d/w Cardiology,monitor BP-improved.    Hypothyroid.   - Cont synthroid 88 mcg.    On 11/19/22, case was discussed with Dr. Campos, patient is medically cleared and optimized for discharge today. All medications were reviewed with attending, and sent to mutually agreed upon pharmacy.

## 2022-11-19 NOTE — DISCHARGE NOTE PROVIDER - CARE PROVIDERS DIRECT ADDRESSES
[email or web address]

## 2022-11-19 NOTE — PROGRESS NOTE ADULT - PROBLEM SELECTOR PLAN 4
Cont synthroid 88 mcg
On levothyroxine
Cont synthroid 88 mcg
Cont synthroid 88 mcg
On levothyroxine
Cont synthroid 88 mcg
On levothyroxine

## 2022-11-19 NOTE — PROGRESS NOTE ADULT - PROBLEM SELECTOR PLAN 5
Cont Lipitor 20

## 2022-11-19 NOTE — DISCHARGE NOTE NURSING/CASE MANAGEMENT/SOCIAL WORK - PATIENT PORTAL LINK FT
You can access the FollowMyHealth Patient Portal offered by [facility] by registering at the following website: [email or web address]. By joining [employer]’s FollowMyHealth portal, you will also be able to view your health information using other applications (apps) compatible with our system.

## 2022-11-19 NOTE — PROGRESS NOTE ADULT - SUBJECTIVE AND OBJECTIVE BOX
[place]  87y  Female      Patient is a 87y old  Female who presents with a chief complaint of Pneumonia  (19 Nov 2022 08:39)  Patient feels better,no sob,no cp,no cough    REVIEW OF SYSTEMS:  CONSTITUTIONAL: No fever  RESPIRATORY: No cough, hemoptysis or shortness of breath  CARDIOVASCULAR: No chest pain, palpitations, dizziness, or leg swelling  GASTROINTESTINAL: No abdominal pain. nausea, vomiting, hematemesis  GENITOURINARY: No dysuria, frequency, hematuria   NEUROLOGICAL: No headaches, no dizziness  MUSCULOSKELETAL: No joint pain or swelling;     INTERVAL HPI/OVERNIGHT EVENTS:  T(C): 36.9 (11-19-22 @ 11:50), Max: 37 (11-18-22 @ 22:05)  HR: 73 (11-19-22 @ 11:50) (72 - 80)  BP: 143/75 (11-19-22 @ 11:50) (118/68 - 143/75)  RR: 17 (11-19-22 @ 11:50) (17 - 17)  SpO2: 97% (11-19-22 @ 11:50) (97% - 98%)  Wt(kg): --  I&O's Summary    19 Nov 2022 07:01  -  19 Nov 2022 13:44  --------------------------------------------------------  IN: 200 mL / OUT: 0 mL / NET: 200 mL      T(C): 36.9 (11-19-22 @ 11:50), Max: 37 (11-18-22 @ 22:05)  HR: 73 (11-19-22 @ 11:50) (72 - 80)  BP: 143/75 (11-19-22 @ 11:50) (118/68 - 143/75)  RR: 17 (11-19-22 @ 11:50) (17 - 17)  SpO2: 97% (11-19-22 @ 11:50) (97% - 98%)  Wt(kg): --Vital Signs Last 24 Hrs  T(C): 36.9 (19 Nov 2022 11:50), Max: 37 (18 Nov 2022 22:05)  T(F): 98.4 (19 Nov 2022 11:50), Max: 98.6 (18 Nov 2022 22:05)  HR: 73 (19 Nov 2022 11:50) (72 - 80)  BP: 143/75 (19 Nov 2022 11:50) (118/68 - 143/75)  BP(mean): --  RR: 17 (19 Nov 2022 11:50) (17 - 17)  SpO2: 97% (19 Nov 2022 11:50) (97% - 98%)    Parameters below as of 19 Nov 2022 11:50  Patient On (Oxygen Delivery Method): room air        LABS:                        13.5   12.52 )-----------( 319      ( 19 Nov 2022 05:50 )             42.9     11-19    137  |  98  |  14  ----------------------------<  86  4.1   |  26  |  0.66    Ca    8.1<L>      19 Nov 2022 05:50  Phos  3.7     11-19  Mg     2.00     11-19          CAPILLARY BLOOD GLUCOSE      POCT Blood Glucose.: 139 mg/dL (19 Nov 2022 12:51)  POCT Blood Glucose.: 113 mg/dL (19 Nov 2022 08:43)  POCT Blood Glucose.: 107 mg/dL (18 Nov 2022 22:25)  POCT Blood Glucose.: 129 mg/dL (18 Nov 2022 17:24)            PAST MEDICAL & SURGICAL HISTORY:  Cardiac arrest      HTN (hypertension)      COPD exacerbation      CAD (coronary artery disease)      Hypothyroid          MEDICATIONS  (STANDING):  amLODIPine   Tablet 2.5 milliGRAM(s) Oral daily  atorvastatin 20 milliGRAM(s) Oral at bedtime  benzonatate 100 milliGRAM(s) Oral every 8 hours  budesonide 160 MICROgram(s)/formoterol 4.5 MICROgram(s) Inhaler 2 Puff(s) Inhalation two times a day  dextrose 5%. 1000 milliLiter(s) (50 mL/Hr) IV Continuous <Continuous>  dextrose 5%. 1000 milliLiter(s) (100 mL/Hr) IV Continuous <Continuous>  dextrose 50% Injectable 25 Gram(s) IV Push once  dextrose 50% Injectable 12.5 Gram(s) IV Push once  dextrose 50% Injectable 25 Gram(s) IV Push once  enoxaparin Injectable 40 milliGRAM(s) SubCutaneous every 24 hours  glucagon  Injectable 1 milliGRAM(s) IntraMuscular once  guaiFENesin Oral Liquid (Sugar-Free) 200 milliGRAM(s) Oral every 6 hours  hydrocortisone hemorrhoidal Suppository 1 Suppository(s) Rectal two times a day  influenza  Vaccine (HIGH DOSE) 0.7 milliLiter(s) IntraMuscular once  insulin lispro (ADMELOG) corrective regimen sliding scale   SubCutaneous three times a day before meals  insulin lispro (ADMELOG) corrective regimen sliding scale   SubCutaneous at bedtime  lactobacillus acidophilus 1 Tablet(s) Oral at bedtime  levothyroxine 88 MICROGram(s) Oral daily  losartan 25 milliGRAM(s) Oral daily  metoprolol succinate ER 50 milliGRAM(s) Oral daily  montelukast 10 milliGRAM(s) Oral daily  pantoprazole    Tablet 40 milliGRAM(s) Oral before breakfast  tiotropium 18 MICROgram(s) Capsule 1 Capsule(s) Inhalation daily  zinc oxide 20% Ointment 1 Application(s) Topical two times a day    MEDICATIONS  (PRN):  acetaminophen     Tablet .. 650 milliGRAM(s) Oral every 6 hours PRN Temp greater or equal to 38C (100.4F), Mild Pain (1 - 3)  albuterol/ipratropium for Nebulization 3 milliLiter(s) Nebulizer every 6 hours PRN Shortness of Breath and/or Wheezing  aluminum hydroxide/magnesium hydroxide/simethicone Suspension 30 milliLiter(s) Oral every 4 hours PRN Dyspepsia  artificial  tears Solution 1 Drop(s) Both EYES three times a day PRN Dry Eyes  dextrose Oral Gel 15 Gram(s) Oral once PRN Blood Glucose LESS THAN 70 milliGRAM(s)/deciliter  guaiFENesin Oral Liquid (Sugar-Free) 200 milliGRAM(s) Oral every 6 hours PRN Cough        RADIOLOGY & ADDITIONAL TESTS:    Imaging Personally Reviewed:  [ ] YES  [ ] NO    Consultant(s) Notes Reviewed:  [x ] YES  [ ] NO    PHYSICAL EXAM:  GENERAL: Alert and awake lying in bed in no distress  HEAD:  Atraumatic, Normocephalic  EYES: EOMI, LEW, conjunctiva and sclera clear  NECK: Supple, No JVD, Normal thyroid  NERVOUS SYSTEM:  Alert & Oriented X3, Motor and sensory systems are intact,   CHEST/LUNG: Bilateral clear breath sounds, no rhochi, no wheezing, no crepitations,  HEART: Regular rate and rhythm; No murmurs, rubs, or gallops  ABDOMEN: Soft, Nontender, Nondistended; Bowel sounds present  EXTREMITIES:   Peripheral Pulses are palpable, no  edema        Care Discussed with Consultants/Other Providers [ ] YES  [ ] NO      Code Status: [] Full Code [] DNR [] DNI [] Goals of Care:   Disposition: [] ICU [] Stroke Unit [] RCU []PCU []Floor [] Discharge Home         ALDO [email or web address].FACP

## 2022-11-19 NOTE — PROGRESS NOTE ADULT - PROBLEM SELECTOR PLAN 2
Cont DAPT, BB, Statin
Cont home meds    11/15: no chest pain    11/16: stable
Cont DAPT, BB, Statin
Cont home meds    11/15: no chest pain    11/16: stable    11/17: no chest pain    11/18: stable
Cont home meds
Cont DAPT, BB, Statin
Cont home meds    11/15: no chest pain    11/16: stable    11/17: no chest pain
Cont DAPT, BB, Statin
Cont home meds    11/15: no chest pain

## 2022-11-19 NOTE — DISCHARGE NOTE NURSING/CASE MANAGEMENT/SOCIAL WORK - NSDCPEFALRISK_GEN_ALL_CORE
For information on Fall & Injury Prevention, visit: [email or web address] OR  [email or web address] OR  https://www.cdc.gov/steadi/patient.html

## 2022-11-19 NOTE — PROGRESS NOTE ADULT - PROBLEM SELECTOR PROBLEM 4
Hypothyroid

## 2022-11-19 NOTE — PROGRESS NOTE ADULT - PROBLEM SELECTOR PLAN 3
-amlodipine,metoprolol,losartan 25 mg added-d/w Cardiology,monitor BP
On Norvasc 2.5, Cozaar 25, Toprol 25
Controlled    11/15: controlled
Controlled    11/15: controlled    11/16: controlled    11/17: controlled
Controlled    11/15: controlled    11/16: controlled    11/17: controlled    11/18: controlled
On Norvasc 2.5, Cozaar 25, Toprol 25
On Norvasc 2.5, Cozaar 25, Toprol 25
Controlled
Controlled    11/15: controlled    11/16: controlled
-amlodipine,metoprolol,losartan 25 mg added-d/w Cardiology,monitor BP-improved
On Norvasc 2.5, Cozaar 25, Toprol 25
-amlodipine,metoprolol,losartan 25 mg added-d/w Cardiology,monitor BP-improved

## 2022-11-19 NOTE — PROGRESS NOTE ADULT - PROBLEM SELECTOR PROBLEM 2
Coronary artery disease

## 2022-11-19 NOTE — DISCHARGE NOTE PROVIDER - NSDCCPCAREPLAN_GEN_ALL_CORE_FT
PRINCIPAL DISCHARGE DIAGNOSIS  Diagnosis: Pneumonia  Assessment and Plan of Treatment: You came to the hospital due to pneumonia, CT scan of your chest showed signs consistent of a pneumonia infection. You completed a course of antibiotics here in the hospital and you were given medications to control your symtoms. Please follow up with your pulmonary doctor in 1-2 weeks for further care and to have repeat blood work performed. If you renewed signs of infections please follow up with your primary care doctor or pulmonary doctor. If you need a pulonary doctor please follow up with Dr. Villar at [address] [place] [address] and call [phone].      SECONDARY DISCHARGE DIAGNOSES  Diagnosis: COPD exacerbation  Assessment and Plan of Treatment: While in the hospital your pneumonia made worse your COPD. Pulmonary was consulted and you were treated for your exacerbation. Please continue to follow up with your pulmonary doctor for further care or if you need one please follow up with Dr. Villar.    Diagnosis: Coronary artery disease  Assessment and Plan of Treatment: Please continue to take your medication as instructed and follow up with your primary care doctor. If you need a cardiologist please follow up with Dr. Dotson at [address] [place] [address] and call [phone] to make a appointment.    Diagnosis: Hypertension  Assessment and Plan of Treatment: Please follow up with your primary care doctor for further medication adjustment.    Diagnosis: Hypothyroid  Assessment and Plan of Treatment: Please continue to take your medication as instructed and follow up with your primary care doctor.

## 2022-11-19 NOTE — DISCHARGE NOTE PROVIDER - NSDCMRMEDTOKEN_GEN_ALL_CORE_FT
amLODIPine 2.5 mg oral tablet: 1 tab(s) orally once a day  Hold if systolic blood pressure is less than 110  atorvastatin 20 mg oral tablet: 1 tab(s) orally once a day (at bedtime)  benzonatate 100 mg oral capsule: 1 cap(s) orally every 8 hours  levothyroxine 88 mcg (0.088 mg) oral tablet: 1 tab(s) orally once a day  losartan 25 mg oral tablet: 1 tab(s) orally once a day  Hold if systolic Blood pressure is less than 100  metoprolol succinate 50 mg oral tablet, extended release: 1 tab(s) orally once a day  Hold if heart rate is less than 60  montelukast 10 mg oral tablet: 1 tab(s) orally once a day  pantoprazole 40 mg oral delayed release tablet: 1 tab(s) orally once a day (before a meal)  tiotropium 18 mcg inhalation capsule: 1 cap(s) inhaled once a day  zinc oxide 20% topical ointment: 1 application topically 2 times a day

## 2022-11-19 NOTE — DISCHARGE NOTE PROVIDER - PROVIDER TOKENS
PROVIDER:[TOKEN:[[id number]]],PROVIDER:[TOKEN:[[id number]]],PROVIDER:[TOKEN:[[id number]]]

## 2022-11-19 NOTE — PROGRESS NOTE ADULT - PROBLEM SELECTOR PROBLEM 5
Lipidemia

## 2023-03-08 NOTE — PATIENT PROFILE ADULT - NSTRANSFERBELONGINGSRESP_GEN_A_NUR
Call placed to Vanessa regarding concerns from her PT with her left arm strength. Her PT stated \"Pretty significant decrease in gross grasp and pinch to her left hand.\"     She states she has been noticing more of a weakness on her left side, but is unsure if its because its actually declining on her left side or if her right side is actually getting stronger that now she's noticing it. Notes her s/s on her left side include pressure sensation around her neck (especially when lying back in a chair), numbness when favoring her left side, left arm weakness in her bicep and when raising her arm, weakening with the grasp and some left shoulder area discomfort. Has been having some fluctuating HR issues. Was on metoprolol and feeling constantly fatigued, so she is unsure if that is impacting her symptoms or not.     Is interested in moving forward with getting the left side done, would like to set up an office visit with Dr. Saab to discuss. Also stated she is going to the [facility] at the end of the month, who signed her up for some thoracic outlet testing. Had questions related to testing that was already completed and what else would she need to done for Dr. Saab. Let her know I would relay this conversation Dr. Saab NP, Marianela, and she will reach out to discuss further. Verbalized understanding.    yes

## 2023-03-23 NOTE — ED ADULT TRIAGE NOTE - CHIEF COMPLAINT QUOTE
BIBEMS for generalized body pain since 7am. Patient has PMH 3 cardiac stents (2010, 2012, 2015), HTN, DM. Was given 1 Tylenol @2030p, 1 nitro @2230 and another @0100. Patient A/Ox4, vitally stable. EKG in progress. [As Noted in HPI] : as noted in HPI [Negative] : Heme/Lymph

## 2023-08-02 ENCOUNTER — INPATIENT (INPATIENT)
Facility: HOSPITAL | Age: 88
LOS: 1 days | Discharge: HOME | End: 2023-08-04
Attending: INTERNAL MEDICINE | Admitting: INTERNAL MEDICINE
Payer: MEDICARE

## 2023-08-02 VITALS
RESPIRATION RATE: 18 BRPM | OXYGEN SATURATION: 98 % | HEART RATE: 75 BPM | SYSTOLIC BLOOD PRESSURE: 123 MMHG | DIASTOLIC BLOOD PRESSURE: 70 MMHG | TEMPERATURE: 98 F

## 2023-08-02 DIAGNOSIS — E11.9 TYPE 2 DIABETES MELLITUS WITHOUT COMPLICATIONS: ICD-10-CM

## 2023-08-02 DIAGNOSIS — Z98.49 CATARACT EXTRACTION STATUS, UNSPECIFIED EYE: Chronic | ICD-10-CM

## 2023-08-02 DIAGNOSIS — Z29.9 ENCOUNTER FOR PROPHYLACTIC MEASURES, UNSPECIFIED: ICD-10-CM

## 2023-08-02 DIAGNOSIS — E03.9 HYPOTHYROIDISM, UNSPECIFIED: ICD-10-CM

## 2023-08-02 DIAGNOSIS — I20.8 OTHER FORMS OF ANGINA PECTORIS: ICD-10-CM

## 2023-08-02 DIAGNOSIS — I25.10 ATHEROSCLEROTIC HEART DISEASE OF NATIVE CORONARY ARTERY WITHOUT ANGINA PECTORIS: ICD-10-CM

## 2023-08-02 DIAGNOSIS — I10 ESSENTIAL (PRIMARY) HYPERTENSION: ICD-10-CM

## 2023-08-02 LAB
ALBUMIN SERPL ELPH-MCNC: 4 G/DL — SIGNIFICANT CHANGE UP (ref 3.3–5)
ALP SERPL-CCNC: 75 U/L — SIGNIFICANT CHANGE UP (ref 40–120)
ALT FLD-CCNC: 12 U/L — SIGNIFICANT CHANGE UP (ref 4–33)
ANION GAP SERPL CALC-SCNC: 9 MMOL/L — SIGNIFICANT CHANGE UP (ref 7–14)
APTT BLD: 31.2 SEC — SIGNIFICANT CHANGE UP (ref 24.5–35.6)
AST SERPL-CCNC: 17 U/L — SIGNIFICANT CHANGE UP (ref 4–32)
BASE EXCESS BLDV CALC-SCNC: 4.9 MMOL/L — HIGH (ref -2–3)
BASOPHILS # BLD AUTO: 0.03 K/UL — SIGNIFICANT CHANGE UP (ref 0–0.2)
BASOPHILS NFR BLD AUTO: 0.4 % — SIGNIFICANT CHANGE UP (ref 0–2)
BILIRUB SERPL-MCNC: 0.4 MG/DL — SIGNIFICANT CHANGE UP (ref 0.2–1.2)
BLOOD GAS VENOUS COMPREHENSIVE RESULT: SIGNIFICANT CHANGE UP
BUN SERPL-MCNC: 17 MG/DL — SIGNIFICANT CHANGE UP (ref 7–23)
CALCIUM SERPL-MCNC: 8.9 MG/DL — SIGNIFICANT CHANGE UP (ref 8.4–10.5)
CHLORIDE BLDV-SCNC: 101 MMOL/L — SIGNIFICANT CHANGE UP (ref 96–108)
CHLORIDE SERPL-SCNC: 101 MMOL/L — SIGNIFICANT CHANGE UP (ref 98–107)
CO2 BLDV-SCNC: 35.6 MMOL/L — HIGH (ref 22–26)
CO2 SERPL-SCNC: 30 MMOL/L — SIGNIFICANT CHANGE UP (ref 22–31)
CREAT SERPL-MCNC: 0.79 MG/DL — SIGNIFICANT CHANGE UP (ref 0.5–1.3)
EGFR: 72 ML/MIN/1.73M2 — SIGNIFICANT CHANGE UP
EOSINOPHIL # BLD AUTO: 0.15 K/UL — SIGNIFICANT CHANGE UP (ref 0–0.5)
EOSINOPHIL NFR BLD AUTO: 1.9 % — SIGNIFICANT CHANGE UP (ref 0–6)
GAS PNL BLDV: 137 MMOL/L — SIGNIFICANT CHANGE UP (ref 136–145)
GLUCOSE BLDV-MCNC: 100 MG/DL — HIGH (ref 70–99)
GLUCOSE SERPL-MCNC: 99 MG/DL — SIGNIFICANT CHANGE UP (ref 70–99)
HCO3 BLDV-SCNC: 34 MMOL/L — HIGH (ref 22–29)
HCT VFR BLD CALC: 41.4 % — SIGNIFICANT CHANGE UP (ref 34.5–45)
HCT VFR BLDA CALC: 39 % — SIGNIFICANT CHANGE UP (ref 34.5–46.5)
HGB BLD CALC-MCNC: 13.1 G/DL — SIGNIFICANT CHANGE UP (ref 11.7–16.1)
HGB BLD-MCNC: 13.2 G/DL — SIGNIFICANT CHANGE UP (ref 11.5–15.5)
IANC: 4.86 K/UL — SIGNIFICANT CHANGE UP (ref 1.8–7.4)
IMM GRANULOCYTES NFR BLD AUTO: 0.3 % — SIGNIFICANT CHANGE UP (ref 0–0.9)
INR BLD: 1.01 RATIO — SIGNIFICANT CHANGE UP (ref 0.85–1.18)
LACTATE BLDV-MCNC: 1.2 MMOL/L — SIGNIFICANT CHANGE UP (ref 0.5–2)
LYMPHOCYTES # BLD AUTO: 2.03 K/UL — SIGNIFICANT CHANGE UP (ref 1–3.3)
LYMPHOCYTES # BLD AUTO: 26.1 % — SIGNIFICANT CHANGE UP (ref 13–44)
MCHC RBC-ENTMCNC: 28 PG — SIGNIFICANT CHANGE UP (ref 27–34)
MCHC RBC-ENTMCNC: 31.9 GM/DL — LOW (ref 32–36)
MCV RBC AUTO: 87.7 FL — SIGNIFICANT CHANGE UP (ref 80–100)
MONOCYTES # BLD AUTO: 0.69 K/UL — SIGNIFICANT CHANGE UP (ref 0–0.9)
MONOCYTES NFR BLD AUTO: 8.9 % — SIGNIFICANT CHANGE UP (ref 2–14)
NEUTROPHILS # BLD AUTO: 4.86 K/UL — SIGNIFICANT CHANGE UP (ref 1.8–7.4)
NEUTROPHILS NFR BLD AUTO: 62.4 % — SIGNIFICANT CHANGE UP (ref 43–77)
NRBC # BLD: 0 /100 WBCS — SIGNIFICANT CHANGE UP (ref 0–0)
NRBC # FLD: 0 K/UL — SIGNIFICANT CHANGE UP (ref 0–0)
NT-PROBNP SERPL-SCNC: <36 PG/ML — SIGNIFICANT CHANGE UP
PCO2 BLDV: 68 MMHG — HIGH (ref 39–52)
PH BLDV: 7.3 — LOW (ref 7.32–7.43)
PLATELET # BLD AUTO: 257 K/UL — SIGNIFICANT CHANGE UP (ref 150–400)
PO2 BLDV: 32 MMHG — SIGNIFICANT CHANGE UP (ref 25–45)
POTASSIUM BLDV-SCNC: 4.6 MMOL/L — SIGNIFICANT CHANGE UP (ref 3.5–5.1)
POTASSIUM SERPL-MCNC: 4.5 MMOL/L — SIGNIFICANT CHANGE UP (ref 3.5–5.3)
POTASSIUM SERPL-SCNC: 4.5 MMOL/L — SIGNIFICANT CHANGE UP (ref 3.5–5.3)
PROT SERPL-MCNC: 6.7 G/DL — SIGNIFICANT CHANGE UP (ref 6–8.3)
PROTHROM AB SERPL-ACNC: 11.3 SEC — SIGNIFICANT CHANGE UP (ref 9.5–13)
RBC # BLD: 4.72 M/UL — SIGNIFICANT CHANGE UP (ref 3.8–5.2)
RBC # FLD: 13.4 % — SIGNIFICANT CHANGE UP (ref 10.3–14.5)
SAO2 % BLDV: 44.1 % — LOW (ref 67–88)
SODIUM SERPL-SCNC: 140 MMOL/L — SIGNIFICANT CHANGE UP (ref 135–145)
TROPONIN T, HIGH SENSITIVITY RESULT: 35 NG/L — SIGNIFICANT CHANGE UP
TROPONIN T, HIGH SENSITIVITY RESULT: 35 NG/L — SIGNIFICANT CHANGE UP
WBC # BLD: 7.78 K/UL — SIGNIFICANT CHANGE UP (ref 3.8–10.5)
WBC # FLD AUTO: 7.78 K/UL — SIGNIFICANT CHANGE UP (ref 3.8–10.5)

## 2023-08-02 PROCEDURE — 99285 EMERGENCY DEPT VISIT HI MDM: CPT

## 2023-08-02 PROCEDURE — 99223 1ST HOSP IP/OBS HIGH 75: CPT

## 2023-08-02 RX ORDER — ISOSORBIDE MONONITRATE 60 MG/1
30 TABLET, EXTENDED RELEASE ORAL DAILY
Refills: 0 | Status: DISCONTINUED | OUTPATIENT
Start: 2023-08-02 | End: 2023-08-04

## 2023-08-02 RX ORDER — RANOLAZINE 500 MG/1
500 TABLET, FILM COATED, EXTENDED RELEASE ORAL DAILY
Refills: 0 | Status: DISCONTINUED | OUTPATIENT
Start: 2023-08-02 | End: 2023-08-04

## 2023-08-02 RX ORDER — SODIUM CHLORIDE 9 MG/ML
1000 INJECTION INTRAMUSCULAR; INTRAVENOUS; SUBCUTANEOUS
Refills: 0 | Status: DISCONTINUED | OUTPATIENT
Start: 2023-08-02 | End: 2023-08-04

## 2023-08-02 RX ORDER — DEXTROSE 50 % IN WATER 50 %
25 SYRINGE (ML) INTRAVENOUS ONCE
Refills: 0 | Status: DISCONTINUED | OUTPATIENT
Start: 2023-08-02 | End: 2023-08-04

## 2023-08-02 RX ORDER — AMLODIPINE BESYLATE 2.5 MG/1
2.5 TABLET ORAL DAILY
Refills: 0 | Status: DISCONTINUED | OUTPATIENT
Start: 2023-08-02 | End: 2023-08-04

## 2023-08-02 RX ORDER — INSULIN LISPRO 100/ML
VIAL (ML) SUBCUTANEOUS AT BEDTIME
Refills: 0 | Status: DISCONTINUED | OUTPATIENT
Start: 2023-08-02 | End: 2023-08-04

## 2023-08-02 RX ORDER — SODIUM CHLORIDE 9 MG/ML
1000 INJECTION, SOLUTION INTRAVENOUS
Refills: 0 | Status: DISCONTINUED | OUTPATIENT
Start: 2023-08-02 | End: 2023-08-04

## 2023-08-02 RX ORDER — DEXTROSE 50 % IN WATER 50 %
12.5 SYRINGE (ML) INTRAVENOUS ONCE
Refills: 0 | Status: DISCONTINUED | OUTPATIENT
Start: 2023-08-02 | End: 2023-08-04

## 2023-08-02 RX ORDER — GLUCAGON INJECTION, SOLUTION 0.5 MG/.1ML
1 INJECTION, SOLUTION SUBCUTANEOUS ONCE
Refills: 0 | Status: DISCONTINUED | OUTPATIENT
Start: 2023-08-02 | End: 2023-08-04

## 2023-08-02 RX ORDER — PANTOPRAZOLE SODIUM 20 MG/1
40 TABLET, DELAYED RELEASE ORAL
Refills: 0 | Status: DISCONTINUED | OUTPATIENT
Start: 2023-08-02 | End: 2023-08-04

## 2023-08-02 RX ORDER — INSULIN LISPRO 100/ML
VIAL (ML) SUBCUTANEOUS
Refills: 0 | Status: DISCONTINUED | OUTPATIENT
Start: 2023-08-02 | End: 2023-08-04

## 2023-08-02 RX ORDER — DEXTROSE 50 % IN WATER 50 %
15 SYRINGE (ML) INTRAVENOUS ONCE
Refills: 0 | Status: DISCONTINUED | OUTPATIENT
Start: 2023-08-02 | End: 2023-08-04

## 2023-08-02 RX ORDER — ONDANSETRON 8 MG/1
4 TABLET, FILM COATED ORAL EVERY 8 HOURS
Refills: 0 | Status: DISCONTINUED | OUTPATIENT
Start: 2023-08-02 | End: 2023-08-04

## 2023-08-02 RX ORDER — ACETAMINOPHEN 500 MG
650 TABLET ORAL EVERY 6 HOURS
Refills: 0 | Status: DISCONTINUED | OUTPATIENT
Start: 2023-08-02 | End: 2023-08-04

## 2023-08-02 RX ORDER — CLOPIDOGREL BISULFATE 75 MG/1
75 TABLET, FILM COATED ORAL DAILY
Refills: 0 | Status: DISCONTINUED | OUTPATIENT
Start: 2023-08-02 | End: 2023-08-04

## 2023-08-02 RX ORDER — ATORVASTATIN CALCIUM 80 MG/1
20 TABLET, FILM COATED ORAL AT BEDTIME
Refills: 0 | Status: DISCONTINUED | OUTPATIENT
Start: 2023-08-02 | End: 2023-08-04

## 2023-08-02 RX ORDER — BUDESONIDE AND FORMOTEROL FUMARATE DIHYDRATE 160; 4.5 UG/1; UG/1
2 AEROSOL RESPIRATORY (INHALATION)
Refills: 0 | Status: DISCONTINUED | OUTPATIENT
Start: 2023-08-02 | End: 2023-08-04

## 2023-08-02 RX ORDER — LEVOTHYROXINE SODIUM 125 MCG
88 TABLET ORAL DAILY
Refills: 0 | Status: DISCONTINUED | OUTPATIENT
Start: 2023-08-02 | End: 2023-08-04

## 2023-08-02 RX ORDER — LANOLIN ALCOHOL/MO/W.PET/CERES
3 CREAM (GRAM) TOPICAL AT BEDTIME
Refills: 0 | Status: DISCONTINUED | OUTPATIENT
Start: 2023-08-02 | End: 2023-08-04

## 2023-08-02 RX ORDER — METOPROLOL TARTRATE 50 MG
50 TABLET ORAL DAILY
Refills: 0 | Status: DISCONTINUED | OUTPATIENT
Start: 2023-08-02 | End: 2023-08-04

## 2023-08-02 RX ADMIN — SODIUM CHLORIDE 60 MILLILITER(S): 9 INJECTION INTRAMUSCULAR; INTRAVENOUS; SUBCUTANEOUS at 19:35

## 2023-08-02 RX ADMIN — ATORVASTATIN CALCIUM 20 MILLIGRAM(S): 80 TABLET, FILM COATED ORAL at 22:20

## 2023-08-02 RX ADMIN — BUDESONIDE AND FORMOTEROL FUMARATE DIHYDRATE 2 PUFF(S): 160; 4.5 AEROSOL RESPIRATORY (INHALATION) at 22:19

## 2023-08-02 NOTE — H&P ADULT - ASSESSMENT
87 y/o F with PMH of CAD(s/p 3 stents), HLD, HTN, DM type II presented to [facility] ER with the chief complaint of chest pain, admitted for angiogram.

## 2023-08-02 NOTE — H&P ADULT - PROBLEM SELECTOR PLAN 1
Patient with chest pain   -Cardiology recs appreciated, admitted for cath   -C/w plavix, Ranolazine, and metoprolol

## 2023-08-02 NOTE — H&P CARDIOLOGY - HISTORY OF PRESENT ILLNESS
Daughter at bedside translating for patient     87 y/o F with PMH of CAD(s/p 3 stents), HLD, HTN, DM type II presented to [facility] ER with the chief complaint of chest pain. As per daughter her mother's chest pain has been intermittent and started about 2-3 weeks ago. Patient was living with her other daughter when the symptoms started. Daughter stated that her mother endorsed of left sided chest discomfort, characterized as a sharp pain, without any aggravating or alleviating factors, without any radiation of the  chest pain, with a severity of 8/10. Daughter also stated that with the chest pain her mother also endorsed of BERRY and SOB. Daughter then took her mother to the cardiologist who started her on Ranexa and Isosorbide mononitrate and was told if she didn't have improvement to come to the ER for possible cardiac cath. This morning patient again developed chest pain while praying and that was when daughter brought patient to the ER. Patient denied any fevers, chills, N/V/D/C, abdominal pain, dysuria, melena, hematochezia, recent travel, sick contact, cough, body aches, pleuritic or positional chest pain.

## 2023-08-02 NOTE — ED PROVIDER NOTE - PROGRESS NOTE DETAILS
Dr. Dotson visited the patient bedside and requested for cath and admission to Dr. Ziegler. Pt will receive cath and be admitted to Dr. Ziegler.

## 2023-08-02 NOTE — ED ADULT NURSE REASSESSMENT NOTE - NS ED NURSE REASSESS COMMENT FT1
Patient to go to cath lab at this time, report given to Maged PETTY. Patient A&Ox4, RR equal and unlabored, NSR noted on cardiac monitor, accompanied by family member (personal belongings given to family member).  IV intact. Care plan continued. Comfort measures provided. Safety maintained.

## 2023-08-02 NOTE — ED ADULT TRIAGE NOTE - CHIEF COMPLAINT QUOTE
pt c/o of lt sided chest pain radiating to lt arm since this am, pt denies any sob, pt with hx MI followed with cardiac arrest in the past

## 2023-08-02 NOTE — H&P ADULT - HISTORY OF PRESENT ILLNESS
89 y/o F with PMH of CAD(s/p 3 stents), HLD, HTN, DM type II presented to [facility] ER with the chief complaint of chest pain. As per daughter her mother's chest pain has been intermittent and started about 2-3 weeks ago. Patient was living with her other daughter when the symptoms started. Daughter stated that her mother endorsed of left sided chest discomfort, characterized as a sharp pain, without any aggravating or alleviating factors, without any radiation of the  chest pain, with a severity of 8/10. Daughter also stated that with the chest pain her mother also endorsed of BERRY and SOB. Daughter then took her mother to the cardiologist who started her on Ranexa and Isosorbide mononitrate and was told if she didn't have improvement to come to the ER for possible cardiac cath. This morning patient again developed chest pain while praying and that was when daughter brought patient to the ER. Patient denied any fevers, chills, N/V/D/C, abdominal pain, dysuria, melena, hematochezia, recent travel, sick contact, cough, body aches, pleuritic or positional chest pain.
vomiting blood

## 2023-08-02 NOTE — ED PROVIDER NOTE - ATTENDING CONTRIBUTION TO CARE
Agree with resident note  88-year-old female with history of CAD with stents, hypertension, hyperlipidemia, diabetes presents with chest pain since 6 AM this morning daughter provides entire history.  Daughter  recently admitted to a community hospital for chest pain.  Daughter attributes this to increasing stressors and anxiety.   was in the hospital for 3 days.  Went to see her cardiologist Dr. Dotson who adjusted her medications which include isosorbide.  Patient took isosorbide last night this morning awoke with headache chest pain not feeling well.  Daughter  patient was told she was not a candidate for catheterization.  Patient denies cough fever.   pain is similar to pain she had earlier in the week when she was admitted to outside hospital.  Physical exam  Well-appearing female in no distress, anxious  Lungs clear to auscultation bilaterally  S1-S2 no murmurs rubs or gallops  Abdomen soft nontender nondistended  Extremities no edema  Impression  Rule out ACS we will draw labs including troponin get chest x-ray unclear of what occurred at outside hospital in terms of testing  We will reach out to Dr. Dotson who works here at [facility] ([facility]) and follow his plan regarding admission versus discharge

## 2023-08-02 NOTE — H&P ADULT - NSHPPHYSICALEXAM_GEN_ALL_CORE
GENERAL: NAD, well-developed  HEENT:  Atraumatic, Normocephalic, EOMI, PERRLA, conjunctiva and sclera clear, oral mucosa moist, clear w/o any exudate   NECK: Supple, No JVD  CHEST/LUNG: Clear to auscultation bilaterally; No wheeze  HEART: Regular rate and rhythm; No murmurs, rubs, or gallops  ABDOMEN: Soft, Nontender, Nondistended; Bowel sounds present  EXTREMITIES:  2+ Peripheral Pulses, No clubbing, cyanosis, or edema  PSYCH: AAOx3  NEUROLOGY: non-focal  SKIN: No rashes or lesions    GENERAL: NAD, well-developed  HEENT:  Atraumatic, Normocephalic, EOMI, PERRLA, conjunctiva and sclera clear, oral mucosa moist, clear w/o any exudate   NECK: Supple, No JVD  CHEST/LUNG: Clear to auscultation bilaterally; No wheeze  HEART: Regular rate and rhythm; No murmurs, rubs, or gallops  ABDOMEN: Soft, Nontender, Nondistended; Bowel sounds present  EXTREMITIES:  2+ Peripheral Pulses, No clubbing, cyanosis, or edema  PSYCH: AAOx3, normal affect  NEUROLOGY: non-focal  SKIN: No rashes or lesions

## 2023-08-02 NOTE — H&P ADULT - NSHPLABSRESULTS_GEN_ALL_CORE
13.2   7.78  )-----------( 257      ( 02 Aug 2023 15:24 )             41.4     Hgb Trend: 13.2<--  08-02    140  |  101  |  17  ----------------------------<  99  4.5   |  30  |  0.79    Ca    8.9      02 Aug 2023 15:24    TPro  6.7  /  Alb  4.0  /  TBili  0.4  /  DBili  x   /  AST  17  /  ALT  12  /  AlkPhos  75  08-02    Creatinine Trend: 0.79<--  PT/INR - ( 02 Aug 2023 15:24 )   PT: 11.3 sec;   INR: 1.01 ratio         PTT - ( 02 Aug 2023 15:24 )  PTT:31.2 sec      Urinalysis Basic - ( 02 Aug 2023 15:24 )    Color: x / Appearance: x / SG: x / pH: x  Gluc: 99 mg/dL / Ketone: x  / Bili: x / Urobili: x   Blood: x / Protein: x / Nitrite: x   Leuk Esterase: x / RBC: x / WBC x   Sq Epi: x / Non Sq Epi: x / Bacteria: x

## 2023-08-02 NOTE — CHART NOTE - NSCHARTNOTEFT_GEN_A_CORE
[place]  [id number] 88y    Patient status post LHC via RFA access. Site clean, dry, and intact. No hematoma or active bleeding. No blood noted on gauze. Extremities warm to touch. Pulses present b/l, capillary refill appropriate. No bruits noted on ausculation of femoral artery. Denies any pain, numbness or tingling. Will continue to monitor.    T(C): 36.9 (08-02-23 @ 17:13), Max: 36.9 (08-02-23 @ 17:13)  HR: 74 (08-02-23 @ 17:13) (74 - 80)  BP: 136/62 (08-02-23 @ 17:13) (117/58 - 136/62)  RR: 18 (08-02-23 @ 17:13) (18 - 20)  SpO2: 96% (08-02-23 @ 17:13) (96% - 98%)    Denilson Conway PA-C  Medicine ACP

## 2023-08-02 NOTE — ED PROVIDER NOTE - CLINICAL SUMMARY MEDICAL DECISION MAKING FREE TEXT BOX
89 y/o F w PMHx of CAD s/p stents, CA in 2014, HLD, HTN, DM c/o CP since 6am this morning when she experienced moderate midsternal non radiating non-exertional chest pain without diaphoresis. Pt was seen yesterday by Dr. Dotosn her cardiologist who adjusted her medications to add nitroglycerin isosorbide and ranexa. EKG consistent with prior EKG. VSS. Clinically stable. Moderate to low suspision for ACS vs. angina vs. anxiety    Will screen for ACS (troponin), anemia/electrolytes, and chest x ray to screen for cardiopulmonary pathology.

## 2023-08-02 NOTE — ED ADULT NURSE NOTE - OBJECTIVE STATEMENT
Patient received to room 12 A&Ox4, ambulatory with cane/walker at baseline, Albanian speaking (family at bedside translating) with past medical history MI, cardiac arrest with intubation, DM coming to the ED brought by EMS sent by Dr. Dickens office for complaints of left sided chest pain that radiates into the left arm that started this morning at approximately 6am. Patient was in a community hospital from Saturday until Monday for the same complaints. Patient was discharged and then woke up this morning with worsening symptoms. Patient states "she felt like she was going to die". Patient endorsing weakness and SOB at this time, brought in by EMS on 2L NC. Patient RR equal and unlabored at this time, no apparent respiratory distress noted at this time. Patient placed on cardiac monitor. NSR noted. Abdomen soft, non-tender, non-distended. Patient states "too weak to lift arms up". Denies N/V/D, abdominal pain, dizziness at this time. 20G IV placed in the R forearm, labs drawn and sent. Care plan continued. Comfort measures provided. Safety maintained. Awaiting lab results and imaging.

## 2023-08-02 NOTE — ED PROVIDER NOTE - OBJECTIVE STATEMENT
87 y/o F w PMHx of CAD s/p stents, CA in 2014, HLD, HTN, DM c/o CP since 6am this morning. Pt history provided by daughter who is at bedside. States she was praying when she experienced moderate midsternal non radiating non-exertional chest pain without diaphoresis . Pt was seen yesterday by Dr. Dotson her cardiologist who adjusted her medications to add nitroglycerin isosorbide and ranexa. At the visit, she states the EKG was normal. Daughter states that Dr. Dotson is unsure whether she will be a candidate for angiography. Denies fevers, chills, nausea, vomiting, dizziness, SOB, abdominal pain, dysuria, hematuria.

## 2023-08-02 NOTE — ED PROVIDER NOTE - PHYSICAL EXAMINATION
Gen: AAOx3, non-toxic  Head: NCAT  HEENT: EOMI, oral mucosa moist, normal conjunctiva  Lung: CTAB, no respiratory distress, no wheezes/rhonchi/rales B/L,   CV: RRR, no murmurs, rubs or gallops. Non reproducible chest pain  Abd: soft, NTND, no guarding, no CVA tenderness  MSK: no visible deformities  Neuro: No focal sensory or motor deficits  Skin: Warm, well perfused, no rash  Psych: normal affect.

## 2023-08-02 NOTE — ED PROVIDER NOTE - NS ED ROS FT
GENERAL: Denies weight loss, fever and chills.  EYES: denies change in vision,   HEENT: denies trouble swallowing or speaking,   CARDIAC: + chest pain denies palpitations   PULMONARY: denies cough or SOB,  GI: denies abdominal pain, no nausea, no vomiting, no diarrhea or constipation,   : Denies dysuria and urinary frequency, hematuria   SKIN: denies rashes,   NEURO: denies headache, dizziness  MSK: Denies myalgia and joint pain.  PSYCHIATRIC: Denies recent changes in mood. Denies anxiety and depression.    All other ROS negative unless otherwise specified in HPI.

## 2023-08-03 ENCOUNTER — TRANSCRIPTION ENCOUNTER (OUTPATIENT)
Age: 88
End: 2023-08-03

## 2023-08-03 LAB
ANION GAP SERPL CALC-SCNC: 8 MMOL/L — SIGNIFICANT CHANGE UP (ref 7–14)
BASOPHILS # BLD AUTO: 0.04 K/UL — SIGNIFICANT CHANGE UP (ref 0–0.2)
BASOPHILS NFR BLD AUTO: 0.5 % — SIGNIFICANT CHANGE UP (ref 0–2)
BUN SERPL-MCNC: 12 MG/DL — SIGNIFICANT CHANGE UP (ref 7–23)
CALCIUM SERPL-MCNC: 8.7 MG/DL — SIGNIFICANT CHANGE UP (ref 8.4–10.5)
CHLORIDE SERPL-SCNC: 103 MMOL/L — SIGNIFICANT CHANGE UP (ref 98–107)
CO2 SERPL-SCNC: 30 MMOL/L — SIGNIFICANT CHANGE UP (ref 22–31)
CREAT SERPL-MCNC: 0.64 MG/DL — SIGNIFICANT CHANGE UP (ref 0.5–1.3)
EGFR: 85 ML/MIN/1.73M2 — SIGNIFICANT CHANGE UP
EOSINOPHIL # BLD AUTO: 0.24 K/UL — SIGNIFICANT CHANGE UP (ref 0–0.5)
EOSINOPHIL NFR BLD AUTO: 2.9 % — SIGNIFICANT CHANGE UP (ref 0–6)
GLUCOSE SERPL-MCNC: 96 MG/DL — SIGNIFICANT CHANGE UP (ref 70–99)
HCT VFR BLD CALC: 40.6 % — SIGNIFICANT CHANGE UP (ref 34.5–45)
HGB BLD-MCNC: 12.7 G/DL — SIGNIFICANT CHANGE UP (ref 11.5–15.5)
IANC: 4.82 K/UL — SIGNIFICANT CHANGE UP (ref 1.8–7.4)
IMM GRANULOCYTES NFR BLD AUTO: 0.4 % — SIGNIFICANT CHANGE UP (ref 0–0.9)
LYMPHOCYTES # BLD AUTO: 2.5 K/UL — SIGNIFICANT CHANGE UP (ref 1–3.3)
LYMPHOCYTES # BLD AUTO: 29.8 % — SIGNIFICANT CHANGE UP (ref 13–44)
MAGNESIUM SERPL-MCNC: 2.1 MG/DL — SIGNIFICANT CHANGE UP (ref 1.6–2.6)
MCHC RBC-ENTMCNC: 27.1 PG — SIGNIFICANT CHANGE UP (ref 27–34)
MCHC RBC-ENTMCNC: 31.3 GM/DL — LOW (ref 32–36)
MCV RBC AUTO: 86.6 FL — SIGNIFICANT CHANGE UP (ref 80–100)
MONOCYTES # BLD AUTO: 0.77 K/UL — SIGNIFICANT CHANGE UP (ref 0–0.9)
MONOCYTES NFR BLD AUTO: 9.2 % — SIGNIFICANT CHANGE UP (ref 2–14)
NEUTROPHILS # BLD AUTO: 4.82 K/UL — SIGNIFICANT CHANGE UP (ref 1.8–7.4)
NEUTROPHILS NFR BLD AUTO: 57.2 % — SIGNIFICANT CHANGE UP (ref 43–77)
NRBC # BLD: 0 /100 WBCS — SIGNIFICANT CHANGE UP (ref 0–0)
NRBC # FLD: 0 K/UL — SIGNIFICANT CHANGE UP (ref 0–0)
PHOSPHATE SERPL-MCNC: 3.7 MG/DL — SIGNIFICANT CHANGE UP (ref 2.5–4.5)
PLATELET # BLD AUTO: 251 K/UL — SIGNIFICANT CHANGE UP (ref 150–400)
POTASSIUM SERPL-MCNC: 4.1 MMOL/L — SIGNIFICANT CHANGE UP (ref 3.5–5.3)
POTASSIUM SERPL-SCNC: 4.1 MMOL/L — SIGNIFICANT CHANGE UP (ref 3.5–5.3)
RBC # BLD: 4.69 M/UL — SIGNIFICANT CHANGE UP (ref 3.8–5.2)
RBC # FLD: 13.5 % — SIGNIFICANT CHANGE UP (ref 10.3–14.5)
SODIUM SERPL-SCNC: 141 MMOL/L — SIGNIFICANT CHANGE UP (ref 135–145)
WBC # BLD: 8.4 K/UL — SIGNIFICANT CHANGE UP (ref 3.8–10.5)
WBC # FLD AUTO: 8.4 K/UL — SIGNIFICANT CHANGE UP (ref 3.8–10.5)

## 2023-08-03 RX ORDER — MIRTAZAPINE 45 MG/1
7.5 TABLET, ORALLY DISINTEGRATING ORAL DAILY
Refills: 0 | Status: DISCONTINUED | OUTPATIENT
Start: 2023-08-03 | End: 2023-08-03

## 2023-08-03 RX ADMIN — Medication 650 MILLIGRAM(S): at 21:25

## 2023-08-03 RX ADMIN — Medication 88 MICROGRAM(S): at 05:52

## 2023-08-03 RX ADMIN — BUDESONIDE AND FORMOTEROL FUMARATE DIHYDRATE 2 PUFF(S): 160; 4.5 AEROSOL RESPIRATORY (INHALATION) at 21:24

## 2023-08-03 RX ADMIN — PANTOPRAZOLE SODIUM 40 MILLIGRAM(S): 20 TABLET, DELAYED RELEASE ORAL at 05:53

## 2023-08-03 RX ADMIN — BUDESONIDE AND FORMOTEROL FUMARATE DIHYDRATE 2 PUFF(S): 160; 4.5 AEROSOL RESPIRATORY (INHALATION) at 09:21

## 2023-08-03 RX ADMIN — AMLODIPINE BESYLATE 2.5 MILLIGRAM(S): 2.5 TABLET ORAL at 05:53

## 2023-08-03 RX ADMIN — Medication 50 MILLIGRAM(S): at 05:53

## 2023-08-03 RX ADMIN — ATORVASTATIN CALCIUM 20 MILLIGRAM(S): 80 TABLET, FILM COATED ORAL at 21:25

## 2023-08-03 RX ADMIN — ISOSORBIDE MONONITRATE 30 MILLIGRAM(S): 60 TABLET, EXTENDED RELEASE ORAL at 12:19

## 2023-08-03 RX ADMIN — CLOPIDOGREL BISULFATE 75 MILLIGRAM(S): 75 TABLET, FILM COATED ORAL at 12:19

## 2023-08-03 RX ADMIN — Medication 10 MILLIGRAM(S): at 21:24

## 2023-08-03 NOTE — DISCHARGE NOTE PROVIDER - NSFOLLOWUPCLINICS_GEN_ALL_ED_FT
[facility]  Psychiatry  [address] [place] [address]  Phone: [phone]  Fax:

## 2023-08-03 NOTE — DISCHARGE NOTE NURSING/CASE MANAGEMENT/SOCIAL WORK - NSDCPEFALRISK_GEN_ALL_CORE
For information on Fall & Injury Prevention, visit: [email or web address] OR  [email or web address] OR  https://www.cdc.gov/steadi/patient.html

## 2023-08-03 NOTE — PATIENT PROFILE ADULT - DO YOU NEED ADDITIONAL SERVICES TO MANAGE ANY OF THESE MEDICAL CONDITIONS AT HOME?
Fabio Munoz,  First of all, is your rash looking better? I hope so.  Hairloss is very complicated.  But I do not believe it is due to prednisone. What is going on with hair now is a reflection of what happened 2-3 months ago.  (same reason people lose hair about 3 months after child birth or something very stressful like surgery, car accident etc).   It would not start a couple days of something. It could be that you are noticing it more now coincidentally.     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  If you want me to evaluate the hairloss, we can talk about it at your next visit further.  First line treatment is topical minoxidil (rogaine) and it actually works well in about 70% of people (I use it).  I actually recommend it if hormonal loss runs in your family.  But it may be something different so let's take a look when you are here for your follow up.   Thanks,  Ken no

## 2023-08-03 NOTE — PATIENT PROFILE ADULT - FALL HARM RISK - HARM RISK INTERVENTIONS
Assistance with ambulation/Assistance OOB with selected safe patient handling equipment/Communicate Risk of Fall with Harm to all staff/Discuss with provider need for PT consult/Monitor gait and stability/Reinforce activity limits and safety measures with patient and family/Tailored Fall Risk Interventions/Visual Cue: Yellow wristband and red socks/Bed in lowest position, wheels locked, appropriate side rails in place/Call bell, personal items and telephone in reach/Instruct patient to call for assistance before getting out of bed or chair/Non-slip footwear when patient is out of bed/[place] to call system/Physically safe environment - no spills, clutter or unnecessary equipment/Purposeful Proactive Rounding/Room/bathroom lighting operational, light cord in reach

## 2023-08-03 NOTE — DISCHARGE NOTE NURSING/CASE MANAGEMENT/SOCIAL WORK - NSDCFUADDAPPT_GEN_ALL_CORE_FT
Follow up with PCP within 1-2 weeks of discharge. If you are in need of a general medicine physician and post-discharge medical follow-up for further care/recommendations you may contact the [facility] for an appointment at [phone].     Follow up with cardiology within 1-2 weeks of discharge. If you are in need of a general cardiologist after discharge, you may contact the [facility] for an appointment at [phone].

## 2023-08-03 NOTE — DISCHARGE NOTE PROVIDER - NSDCMRMEDTOKEN_GEN_ALL_CORE_FT
amLODIPine 2.5 mg oral tablet: 1 tab(s) orally once a day  Hold if systolic blood pressure is less than 110  atorvastatin 20 mg oral tablet: 1 tab(s) orally once a day (at bedtime)  isosorbide mononitrate 30 mg oral tablet, extended release: 1 tab(s) orally once a day  levothyroxine 88 mcg (0.088 mg) oral tablet: 1 tab(s) orally once a day  metFORMIN 500 mg oral tablet: 1 tab(s) orally 2 times a day  metoprolol succinate 50 mg oral tablet, extended release: 1 tab(s) orally once a day  Hold if heart rate is less than 60  pantoprazole 40 mg oral delayed release tablet: 1 tab(s) orally once a day (before a meal)  Plavix 75 mg oral tablet: 1 tab(s) orally once a day  ranolazine 500 mg oral tablet, extended release: 1 tab(s) orally once a day  Symbicort 80 mcg-4.5 mcg/inh inhalation aerosol: 2 puff(s) inhaled 2 times a day   amLODIPine 2.5 mg oral tablet: 1 tab(s) orally once a day  Hold if systolic blood pressure is less than 110  atorvastatin 20 mg oral tablet: 1 tab(s) orally once a day (at bedtime)  isosorbide mononitrate 30 mg oral tablet, extended release: 1 tab(s) orally once a day  levothyroxine 88 mcg (0.088 mg) oral tablet: 1 tab(s) orally once a day  metFORMIN 500 mg oral tablet: 1 tab(s) orally 2 times a day  metoprolol succinate 50 mg oral tablet, extended release: 1 tab(s) orally once a day  Hold if heart rate is less than 60  pantoprazole 40 mg oral delayed release tablet: 1 tab(s) orally once a day (before a meal)  PARoxetine 10 mg oral tablet: 1 tab(s) orally once a day (at bedtime)  Plavix 75 mg oral tablet: 1 tab(s) orally once a day  ranolazine 500 mg oral tablet, extended release: 1 tab(s) orally once a day  Symbicort 80 mcg-4.5 mcg/inh inhalation aerosol: 2 puff(s) inhaled 2 times a day

## 2023-08-03 NOTE — DISCHARGE NOTE PROVIDER - NSDCFUADDAPPT_GEN_ALL_CORE_FT
Follow up with PCP within 1-2 weeks of discharge. If you are in need of a general medicine physician and post-discharge medical follow-up for further care/recommendations you may contact the [facility] for an appointment at [phone].     Follow up with cardiology within 1-2 weeks of discharge. If you are in need of a general cardiologist after discharge, you may contact the [facility] for an appointment at [phone].

## 2023-08-03 NOTE — DISCHARGE NOTE PROVIDER - HOSPITAL COURSE
87 y/o female with PMHx of CAD(s/p 3 stents), HLD, HTN, DM type II presented to [facility] with the chief complaint of chest pain, admitted for angiogram.  8/2 LHC: Patent stents. LVEDP: 13. RFA access site. To continue Plavix, Ranolazine, and Metoprolol.Will continue rest of home medications. Groin site check stable after procedure.   Patient seen and evaluated, no acute somatic complaints. Reviewed discharge medications with patient; All new medications requiring new prescriptions were sent to the pharmacy of patient's choice. Reviewed need for prescription for previous home medications and new prescriptions sent if requested. Medically cleared/stable for discharge as per Dr. DURAN with close outpatient follow up within 1-2 weeks of discharge. Patient understands and agrees with plan of care. 89 y/o female with PMHx of CAD(s/p 3 stents), HLD, HTN, DM type II presented to [facility] with the chief complaint of chest pain, admitted for angiogram.  8/2 LHC: Patent stents. LVEDP: 13. RFA access site. To continue Plavix, Ranolazine, and Metoprolol.Will continue rest of home medications. Groin site check stable after procedure. Patient endorsed anxiety. Started patient on Paxil, she will follow  up with psychiatrist at Dr Ziegler's office.   Patient seen and evaluated, no acute somatic complaints. Reviewed discharge medications with patient; All new medications requiring new prescriptions were sent to the pharmacy of patient's choice. Reviewed need for prescription for previous home medications and new prescriptions sent if requested. Medically cleared/stable for discharge as per Dr. ZIEGLER with close outpatient follow up within 1-2 weeks of discharge. Patient understands and agrees with plan of care.

## 2023-08-03 NOTE — DISCHARGE NOTE NURSING/CASE MANAGEMENT/SOCIAL WORK - PATIENT PORTAL LINK FT
You can access the FollowMyHealth Patient Portal offered by [facility] by registering at the following website: [email or web address]. By joining [employer]’s FollowMyHealth portal, you will also be able to view your health information using other applications (apps) compatible with our system.

## 2023-08-03 NOTE — DISCHARGE NOTE PROVIDER - NSDCCPCAREPLAN_GEN_ALL_CORE_FT
PRINCIPAL DISCHARGE DIAGNOSIS  Diagnosis: Chronic stable angina  Assessment and Plan of Treatment: You came in with chest pain and underwent a left heart catherization. You did not require any intervention, Continue Plavix, Ranolazine, and Metoprolol, as well as the rest of your home medications. DO NOT RESUME YOUR METFORMIN UNTIL SATURDAY 8/5. Monitor site of procedure for swelling, redness or bleeding. Please notify your doctor if any of these symptoms are noted. No heavy lifting with procedure wrist greater than 5-10 lbs for one week. No strenuous activity for 3 weeks. You may shower. Follow up with cardiology within 1-2 weeks of discharge. If you are in need of a general cardiologist after discharge, you may contact the [facility] for an appointment at [phone].     PRINCIPAL DISCHARGE DIAGNOSIS  Diagnosis: Chronic stable angina  Assessment and Plan of Treatment: You came in with chest pain and underwent a left heart catherization. You did not require any intervention, Continue Plavix, Ranolazine, and Metoprolol, as well as the rest of your home medications. DO NOT RESUME YOUR METFORMIN UNTIL SATURDAY 8/5. You were started on Paxil, follow up with psychiatrist at Dr Ziegler's office.  Monitor site of procedure for swelling, redness or bleeding. Please notify your doctor if any of these symptoms are noted. No heavy lifting with procedure wrist greater than 5-10 lbs for one week. No strenuous activity for 3 weeks. You may shower. Follow up with cardiology within 1-2 weeks of discharge. If you are in need of a general cardiologist after discharge, you may contact the [facility] for an appointment at [phone].

## 2023-08-04 VITALS
HEART RATE: 73 BPM | DIASTOLIC BLOOD PRESSURE: 65 MMHG | TEMPERATURE: 98 F | OXYGEN SATURATION: 96 % | RESPIRATION RATE: 18 BRPM | SYSTOLIC BLOOD PRESSURE: 138 MMHG

## 2023-08-04 RX ADMIN — Medication 50 MILLIGRAM(S): at 05:47

## 2023-08-04 RX ADMIN — ISOSORBIDE MONONITRATE 30 MILLIGRAM(S): 60 TABLET, EXTENDED RELEASE ORAL at 11:31

## 2023-08-04 RX ADMIN — AMLODIPINE BESYLATE 2.5 MILLIGRAM(S): 2.5 TABLET ORAL at 05:48

## 2023-08-04 RX ADMIN — PANTOPRAZOLE SODIUM 40 MILLIGRAM(S): 20 TABLET, DELAYED RELEASE ORAL at 05:47

## 2023-08-04 RX ADMIN — BUDESONIDE AND FORMOTEROL FUMARATE DIHYDRATE 2 PUFF(S): 160; 4.5 AEROSOL RESPIRATORY (INHALATION) at 08:30

## 2023-08-04 RX ADMIN — RANOLAZINE 500 MILLIGRAM(S): 500 TABLET, FILM COATED, EXTENDED RELEASE ORAL at 11:30

## 2023-08-04 RX ADMIN — CLOPIDOGREL BISULFATE 75 MILLIGRAM(S): 75 TABLET, FILM COATED ORAL at 11:30

## 2023-08-04 RX ADMIN — Medication 88 MICROGRAM(S): at 05:47

## 2023-08-04 NOTE — PHARMACOTHERAPY INTERVENTION NOTE - COMMENTS
Discharge medications reviewed with patient's daughter. Outpatient medication schedule was discussed in detail including: medication name, indication, dose, administration times, treatment duration, side effects and special instructions. Patient questions and concerns were answered and addressed. Patient demonstrated understanding. Informed patient that medication list may change prior to discharge. Patient provided with educational handout.     Madison Caban, PharmD, Clinical Pharmacy Specialist, [id number]

## 2023-08-04 NOTE — PROGRESS NOTE ADULT - PROBLEM SELECTOR PLAN 1
Patient with chest pain   -Cardiology recs appreciated, non obstructive disease noted  Chest pain probably from anxiety, will arrange out patient BH evaluation. Started on Paxil  -C/w plavix, Ranolazine, and metoprolol
Patient with chest pain   -Cardiology recs appreciated, non obstructive disease noted  Chest pain probably from anxiety, will arrange out patient BH evaluation.  -C/w plavix, Ranolazine, and metoprolol

## 2023-08-04 NOTE — PROGRESS NOTE ADULT - SUBJECTIVE AND OBJECTIVE BOX
Patient is a 88y old  Female who presents with a chief complaint of chest pain (03 Aug 2023 09:44)    8/4/2023  HPI:  s/p angiogram, non obstructive coronaries noted.  Feels anxious, dizzy    PAST MEDICAL & SURGICAL HISTORY:  Cardiac arrest      HTN (hypertension)      COPD exacerbation      CAD (coronary artery disease)      Hypothyroid      No significant past surgical history          Review of Systems:   CONSTITUTIONAL: No fever, weight loss, or fatigue  EYES: No eye pain, visual disturbances, or discharge  ENMT:  No difficulty hearing, tinnitus, vertigo; No sinus or throat pain  NECK: No pain or stiffness  BREASTS: No pain, masses, or nipple discharge  RESPIRATORY: No cough, wheezing, chills or hemoptysis; No shortness of breath  CARDIOVASCULAR: No chest pain, palpitations, dizziness, or leg swelling  GASTROINTESTINAL: No abdominal or epigastric pain. No nausea, vomiting, or hematemesis; No diarrhea or constipation. No melena or hematochezia.  GENITOURINARY: No dysuria, frequency, hematuria, or incontinence  NEUROLOGICAL: No headaches, memory loss, loss of strength, numbness, or tremors  SKIN: No itching, burning, rashes, or lesions   LYMPH NODES: No enlarged glands  ENDOCRINE: No heat or cold intolerance; No hair loss  MUSCULOSKELETAL: No joint pain or swelling; No muscle, back, or extremity pain  PSYCHIATRIC: No depression, anxiety, mood swings, or difficulty sleeping  HEME/LYMPH: No easy bruising, or bleeding gums  ALLERY AND IMMUNOLOGIC: No hives or eczema    Allergies    No Known Allergies    Intolerances        Social History:     FAMILY HISTORY:  No pertinent family history in first degree relatives        MEDICATIONS  (STANDING):  amLODIPine   Tablet 2.5 milliGRAM(s) Oral daily  atorvastatin 20 milliGRAM(s) Oral at bedtime  budesonide  80 MICROgram(s)/formoterol 4.5 MICROgram(s) Inhaler 2 Puff(s) Inhalation two times a day  clopidogrel Tablet 75 milliGRAM(s) Oral daily  dextrose 5%. 1000 milliLiter(s) (50 mL/Hr) IV Continuous <Continuous>  dextrose 5%. 1000 milliLiter(s) (100 mL/Hr) IV Continuous <Continuous>  dextrose 50% Injectable 25 Gram(s) IV Push once  dextrose 50% Injectable 12.5 Gram(s) IV Push once  dextrose 50% Injectable 25 Gram(s) IV Push once  glucagon  Injectable 1 milliGRAM(s) IntraMuscular once  insulin lispro (ADMELOG) corrective regimen sliding scale   SubCutaneous three times a day before meals  insulin lispro (ADMELOG) corrective regimen sliding scale   SubCutaneous at bedtime  isosorbide   mononitrate ER Tablet (IMDUR) 30 milliGRAM(s) Oral daily  levothyroxine 88 MICROGram(s) Oral daily  metoprolol succinate ER 50 milliGRAM(s) Oral daily  pantoprazole    Tablet 40 milliGRAM(s) Oral before breakfast  ranolazine 500 milliGRAM(s) Oral daily  sodium chloride 0.9%. 1000 milliLiter(s) (60 mL/Hr) IV Continuous <Continuous>    MEDICATIONS  (PRN):  acetaminophen     Tablet .. 650 milliGRAM(s) Oral every 6 hours PRN Temp greater or equal to 38C (100.4F), Mild Pain (1 - 3)  aluminum hydroxide/magnesium hydroxide/simethicone Suspension 30 milliLiter(s) Oral every 4 hours PRN Dyspepsia  dextrose Oral Gel 15 Gram(s) Oral once PRN Blood Glucose LESS THAN 70 milliGRAM(s)/deciliter  melatonin 3 milliGRAM(s) Oral at bedtime PRN Insomnia  ondansetron Injectable 4 milliGRAM(s) IV Push every 8 hours PRN Nausea and/or Vomiting        CAPILLARY BLOOD GLUCOSE      POCT Blood Glucose.: 102 mg/dL (03 Aug 2023 09:18)  POCT Blood Glucose.: 139 mg/dL (02 Aug 2023 21:49)  POCT Blood Glucose.: 112 mg/dL (02 Aug 2023 13:51)    I&O's Summary      PHYSICAL EXAM:  Vital Signs Last 24 Hrs  T(C): 36.5 (03 Aug 2023 05:00), Max: 37.1 (02 Aug 2023 21:30)  T(F): 97.7 (03 Aug 2023 05:00), Max: 98.8 (02 Aug 2023 21:30)  HR: 91 (03 Aug 2023 05:00) (74 - 91)  BP: 146/71 (03 Aug 2023 05:00) (117/58 - 156/72)  BP(mean): --  RR: 18 (03 Aug 2023 05:00) (18 - 20)  SpO2: 95% (03 Aug 2023 05:00) (95% - 98%)    Parameters below as of 03 Aug 2023 05:00  Patient On (Oxygen Delivery Method): room air        GENERAL: NAD, well-developed  HEAD:  Atraumatic, Normocephalic  EYES: EOMI, PERRLA, conjunctiva and sclera clear  NECK: Supple, No JVD  CHEST/LUNG: Clear to auscultation bilaterally; No wheeze  HEART: Regular rate and rhythm; No murmurs, rubs, or gallops  ABDOMEN: Soft, Nontender, Nondistended; Bowel sounds present  EXTREMITIES:  2+ Peripheral Pulses, No clubbing, cyanosis, or edema  PSYCH: AAOx3  NEUROLOGY: non-focal  SKIN: No rashes or lesions    LABS:                        12.7   8.40  )-----------( 251      ( 03 Aug 2023 06:03 )             40.6     08-03    141  |  103  |  12  ----------------------------<  96  4.1   |  30  |  0.64    Ca    8.7      03 Aug 2023 06:03  Phos  3.7     08-03  Mg     2.10     08-03    TPro  6.7  /  Alb  4.0  /  TBili  0.4  /  DBili  x   /  AST  17  /  ALT  12  /  AlkPhos  75  08-02    PT/INR - ( 02 Aug 2023 15:24 )   PT: 11.3 sec;   INR: 1.01 ratio         PTT - ( 02 Aug 2023 15:24 )  PTT:31.2 sec      Urinalysis Basic - ( 03 Aug 2023 06:03 )    Color: x / Appearance: x / SG: x / pH: x  Gluc: 96 mg/dL / Ketone: x  / Bili: x / Urobili: x   Blood: x / Protein: x / Nitrite: x   Leuk Esterase: x / RBC: x / WBC x   Sq Epi: x / Non Sq Epi: x / Bacteria: x        RADIOLOGY & ADDITIONAL TESTS:    Imaging Personally Reviewed:    Consultant(s) Notes Reviewed:      Care Discussed with Consultants/Other Providers:  
Patient is a 88y old  Female who presents with a chief complaint of chest pain (03 Aug 2023 09:44)      HPI:  s/p angiogram, non obstructive coronaries noted.    PAST MEDICAL & SURGICAL HISTORY:  Cardiac arrest      HTN (hypertension)      COPD exacerbation      CAD (coronary artery disease)      Hypothyroid      No significant past surgical history          Review of Systems:   CONSTITUTIONAL: No fever, weight loss, or fatigue  EYES: No eye pain, visual disturbances, or discharge  ENMT:  No difficulty hearing, tinnitus, vertigo; No sinus or throat pain  NECK: No pain or stiffness  BREASTS: No pain, masses, or nipple discharge  RESPIRATORY: No cough, wheezing, chills or hemoptysis; No shortness of breath  CARDIOVASCULAR: No chest pain, palpitations, dizziness, or leg swelling  GASTROINTESTINAL: No abdominal or epigastric pain. No nausea, vomiting, or hematemesis; No diarrhea or constipation. No melena or hematochezia.  GENITOURINARY: No dysuria, frequency, hematuria, or incontinence  NEUROLOGICAL: No headaches, memory loss, loss of strength, numbness, or tremors  SKIN: No itching, burning, rashes, or lesions   LYMPH NODES: No enlarged glands  ENDOCRINE: No heat or cold intolerance; No hair loss  MUSCULOSKELETAL: No joint pain or swelling; No muscle, back, or extremity pain  PSYCHIATRIC: No depression, anxiety, mood swings, or difficulty sleeping  HEME/LYMPH: No easy bruising, or bleeding gums  ALLERY AND IMMUNOLOGIC: No hives or eczema    Allergies    No Known Allergies    Intolerances        Social History:     FAMILY HISTORY:  No pertinent family history in first degree relatives        MEDICATIONS  (STANDING):  amLODIPine   Tablet 2.5 milliGRAM(s) Oral daily  atorvastatin 20 milliGRAM(s) Oral at bedtime  budesonide  80 MICROgram(s)/formoterol 4.5 MICROgram(s) Inhaler 2 Puff(s) Inhalation two times a day  clopidogrel Tablet 75 milliGRAM(s) Oral daily  dextrose 5%. 1000 milliLiter(s) (50 mL/Hr) IV Continuous <Continuous>  dextrose 5%. 1000 milliLiter(s) (100 mL/Hr) IV Continuous <Continuous>  dextrose 50% Injectable 25 Gram(s) IV Push once  dextrose 50% Injectable 12.5 Gram(s) IV Push once  dextrose 50% Injectable 25 Gram(s) IV Push once  glucagon  Injectable 1 milliGRAM(s) IntraMuscular once  insulin lispro (ADMELOG) corrective regimen sliding scale   SubCutaneous three times a day before meals  insulin lispro (ADMELOG) corrective regimen sliding scale   SubCutaneous at bedtime  isosorbide   mononitrate ER Tablet (IMDUR) 30 milliGRAM(s) Oral daily  levothyroxine 88 MICROGram(s) Oral daily  metoprolol succinate ER 50 milliGRAM(s) Oral daily  pantoprazole    Tablet 40 milliGRAM(s) Oral before breakfast  ranolazine 500 milliGRAM(s) Oral daily  sodium chloride 0.9%. 1000 milliLiter(s) (60 mL/Hr) IV Continuous <Continuous>    MEDICATIONS  (PRN):  acetaminophen     Tablet .. 650 milliGRAM(s) Oral every 6 hours PRN Temp greater or equal to 38C (100.4F), Mild Pain (1 - 3)  aluminum hydroxide/magnesium hydroxide/simethicone Suspension 30 milliLiter(s) Oral every 4 hours PRN Dyspepsia  dextrose Oral Gel 15 Gram(s) Oral once PRN Blood Glucose LESS THAN 70 milliGRAM(s)/deciliter  melatonin 3 milliGRAM(s) Oral at bedtime PRN Insomnia  ondansetron Injectable 4 milliGRAM(s) IV Push every 8 hours PRN Nausea and/or Vomiting        CAPILLARY BLOOD GLUCOSE      POCT Blood Glucose.: 102 mg/dL (03 Aug 2023 09:18)  POCT Blood Glucose.: 139 mg/dL (02 Aug 2023 21:49)  POCT Blood Glucose.: 112 mg/dL (02 Aug 2023 13:51)    I&O's Summary      PHYSICAL EXAM:  Vital Signs Last 24 Hrs  T(C): 36.5 (03 Aug 2023 05:00), Max: 37.1 (02 Aug 2023 21:30)  T(F): 97.7 (03 Aug 2023 05:00), Max: 98.8 (02 Aug 2023 21:30)  HR: 91 (03 Aug 2023 05:00) (74 - 91)  BP: 146/71 (03 Aug 2023 05:00) (117/58 - 156/72)  BP(mean): --  RR: 18 (03 Aug 2023 05:00) (18 - 20)  SpO2: 95% (03 Aug 2023 05:00) (95% - 98%)    Parameters below as of 03 Aug 2023 05:00  Patient On (Oxygen Delivery Method): room air        GENERAL: NAD, well-developed  HEAD:  Atraumatic, Normocephalic  EYES: EOMI, PERRLA, conjunctiva and sclera clear  NECK: Supple, No JVD  CHEST/LUNG: Clear to auscultation bilaterally; No wheeze  HEART: Regular rate and rhythm; No murmurs, rubs, or gallops  ABDOMEN: Soft, Nontender, Nondistended; Bowel sounds present  EXTREMITIES:  2+ Peripheral Pulses, No clubbing, cyanosis, or edema  PSYCH: AAOx3  NEUROLOGY: non-focal  SKIN: No rashes or lesions    LABS:                        12.7   8.40  )-----------( 251      ( 03 Aug 2023 06:03 )             40.6     08-03    141  |  103  |  12  ----------------------------<  96  4.1   |  30  |  0.64    Ca    8.7      03 Aug 2023 06:03  Phos  3.7     08-03  Mg     2.10     08-03    TPro  6.7  /  Alb  4.0  /  TBili  0.4  /  DBili  x   /  AST  17  /  ALT  12  /  AlkPhos  75  08-02    PT/INR - ( 02 Aug 2023 15:24 )   PT: 11.3 sec;   INR: 1.01 ratio         PTT - ( 02 Aug 2023 15:24 )  PTT:31.2 sec      Urinalysis Basic - ( 03 Aug 2023 06:03 )    Color: x / Appearance: x / SG: x / pH: x  Gluc: 96 mg/dL / Ketone: x  / Bili: x / Urobili: x   Blood: x / Protein: x / Nitrite: x   Leuk Esterase: x / RBC: x / WBC x   Sq Epi: x / Non Sq Epi: x / Bacteria: x        RADIOLOGY & ADDITIONAL TESTS:    Imaging Personally Reviewed:    Consultant(s) Notes Reviewed:      Care Discussed with Consultants/Other Providers:

## 2023-08-04 NOTE — CHART NOTE - NSCHARTNOTEFT_GEN_A_CORE
Patient was started on Paxil. THis will take a few weeks to work. Patient may follow up with Dr. Ziegler's in-office psychiatrist. Will send home with Paxil Rx.

## 2023-08-04 NOTE — PROGRESS NOTE ADULT - ASSESSMENT
87 y/o F with PMH of CAD(s/p 3 stents), HLD, HTN, DM type II presented to [facility] ER with the chief complaint of chest pain, admitted for angiogram. 
89 y/o F with PMH of CAD(s/p 3 stents), HLD, HTN, DM type II presented to [facility] ER with the chief complaint of chest pain, admitted for angiogram.

## 2023-08-05 ENCOUNTER — TRANSCRIPTION ENCOUNTER (OUTPATIENT)
Age: 88
End: 2023-08-05

## 2023-08-07 ENCOUNTER — TRANSCRIPTION ENCOUNTER (OUTPATIENT)
Age: 88
End: 2023-08-07

## 2023-08-15 ENCOUNTER — TRANSCRIPTION ENCOUNTER (OUTPATIENT)
Age: 88
End: 2023-08-15

## 2023-08-30 ENCOUNTER — TRANSCRIPTION ENCOUNTER (OUTPATIENT)
Age: 88
End: 2023-08-30

## 2023-12-10 NOTE — ED ADULT NURSE NOTE - NSFALLRISKFACTORS_ED_ALL_ED
Plavix use/Coagulation: Bleeding disorder either through use of anticoagulants or underlying clinical condition(s)
none

## 2024-04-29 RX ORDER — ISOSORBIDE MONONITRATE 60 MG/1
1 TABLET, EXTENDED RELEASE ORAL
Refills: 0 | DISCHARGE

## 2024-04-29 RX ORDER — BUDESONIDE AND FORMOTEROL FUMARATE DIHYDRATE 160; 4.5 UG/1; UG/1
2 AEROSOL RESPIRATORY (INHALATION)
Refills: 0 | DISCHARGE

## 2024-04-29 RX ORDER — METFORMIN HYDROCHLORIDE 850 MG/1
1 TABLET ORAL
Refills: 0 | DISCHARGE

## 2024-04-29 RX ORDER — CLOPIDOGREL BISULFATE 75 MG/1
1 TABLET, FILM COATED ORAL
Refills: 0 | DISCHARGE

## 2024-04-29 RX ORDER — RANOLAZINE 500 MG/1
1 TABLET, FILM COATED, EXTENDED RELEASE ORAL
Refills: 0 | DISCHARGE

## 2024-04-30 PROBLEM — E78.5 HYPERLIPIDEMIA, UNSPECIFIED: Chronic | Status: ACTIVE | Noted: 2019-09-06

## 2024-07-29 NOTE — PATIENT PROFILE ADULT - HOW PATIENT ADDRESSED, PROFILE
----- Message from Akua Reyes PA-C sent at 7/29/2024  7:25 AM CDT -----  Please let patient know his US does not show any concerning findings.     Outgoing call, spoke to patient, message conveyed. No concerns    ms bernardo

## 2024-11-23 ENCOUNTER — APPOINTMENT (OUTPATIENT)
Dept: CARE COORDINATION | Facility: HOME HEALTH | Age: 89
End: 2024-11-23

## 2025-02-12 NOTE — H&P ADULT. - HISTORY OF PRESENT ILLNESS
Operative Note    Surgery Date: 2/12/2025    Laterality: Left knee    Preop Diagnosis:   Partial lateral meniscal root tear  Diffuse synovitis  Chondromalacia  High grade PCL tear    Postop Diagnosis:   1. Partial lateral meniscal root tear  2. Diffuse synovitis  3. Chondromalacia  4. High grade PCL tear  5. Medial plica     Procedure(s) :   Left Knee  1.  Diagnostic knee arthroscopy  2.  Diffuse synovectomy (medial, lateral, suprapatellar, infrapatellar)  3.  Arthroscopic chondroplasty  4.  Partial lateral meniscectomy  5.  Plica excision    Surgeon: Bhargavi Wong MD    Assistant(s): COOPER Méndez (used in the capacity of a surgical tech)    Anesthesia: The patient had administration of general anesthesia.     Estimated Blood Loss:  minimal    Implants:   * No implants in log *     Specimen: None.    Arthroscopic findings:   Patella: diffuse grade II-III chondromalacia  Trochlea: diffuse grade I chondromalacia  MFC: 8x16 mm area of grade III chondromalacia  MM: intact  MM Root: intact  RAMP: intact  MTP: intact  LFC: intact  LM: fraying along the posterior horn  LM Root: partial tearing and fraying of anterior portion but overall still well attached  LTP: diffuse grade I chondromalacia, central area of grade II  ACL: Intact  PCL: high grade partial tearing  Loose bodies: none     Indication: This is a 52 year old patient with persistent symptoms in the affected knee. Mechanical symptoms were noted. Reproducible joint line pain was noted on examination and intra-articular derangement was confirmed on MRI. Given initial findings we did treat non-operatively with PT, injections and bracing which ultimately failed. We discussed all the risks, benefits, and alternatives, operative versus further conservative interventions for treating the persistent knee pain, including debridement, repair, injections, further therapy and expectant management.  Ultimately, the patient would like to proceed with surgical management.   The risks, benefits, and alternatives to surgical treatment were discussed with the patient. Risks include, but are not limited to, pain, bleeding, infection, damage to surrounding structures (including blood vessels and nerves), failure of the procedure, need for secondary or revision procedures, failure of healing, incomplete functional recovery, worsening arthritis, spontaneous osteonecrosis, need for arthroplasty in the future and chronic pain. We discussed that 100% pain relief is often not achieved if arthritis is present. Additional risks pertinent to the surgery and anesthesia include pulmonary compromise, blood clots/pulmonary embolism, cardiac complications, and death. Additionally it was discussed that I do not believe his symptoms to be relating to the PCL injury and thus would not plan for reconstruction of that given his age, activity level, risks and substantial recovery required of that surgery. All of the patient's were addressed to their satisfaction and they understand and consent to the plan of care.      Procedure Description:    The patient was identified and the procedure verified in the pre-operative holding area. The site of surgery was properly marked.     Patient was taken to the operating room and placed supine on the operating room table. Anesthesia was induced. A time out was performed to confirm the correct patient, laterality, surgical site, procedure to be performed as well as timely administration of antibiotics.     Examination under anesthesia confirmed knee stability.  Lachman was normal and Pivot shift normal. No evidence of collateral ligament injury.  PCL demonstrated grade II drawer testing.     A non-sterile tourniquet was placed high on the affected extremity. The extremity was then prepped and draped in sterile fashion. The extremity was then elevated for exsanguination and the tourniquet was inflated.    We then proceeded with arthroscopy.  Standard medial and lateral  portals were created and diagnostic arthroscopy ensued.  Suprapatellar pouch demonstrated synovitis and slight hemorrhage on appearance.  Inspection of the medial and lateral facet of the patella and trochlea revealed the aforementioned cartilage condition.  Medial and lateral gutters demonstrated synovitis with no loose bodies.  We then directed our attention to the notch where ACL integrity was confirmed. There was high grade injury to the PCL but some fibers remained intact. Intra-operative drawer testing under direct visualization demonstrated about 6-7 mm of laxity with endpoint. Evaluation of the medial joint line demonstrated the aforementioned cartilage condition and intact meniscus. Examination of the lateral joint line demonstrated the aforementioned cartilage condition and partial anterior root tearing.     The majority of the lateral meniscal root was still attached and thus decision was made to proceed with debridement of the anterior portion. This was done with combination of shaver and biters. The meniscus was stable to probing upon completion.     At the conclusion, a repeat diagnostic arthroscopy was performed to confirm no residual loose bodies or pathology. Areas of residual unstable cartilage were debrided to a stable base.  The medial plica was excised with combination of shaver and RF. Synovectomy was then performed to address diffuse synovitis in the medial, lateral, prepatellar, and suprapatellar pouch. The joint was evacuated of fluid. Local anesthetic was placed at the portal sites.     We then closed the portal incisions with 3-0 nylon suture.  Of note, all counts were correct.     Complications: None noted intra-operatively    Post-Operative Condition: The patient was transferred to the PACU in apparent stable condition.     *HPI and  ROS was obtained from patient's daughter who was at bedside and wanted to translate for her mother*    81 y/o [place]ian speaking F with PMH of CAD(s/p 2 stents), GERD, DM, Asthma, Hypothyroidism presented with the complaint of palpitations and left sided chest pain. As per the daughter her mother has been having on and off left sided chest pain for the past 3 weeks. The chest pain is intermittent, located below the xiphoid process, lasting few minutes in duration, characterized as a pressure like sensation, aggravated when she would wake up in the morning and with deep inspiration, and with minimal exertion, without any alleviating factors, with radiation of the pain to the left chest below the left breast, and unable to give severity of the chest pain. This morning daughter noticed that her mother "felt warm and had chills" and wanted to bring her to the ED. Daughter also endorsed that her mother also complained of SOB and intermittent LE swelling. Patient also endorsed LUQ abdominal pain and intermittent episodes of nausea when she is eating, but this has been going on for past 1 month. Daughter denied any N/V/D/C, melena, hematochezia, recent travel, dysuria, sick contact, cough, or positional chest pain.      On ED admission EKG revealed NSR at a rate of 63 with left axis deviation, CE x 1: Negative, K: 5.4-->not hemolyzed, BNP: 92.73. CXR: Redemonstrated lobular masslike soft tissue shadow protruding from left hemidiaphragm corresponding to focal diaphragmatic herniation of fat as demonstrated on chest CT from 2/9/2015. Clear remaining visualized lungs. No pleural effusions or pneumothorax. Stable cardiac and mediastinal silhouettes including a left coronary stent. Trachea midline. Generalized osteopenia again noted. Patient was seen by cardiology who recommended Dr. Dotson: Echo and NST(Pharm), Cont with home meds, Check TSH. When examined patient is resting in the stretcher and denied any current CP, or SOB.

## 2025-04-07 ENCOUNTER — APPOINTMENT (OUTPATIENT)
Dept: CARE COORDINATION | Facility: HOME HEALTH | Age: 89
End: 2025-04-07

## 2025-04-07 VITALS — BODY MASS INDEX: 20.49 KG/M2 | HEIGHT: 64 IN | WEIGHT: 120 LBS

## 2025-04-07 DIAGNOSIS — E03.9 HYPOTHYROIDISM, UNSPECIFIED: ICD-10-CM

## 2025-04-07 DIAGNOSIS — E78.5 HYPERLIPIDEMIA, UNSPECIFIED: ICD-10-CM

## 2025-04-07 DIAGNOSIS — I10 ESSENTIAL (PRIMARY) HYPERTENSION: ICD-10-CM

## 2025-04-07 DIAGNOSIS — Z95.5 PRESENCE OF CORONARY ANGIOPLASTY IMPLANT AND GRAFT: ICD-10-CM

## 2025-04-07 DIAGNOSIS — E53.8 DEFICIENCY OF OTHER SPECIFIED B GROUP VITAMINS: ICD-10-CM

## 2025-04-07 DIAGNOSIS — K21.9 GASTRO-ESOPHAGEAL REFLUX DISEASE W/OUT ESOPHAGITIS: ICD-10-CM

## 2025-04-07 RX ORDER — METOPROLOL SUCCINATE 50 MG/1
50 TABLET, EXTENDED RELEASE ORAL DAILY
Qty: 30 | Refills: 0 | Status: ACTIVE | COMMUNITY
Start: 2025-04-07

## 2025-04-07 RX ORDER — NITROGLYCERIN 0.4 MG/1
0.4 TABLET SUBLINGUAL
Qty: 1 | Refills: 0 | Status: ACTIVE | COMMUNITY
Start: 2025-04-07

## 2025-04-07 RX ORDER — ALBUTEROL SULFATE 90 UG/1
108 (90 BASE) INHALANT RESPIRATORY (INHALATION)
Qty: 1 | Refills: 1 | Status: ACTIVE | COMMUNITY
Start: 2025-04-07

## 2025-04-07 RX ORDER — LOSARTAN POTASSIUM 25 MG/1
25 TABLET, FILM COATED ORAL DAILY
Qty: 1 | Refills: 0 | Status: ACTIVE | COMMUNITY
Start: 2025-04-07

## 2025-04-07 RX ORDER — CLOPIDOGREL 75 MG/1
75 TABLET, FILM COATED ORAL DAILY
Refills: 0 | Status: ACTIVE | COMMUNITY
Start: 2025-04-07

## 2025-04-07 RX ORDER — CYANOCOBALAMIN (VITAMIN B-12) 1000 MCG
1000 TABLET ORAL DAILY
Refills: 0 | Status: ACTIVE | COMMUNITY
Start: 2025-04-07